# Patient Record
Sex: MALE | Race: WHITE | NOT HISPANIC OR LATINO
[De-identification: names, ages, dates, MRNs, and addresses within clinical notes are randomized per-mention and may not be internally consistent; named-entity substitution may affect disease eponyms.]

---

## 2017-04-19 ENCOUNTER — APPOINTMENT (OUTPATIENT)
Dept: PULMONOLOGY | Facility: CLINIC | Age: 77
End: 2017-04-19

## 2017-04-19 VITALS
HEART RATE: 72 BPM | RESPIRATION RATE: 16 BRPM | BODY MASS INDEX: 22.49 KG/M2 | SYSTOLIC BLOOD PRESSURE: 130 MMHG | WEIGHT: 135 LBS | OXYGEN SATURATION: 99 % | HEIGHT: 65 IN | DIASTOLIC BLOOD PRESSURE: 60 MMHG

## 2017-05-11 ENCOUNTER — APPOINTMENT (OUTPATIENT)
Dept: CARDIOLOGY | Facility: CLINIC | Age: 77
End: 2017-05-11

## 2017-05-11 ENCOUNTER — NON-APPOINTMENT (OUTPATIENT)
Age: 77
End: 2017-05-11

## 2017-05-11 VITALS
DIASTOLIC BLOOD PRESSURE: 76 MMHG | BODY MASS INDEX: 23.32 KG/M2 | OXYGEN SATURATION: 97 % | SYSTOLIC BLOOD PRESSURE: 165 MMHG | WEIGHT: 140 LBS | HEART RATE: 72 BPM | HEIGHT: 65 IN

## 2017-05-23 ENCOUNTER — EMERGENCY (EMERGENCY)
Facility: HOSPITAL | Age: 77
LOS: 1 days | Discharge: HOME | End: 2017-05-23
Attending: EMERGENCY MEDICINE | Admitting: EMERGENCY MEDICINE
Payer: MEDICARE

## 2017-05-23 VITALS
TEMPERATURE: 98 F | SYSTOLIC BLOOD PRESSURE: 164 MMHG | HEART RATE: 80 BPM | OXYGEN SATURATION: 99 % | DIASTOLIC BLOOD PRESSURE: 78 MMHG | RESPIRATION RATE: 16 BRPM

## 2017-05-23 VITALS
DIASTOLIC BLOOD PRESSURE: 82 MMHG | HEART RATE: 81 BPM | OXYGEN SATURATION: 98 % | WEIGHT: 134.92 LBS | RESPIRATION RATE: 16 BRPM | SYSTOLIC BLOOD PRESSURE: 172 MMHG | TEMPERATURE: 98 F

## 2017-05-23 DIAGNOSIS — I25.10 ATHEROSCLEROTIC HEART DISEASE OF NATIVE CORONARY ARTERY WITHOUT ANGINA PECTORIS: ICD-10-CM

## 2017-05-23 DIAGNOSIS — S73.101A UNSPECIFIED SPRAIN OF RIGHT HIP, INITIAL ENCOUNTER: ICD-10-CM

## 2017-05-23 DIAGNOSIS — Z88.0 ALLERGY STATUS TO PENICILLIN: ICD-10-CM

## 2017-05-23 DIAGNOSIS — I10 ESSENTIAL (PRIMARY) HYPERTENSION: ICD-10-CM
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
## 2017-05-23 DIAGNOSIS — S51.811A LACERATION WITHOUT FOREIGN BODY OF RIGHT FOREARM, INITIAL ENCOUNTER: ICD-10-CM
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
## 2017-05-23 DIAGNOSIS — I25.2 OLD MYOCARDIAL INFARCTION: ICD-10-CM

## 2017-05-23 DIAGNOSIS — Z88.1 ALLERGY STATUS TO OTHER ANTIBIOTIC AGENTS STATUS: ICD-10-CM

## 2017-05-23 PROCEDURE — 99283 EMERGENCY DEPT VISIT LOW MDM: CPT

## 2017-05-23 PROCEDURE — 99283 EMERGENCY DEPT VISIT LOW MDM: CPT | Mod: 25

## 2017-05-23 PROCEDURE — 73502 X-RAY EXAM HIP UNI 2-3 VIEWS: CPT

## 2017-05-23 PROCEDURE — 73502 X-RAY EXAM HIP UNI 2-3 VIEWS: CPT | Mod: 26,RT

## 2017-05-23 NOTE — ED PROVIDER NOTE - CARE PLAN
Principal Discharge DX:	Skin tear of forearm without complication, right, initial encounter Principal Discharge DX:	Skin tear of forearm without complication, right, initial encounter  Secondary Diagnosis:	Hip strain, right, initial encounter

## 2017-05-23 NOTE — ED ADULT NURSE NOTE - OBJECTIVE STATEMENT
Pt presents to  subacute area, s/p fall , denies dizziness , weakness, c/o right forearm skin tear and right pelvic discomfort. Pt ambulated without difficulty.

## 2017-05-23 NOTE — ED PROVIDER NOTE - OBJECTIVE STATEMENT
trip and fall today onto deck at home, denies head injury, no loc, no anticoagulants, right forearm skin tear, refusing tetanus, states thinks he had a reaction many years ago.  PMd Dr Hernadez

## 2017-05-23 NOTE — ED PROVIDER NOTE - ATTENDING CONTRIBUTION TO CARE
I have personally performed a face to face diagnostic evaluation on this patient.  I have reviewed the PA note and agree with the history, exam, and plan of care.  76 y/o M presents s/p trip and fall today.  Sustained R forearm skin tear.  Also c/o mild R hip pain.  Did not hit his head.  Denies LOC.  Able to bear weight and ambulate.  No other complaints.

## 2017-05-23 NOTE — ED PROVIDER NOTE - MEDICAL DECISION MAKING DETAILS
right forearm skin tear, wound care, follow up with wound care center at [facility], xray pelvis, right hip strain

## 2017-05-23 NOTE — ED PROVIDER NOTE - PROGRESS NOTE DETAILS
patient refused rx antibiotics , advised follow up with wound care center at [facility]. patient refused rx antibiotics , refused pain medication, advised follow up with wound care center at [facility].

## 2017-05-26 ENCOUNTER — OUTPATIENT (OUTPATIENT)
Dept: OUTPATIENT SERVICES | Facility: HOSPITAL | Age: 77
LOS: 1 days | End: 2017-05-26
Payer: MEDICARE

## 2017-05-26 DIAGNOSIS — L97.801 NON-PRESSURE CHRONIC ULCER OF OTHER PART OF UNSPECIFIED LOWER LEG LIMITED TO BREAKDOWN OF SKIN: ICD-10-CM

## 2017-05-26 PROCEDURE — G0463: CPT
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
## 2017-05-27 DIAGNOSIS — S51.811D LACERATION WITHOUT FOREIGN BODY OF RIGHT FOREARM, SUBSEQUENT ENCOUNTER: ICD-10-CM

## 2017-05-27 DIAGNOSIS — Z85.46 PERSONAL HISTORY OF MALIGNANT NEOPLASM OF PROSTATE: ICD-10-CM

## 2017-05-27 DIAGNOSIS — E78.5 HYPERLIPIDEMIA, UNSPECIFIED: ICD-10-CM

## 2017-05-27 DIAGNOSIS — M06.9 RHEUMATOID ARTHRITIS, UNSPECIFIED: ICD-10-CM
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
## 2017-05-27 DIAGNOSIS — I25.10 ATHEROSCLEROTIC HEART DISEASE OF NATIVE CORONARY ARTERY WITHOUT ANGINA PECTORIS: ICD-10-CM
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
## 2017-05-27 DIAGNOSIS — Z98.890 OTHER SPECIFIED POSTPROCEDURAL STATES: ICD-10-CM

## 2017-05-27 DIAGNOSIS — Z79.899 OTHER LONG TERM (CURRENT) DRUG THERAPY: ICD-10-CM

## 2017-05-27 DIAGNOSIS — Z88.1 ALLERGY STATUS TO OTHER ANTIBIOTIC AGENTS STATUS: ICD-10-CM

## 2017-05-27 DIAGNOSIS — L40.59 OTHER PSORIATIC ARTHROPATHY: ICD-10-CM

## 2017-05-27 DIAGNOSIS — Z88.0 ALLERGY STATUS TO PENICILLIN: ICD-10-CM

## 2017-05-27 DIAGNOSIS — R33.9 RETENTION OF URINE, UNSPECIFIED: ICD-10-CM

## 2017-05-27 DIAGNOSIS — Z96.643 PRESENCE OF ARTIFICIAL HIP JOINT, BILATERAL: ICD-10-CM

## 2017-05-27 DIAGNOSIS — I10 ESSENTIAL (PRIMARY) HYPERTENSION: ICD-10-CM

## 2017-05-27 DIAGNOSIS — Z88.8 ALLERGY STATUS TO OTHER DRUGS, MEDICAMENTS AND BIOLOGICAL SUBSTANCES STATUS: ICD-10-CM
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
## 2017-05-27 DIAGNOSIS — Z82.0 FAMILY HISTORY OF EPILEPSY AND OTHER DISEASES OF THE NERVOUS SYSTEM: ICD-10-CM

## 2017-05-27 DIAGNOSIS — H91.90 UNSPECIFIED HEARING LOSS, UNSPECIFIED EAR: ICD-10-CM

## 2017-05-27 DIAGNOSIS — Z80.1 FAMILY HISTORY OF MALIGNANT NEOPLASM OF TRACHEA, BRONCHUS AND LUNG: ICD-10-CM

## 2017-05-27 DIAGNOSIS — I25.2 OLD MYOCARDIAL INFARCTION: ICD-10-CM

## 2017-06-05 ENCOUNTER — OUTPATIENT (OUTPATIENT)
Dept: OUTPATIENT SERVICES | Facility: HOSPITAL | Age: 77
LOS: 1 days | End: 2017-06-05
Payer: MEDICARE

## 2017-06-05 DIAGNOSIS — L97.801 NON-PRESSURE CHRONIC ULCER OF OTHER PART OF UNSPECIFIED LOWER LEG LIMITED TO BREAKDOWN OF SKIN: ICD-10-CM

## 2017-06-05 PROCEDURE — G0463: CPT

## 2017-06-08 DIAGNOSIS — L40.59 OTHER PSORIATIC ARTHROPATHY: ICD-10-CM

## 2017-06-08 DIAGNOSIS — Z82.0 FAMILY HISTORY OF EPILEPSY AND OTHER DISEASES OF THE NERVOUS SYSTEM: ICD-10-CM
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
## 2017-06-08 DIAGNOSIS — Z88.0 ALLERGY STATUS TO PENICILLIN: ICD-10-CM

## 2017-06-08 DIAGNOSIS — Z88.1 ALLERGY STATUS TO OTHER ANTIBIOTIC AGENTS STATUS: ICD-10-CM

## 2017-06-08 DIAGNOSIS — Z98.890 OTHER SPECIFIED POSTPROCEDURAL STATES: ICD-10-CM

## 2017-06-08 DIAGNOSIS — I25.10 ATHEROSCLEROTIC HEART DISEASE OF NATIVE CORONARY ARTERY WITHOUT ANGINA PECTORIS: ICD-10-CM
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
## 2017-06-08 DIAGNOSIS — E78.5 HYPERLIPIDEMIA, UNSPECIFIED: ICD-10-CM
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
## 2017-06-08 DIAGNOSIS — S51.811D LACERATION WITHOUT FOREIGN BODY OF RIGHT FOREARM, SUBSEQUENT ENCOUNTER: ICD-10-CM

## 2017-06-08 DIAGNOSIS — R33.9 RETENTION OF URINE, UNSPECIFIED: ICD-10-CM

## 2017-06-08 DIAGNOSIS — Z79.899 OTHER LONG TERM (CURRENT) DRUG THERAPY: ICD-10-CM

## 2017-06-08 DIAGNOSIS — M06.9 RHEUMATOID ARTHRITIS, UNSPECIFIED: ICD-10-CM

## 2017-06-08 DIAGNOSIS — Z80.1 FAMILY HISTORY OF MALIGNANT NEOPLASM OF TRACHEA, BRONCHUS AND LUNG: ICD-10-CM

## 2017-06-08 DIAGNOSIS — Z96.643 PRESENCE OF ARTIFICIAL HIP JOINT, BILATERAL: ICD-10-CM

## 2017-06-08 DIAGNOSIS — Z85.46 PERSONAL HISTORY OF MALIGNANT NEOPLASM OF PROSTATE: ICD-10-CM

## 2017-06-08 DIAGNOSIS — I10 ESSENTIAL (PRIMARY) HYPERTENSION: ICD-10-CM
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
## 2017-06-08 DIAGNOSIS — I25.2 OLD MYOCARDIAL INFARCTION: ICD-10-CM

## 2017-06-08 DIAGNOSIS — Z88.8 ALLERGY STATUS TO OTHER DRUGS, MEDICAMENTS AND BIOLOGICAL SUBSTANCES STATUS: ICD-10-CM

## 2017-06-08 DIAGNOSIS — H91.90 UNSPECIFIED HEARING LOSS, UNSPECIFIED EAR: ICD-10-CM

## 2017-06-12 ENCOUNTER — OUTPATIENT (OUTPATIENT)
Dept: OUTPATIENT SERVICES | Facility: HOSPITAL | Age: 77
LOS: 1 days | End: 2017-06-12
Payer: MEDICARE

## 2017-06-12 DIAGNOSIS — L97.801 NON-PRESSURE CHRONIC ULCER OF OTHER PART OF UNSPECIFIED LOWER LEG LIMITED TO BREAKDOWN OF SKIN: ICD-10-CM

## 2017-06-12 PROCEDURE — G0463: CPT

## 2017-06-12 PROCEDURE — 99213 OFFICE O/P EST LOW 20 MIN: CPT

## 2017-06-14 DIAGNOSIS — I10 ESSENTIAL (PRIMARY) HYPERTENSION: ICD-10-CM

## 2017-06-14 DIAGNOSIS — S51.811D LACERATION WITHOUT FOREIGN BODY OF RIGHT FOREARM, SUBSEQUENT ENCOUNTER: ICD-10-CM

## 2017-06-14 DIAGNOSIS — Z82.0 FAMILY HISTORY OF EPILEPSY AND OTHER DISEASES OF THE NERVOUS SYSTEM: ICD-10-CM

## 2017-06-14 DIAGNOSIS — Z88.0 ALLERGY STATUS TO PENICILLIN: ICD-10-CM

## 2017-06-14 DIAGNOSIS — I25.10 ATHEROSCLEROTIC HEART DISEASE OF NATIVE CORONARY ARTERY WITHOUT ANGINA PECTORIS: ICD-10-CM
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
## 2017-06-14 DIAGNOSIS — Z88.8 ALLERGY STATUS TO OTHER DRUGS, MEDICAMENTS AND BIOLOGICAL SUBSTANCES STATUS: ICD-10-CM

## 2017-06-14 DIAGNOSIS — Z80.1 FAMILY HISTORY OF MALIGNANT NEOPLASM OF TRACHEA, BRONCHUS AND LUNG: ICD-10-CM

## 2017-06-14 DIAGNOSIS — I25.2 OLD MYOCARDIAL INFARCTION: ICD-10-CM

## 2017-06-14 DIAGNOSIS — E78.5 HYPERLIPIDEMIA, UNSPECIFIED: ICD-10-CM

## 2017-06-14 DIAGNOSIS — Z88.1 ALLERGY STATUS TO OTHER ANTIBIOTIC AGENTS STATUS: ICD-10-CM

## 2017-06-14 DIAGNOSIS — M06.9 RHEUMATOID ARTHRITIS, UNSPECIFIED: ICD-10-CM
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
## 2017-06-14 DIAGNOSIS — Z96.643 PRESENCE OF ARTIFICIAL HIP JOINT, BILATERAL: ICD-10-CM

## 2017-06-14 DIAGNOSIS — Z85.46 PERSONAL HISTORY OF MALIGNANT NEOPLASM OF PROSTATE: ICD-10-CM
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
## 2017-06-14 DIAGNOSIS — Z79.899 OTHER LONG TERM (CURRENT) DRUG THERAPY: ICD-10-CM

## 2017-06-14 DIAGNOSIS — H91.90 UNSPECIFIED HEARING LOSS, UNSPECIFIED EAR: ICD-10-CM

## 2017-06-14 DIAGNOSIS — R33.9 RETENTION OF URINE, UNSPECIFIED: ICD-10-CM

## 2017-06-14 DIAGNOSIS — Z98.890 OTHER SPECIFIED POSTPROCEDURAL STATES: ICD-10-CM

## 2017-06-14 DIAGNOSIS — L40.59 OTHER PSORIATIC ARTHROPATHY: ICD-10-CM

## 2017-06-19 ENCOUNTER — OUTPATIENT (OUTPATIENT)
Dept: OUTPATIENT SERVICES | Facility: HOSPITAL | Age: 77
LOS: 1 days | End: 2017-06-19
Payer: MEDICARE

## 2017-06-19 DIAGNOSIS — S51.811D LACERATION WITHOUT FOREIGN BODY OF RIGHT FOREARM, SUBSEQUENT ENCOUNTER: ICD-10-CM

## 2017-06-19 PROCEDURE — G0463: CPT

## 2017-06-21 DIAGNOSIS — Z98.890 OTHER SPECIFIED POSTPROCEDURAL STATES: ICD-10-CM
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
## 2017-06-21 DIAGNOSIS — R33.9 RETENTION OF URINE, UNSPECIFIED: ICD-10-CM

## 2017-06-21 DIAGNOSIS — Z88.1 ALLERGY STATUS TO OTHER ANTIBIOTIC AGENTS STATUS: ICD-10-CM
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
## 2017-06-21 DIAGNOSIS — S50.811D ABRASION OF RIGHT FOREARM, SUBSEQUENT ENCOUNTER: ICD-10-CM

## 2017-06-21 DIAGNOSIS — Z79.899 OTHER LONG TERM (CURRENT) DRUG THERAPY: ICD-10-CM

## 2017-06-21 DIAGNOSIS — Z85.46 PERSONAL HISTORY OF MALIGNANT NEOPLASM OF PROSTATE: ICD-10-CM

## 2017-06-21 DIAGNOSIS — H91.90 UNSPECIFIED HEARING LOSS, UNSPECIFIED EAR: ICD-10-CM

## 2017-06-21 DIAGNOSIS — I25.2 OLD MYOCARDIAL INFARCTION: ICD-10-CM

## 2017-06-21 DIAGNOSIS — Z80.1 FAMILY HISTORY OF MALIGNANT NEOPLASM OF TRACHEA, BRONCHUS AND LUNG: ICD-10-CM

## 2017-06-21 DIAGNOSIS — Z96.643 PRESENCE OF ARTIFICIAL HIP JOINT, BILATERAL: ICD-10-CM

## 2017-06-21 DIAGNOSIS — I10 ESSENTIAL (PRIMARY) HYPERTENSION: ICD-10-CM

## 2017-06-21 DIAGNOSIS — Z82.0 FAMILY HISTORY OF EPILEPSY AND OTHER DISEASES OF THE NERVOUS SYSTEM: ICD-10-CM
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
## 2017-06-21 DIAGNOSIS — M06.9 RHEUMATOID ARTHRITIS, UNSPECIFIED: ICD-10-CM

## 2017-06-21 DIAGNOSIS — E78.5 HYPERLIPIDEMIA, UNSPECIFIED: ICD-10-CM

## 2017-06-21 DIAGNOSIS — I25.10 ATHEROSCLEROTIC HEART DISEASE OF NATIVE CORONARY ARTERY WITHOUT ANGINA PECTORIS: ICD-10-CM
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
## 2017-06-21 DIAGNOSIS — Z88.8 ALLERGY STATUS TO OTHER DRUGS, MEDICAMENTS AND BIOLOGICAL SUBSTANCES STATUS: ICD-10-CM

## 2017-06-21 DIAGNOSIS — Z88.0 ALLERGY STATUS TO PENICILLIN: ICD-10-CM

## 2017-06-21 DIAGNOSIS — L40.59 OTHER PSORIATIC ARTHROPATHY: ICD-10-CM

## 2017-06-26 ENCOUNTER — OUTPATIENT (OUTPATIENT)
Dept: OUTPATIENT SERVICES | Facility: HOSPITAL | Age: 77
LOS: 1 days | End: 2017-06-26
Payer: MEDICARE

## 2017-06-26 DIAGNOSIS — L97.801 NON-PRESSURE CHRONIC ULCER OF OTHER PART OF UNSPECIFIED LOWER LEG LIMITED TO BREAKDOWN OF SKIN: ICD-10-CM

## 2017-06-26 PROCEDURE — 99213 OFFICE O/P EST LOW 20 MIN: CPT

## 2017-06-26 PROCEDURE — G0463: CPT

## 2017-06-29 DIAGNOSIS — R33.9 RETENTION OF URINE, UNSPECIFIED: ICD-10-CM

## 2017-06-29 DIAGNOSIS — I10 ESSENTIAL (PRIMARY) HYPERTENSION: ICD-10-CM

## 2017-06-29 DIAGNOSIS — M06.9 RHEUMATOID ARTHRITIS, UNSPECIFIED: ICD-10-CM
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
## 2017-06-29 DIAGNOSIS — S50.811D ABRASION OF RIGHT FOREARM, SUBSEQUENT ENCOUNTER: ICD-10-CM

## 2017-06-29 DIAGNOSIS — Z79.899 OTHER LONG TERM (CURRENT) DRUG THERAPY: ICD-10-CM

## 2017-06-29 DIAGNOSIS — Z88.1 ALLERGY STATUS TO OTHER ANTIBIOTIC AGENTS STATUS: ICD-10-CM

## 2017-06-29 DIAGNOSIS — Z82.0 FAMILY HISTORY OF EPILEPSY AND OTHER DISEASES OF THE NERVOUS SYSTEM: ICD-10-CM
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
## 2017-06-29 DIAGNOSIS — Z96.643 PRESENCE OF ARTIFICIAL HIP JOINT, BILATERAL: ICD-10-CM

## 2017-06-29 DIAGNOSIS — I25.2 OLD MYOCARDIAL INFARCTION: ICD-10-CM
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
## 2017-06-29 DIAGNOSIS — H91.90 UNSPECIFIED HEARING LOSS, UNSPECIFIED EAR: ICD-10-CM

## 2017-06-29 DIAGNOSIS — Z85.46 PERSONAL HISTORY OF MALIGNANT NEOPLASM OF PROSTATE: ICD-10-CM

## 2017-06-29 DIAGNOSIS — I25.10 ATHEROSCLEROTIC HEART DISEASE OF NATIVE CORONARY ARTERY WITHOUT ANGINA PECTORIS: ICD-10-CM

## 2017-06-29 DIAGNOSIS — E78.5 HYPERLIPIDEMIA, UNSPECIFIED: ICD-10-CM

## 2017-06-29 DIAGNOSIS — Z80.1 FAMILY HISTORY OF MALIGNANT NEOPLASM OF TRACHEA, BRONCHUS AND LUNG: ICD-10-CM

## 2017-06-29 DIAGNOSIS — Z98.890 OTHER SPECIFIED POSTPROCEDURAL STATES: ICD-10-CM

## 2017-06-29 DIAGNOSIS — L40.59 OTHER PSORIATIC ARTHROPATHY: ICD-10-CM

## 2017-06-29 DIAGNOSIS — Z88.0 ALLERGY STATUS TO PENICILLIN: ICD-10-CM
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
## 2017-06-29 DIAGNOSIS — Z88.8 ALLERGY STATUS TO OTHER DRUGS, MEDICAMENTS AND BIOLOGICAL SUBSTANCES STATUS: ICD-10-CM

## 2017-07-03 ENCOUNTER — OUTPATIENT (OUTPATIENT)
Dept: OUTPATIENT SERVICES | Facility: HOSPITAL | Age: 77
LOS: 1 days | End: 2017-07-03
Payer: MEDICARE

## 2017-07-03 DIAGNOSIS — L97.801 NON-PRESSURE CHRONIC ULCER OF OTHER PART OF UNSPECIFIED LOWER LEG LIMITED TO BREAKDOWN OF SKIN: ICD-10-CM

## 2017-07-03 PROCEDURE — G0463: CPT

## 2017-07-06 DIAGNOSIS — E78.5 HYPERLIPIDEMIA, UNSPECIFIED: ICD-10-CM

## 2017-07-06 DIAGNOSIS — Z98.890 OTHER SPECIFIED POSTPROCEDURAL STATES: ICD-10-CM

## 2017-07-06 DIAGNOSIS — M06.9 RHEUMATOID ARTHRITIS, UNSPECIFIED: ICD-10-CM

## 2017-07-06 DIAGNOSIS — Z88.1 ALLERGY STATUS TO OTHER ANTIBIOTIC AGENTS STATUS: ICD-10-CM

## 2017-07-06 DIAGNOSIS — Z82.0 FAMILY HISTORY OF EPILEPSY AND OTHER DISEASES OF THE NERVOUS SYSTEM: ICD-10-CM

## 2017-07-06 DIAGNOSIS — S50.811D ABRASION OF RIGHT FOREARM, SUBSEQUENT ENCOUNTER: ICD-10-CM

## 2017-07-06 DIAGNOSIS — I25.10 ATHEROSCLEROTIC HEART DISEASE OF NATIVE CORONARY ARTERY WITHOUT ANGINA PECTORIS: ICD-10-CM
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
## 2017-07-06 DIAGNOSIS — Z96.643 PRESENCE OF ARTIFICIAL HIP JOINT, BILATERAL: ICD-10-CM

## 2017-07-06 DIAGNOSIS — I10 ESSENTIAL (PRIMARY) HYPERTENSION: ICD-10-CM
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
## 2017-07-06 DIAGNOSIS — I25.2 OLD MYOCARDIAL INFARCTION: ICD-10-CM

## 2017-07-06 DIAGNOSIS — Z79.899 OTHER LONG TERM (CURRENT) DRUG THERAPY: ICD-10-CM

## 2017-07-06 DIAGNOSIS — Z80.1 FAMILY HISTORY OF MALIGNANT NEOPLASM OF TRACHEA, BRONCHUS AND LUNG: ICD-10-CM

## 2017-07-06 DIAGNOSIS — Z88.0 ALLERGY STATUS TO PENICILLIN: ICD-10-CM

## 2017-07-06 DIAGNOSIS — Z88.8 ALLERGY STATUS TO OTHER DRUGS, MEDICAMENTS AND BIOLOGICAL SUBSTANCES STATUS: ICD-10-CM

## 2017-07-06 DIAGNOSIS — R33.9 RETENTION OF URINE, UNSPECIFIED: ICD-10-CM
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
## 2017-07-06 DIAGNOSIS — H91.90 UNSPECIFIED HEARING LOSS, UNSPECIFIED EAR: ICD-10-CM
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
## 2017-07-06 DIAGNOSIS — Z85.46 PERSONAL HISTORY OF MALIGNANT NEOPLASM OF PROSTATE: ICD-10-CM

## 2017-07-06 DIAGNOSIS — L40.59 OTHER PSORIATIC ARTHROPATHY: ICD-10-CM

## 2017-07-10 ENCOUNTER — OUTPATIENT (OUTPATIENT)
Dept: OUTPATIENT SERVICES | Facility: HOSPITAL | Age: 77
LOS: 1 days | End: 2017-07-10
Payer: MEDICARE

## 2017-07-10 DIAGNOSIS — S51.811D LACERATION WITHOUT FOREIGN BODY OF RIGHT FOREARM, SUBSEQUENT ENCOUNTER: ICD-10-CM

## 2017-07-10 PROCEDURE — G0463: CPT

## 2017-07-12 DIAGNOSIS — E78.5 HYPERLIPIDEMIA, UNSPECIFIED: ICD-10-CM

## 2017-07-12 DIAGNOSIS — H91.90 UNSPECIFIED HEARING LOSS, UNSPECIFIED EAR: ICD-10-CM

## 2017-07-12 DIAGNOSIS — S50.811D ABRASION OF RIGHT FOREARM, SUBSEQUENT ENCOUNTER: ICD-10-CM

## 2017-07-12 DIAGNOSIS — Z88.1 ALLERGY STATUS TO OTHER ANTIBIOTIC AGENTS STATUS: ICD-10-CM

## 2017-07-12 DIAGNOSIS — Z85.49 PERSONAL HISTORY OF MALIGNANT NEOPLASM OF OTHER MALE GENITAL ORGANS: ICD-10-CM

## 2017-07-12 DIAGNOSIS — Z88.0 ALLERGY STATUS TO PENICILLIN: ICD-10-CM

## 2017-07-12 DIAGNOSIS — L40.59 OTHER PSORIATIC ARTHROPATHY: ICD-10-CM

## 2017-07-12 DIAGNOSIS — Z88.8 ALLERGY STATUS TO OTHER DRUGS, MEDICAMENTS AND BIOLOGICAL SUBSTANCES STATUS: ICD-10-CM

## 2017-07-12 DIAGNOSIS — Z96.643 PRESENCE OF ARTIFICIAL HIP JOINT, BILATERAL: ICD-10-CM

## 2017-07-12 DIAGNOSIS — I25.10 ATHEROSCLEROTIC HEART DISEASE OF NATIVE CORONARY ARTERY WITHOUT ANGINA PECTORIS: ICD-10-CM

## 2017-07-12 DIAGNOSIS — I25.2 OLD MYOCARDIAL INFARCTION: ICD-10-CM
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
## 2017-07-12 DIAGNOSIS — Z79.899 OTHER LONG TERM (CURRENT) DRUG THERAPY: ICD-10-CM

## 2017-07-12 DIAGNOSIS — Z80.1 FAMILY HISTORY OF MALIGNANT NEOPLASM OF TRACHEA, BRONCHUS AND LUNG: ICD-10-CM

## 2017-07-12 DIAGNOSIS — Z98.890 OTHER SPECIFIED POSTPROCEDURAL STATES: ICD-10-CM
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
## 2017-07-12 DIAGNOSIS — R33.9 RETENTION OF URINE, UNSPECIFIED: ICD-10-CM

## 2017-07-12 DIAGNOSIS — I10 ESSENTIAL (PRIMARY) HYPERTENSION: ICD-10-CM

## 2017-07-12 DIAGNOSIS — Z82.0 FAMILY HISTORY OF EPILEPSY AND OTHER DISEASES OF THE NERVOUS SYSTEM: ICD-10-CM
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
## 2017-07-12 DIAGNOSIS — M06.9 RHEUMATOID ARTHRITIS, UNSPECIFIED: ICD-10-CM

## 2017-07-18 ENCOUNTER — RX RENEWAL (OUTPATIENT)
Age: 77
End: 2017-07-18

## 2017-10-05 ENCOUNTER — TRANSCRIPTION ENCOUNTER (OUTPATIENT)
Age: 77
End: 2017-10-05

## 2017-10-25 ENCOUNTER — RX RENEWAL (OUTPATIENT)
Age: 77
End: 2017-10-25

## 2017-11-15 ENCOUNTER — APPOINTMENT (OUTPATIENT)
Dept: PULMONOLOGY | Facility: CLINIC | Age: 77
End: 2017-11-15
Payer: MEDICARE

## 2017-11-15 VITALS
HEART RATE: 72 BPM | OXYGEN SATURATION: 98 % | DIASTOLIC BLOOD PRESSURE: 80 MMHG | BODY MASS INDEX: 21.66 KG/M2 | HEIGHT: 65 IN | SYSTOLIC BLOOD PRESSURE: 126 MMHG | WEIGHT: 130 LBS

## 2017-11-15 PROCEDURE — 99214 OFFICE O/P EST MOD 30 MIN: CPT | Mod: 25

## 2017-11-15 PROCEDURE — 94010 BREATHING CAPACITY TEST: CPT

## 2017-11-17 ENCOUNTER — APPOINTMENT (OUTPATIENT)
Dept: CARDIOLOGY | Facility: CLINIC | Age: 77
End: 2017-11-17
Payer: MEDICARE

## 2017-11-17 ENCOUNTER — NON-APPOINTMENT (OUTPATIENT)
Age: 77
End: 2017-11-17

## 2017-11-17 VITALS
DIASTOLIC BLOOD PRESSURE: 77 MMHG | BODY MASS INDEX: 21.66 KG/M2 | SYSTOLIC BLOOD PRESSURE: 159 MMHG | HEART RATE: 70 BPM | WEIGHT: 130 LBS | OXYGEN SATURATION: 97 % | HEIGHT: 65 IN

## 2017-11-17 PROCEDURE — 93000 ELECTROCARDIOGRAM COMPLETE: CPT

## 2017-11-17 PROCEDURE — 99214 OFFICE O/P EST MOD 30 MIN: CPT

## 2018-02-14 ENCOUNTER — RX RENEWAL (OUTPATIENT)
Age: 78
End: 2018-02-14

## 2018-04-03 ENCOUNTER — MEDICATION RENEWAL (OUTPATIENT)
Age: 78
End: 2018-04-03

## 2018-05-15 ENCOUNTER — NON-APPOINTMENT (OUTPATIENT)
Age: 78
End: 2018-05-15

## 2018-05-15 ENCOUNTER — APPOINTMENT (OUTPATIENT)
Dept: PULMONOLOGY | Facility: CLINIC | Age: 78
End: 2018-05-15
Payer: MEDICARE

## 2018-05-15 VITALS
HEART RATE: 76 BPM | OXYGEN SATURATION: 98 % | SYSTOLIC BLOOD PRESSURE: 132 MMHG | WEIGHT: 133 LBS | DIASTOLIC BLOOD PRESSURE: 70 MMHG | HEIGHT: 64 IN | BODY MASS INDEX: 22.71 KG/M2 | RESPIRATION RATE: 17 BRPM

## 2018-05-15 PROCEDURE — 94010 BREATHING CAPACITY TEST: CPT

## 2018-05-15 PROCEDURE — 99214 OFFICE O/P EST MOD 30 MIN: CPT | Mod: 25

## 2018-05-30 ENCOUNTER — APPOINTMENT (OUTPATIENT)
Dept: CARDIOLOGY | Facility: CLINIC | Age: 78
End: 2018-05-30
Payer: MEDICARE

## 2018-05-30 ENCOUNTER — NON-APPOINTMENT (OUTPATIENT)
Age: 78
End: 2018-05-30

## 2018-05-30 VITALS
BODY MASS INDEX: 22.53 KG/M2 | SYSTOLIC BLOOD PRESSURE: 159 MMHG | DIASTOLIC BLOOD PRESSURE: 73 MMHG | HEART RATE: 69 BPM | WEIGHT: 132 LBS | HEIGHT: 64 IN | OXYGEN SATURATION: 97 %

## 2018-05-30 PROCEDURE — 99214 OFFICE O/P EST MOD 30 MIN: CPT

## 2018-05-30 PROCEDURE — 93000 ELECTROCARDIOGRAM COMPLETE: CPT

## 2018-05-30 RX ORDER — CLOTRIMAZOLE AND BETAMETHASONE DIPROPIONATE 10; .5 MG/ML; MG/ML
1-0.05 LOTION TOPICAL
Qty: 60 | Refills: 0 | Status: DISCONTINUED | COMMUNITY
Start: 2017-10-31 | End: 2018-05-30

## 2018-08-07 ENCOUNTER — RX RENEWAL (OUTPATIENT)
Age: 78
End: 2018-08-07

## 2018-10-16 ENCOUNTER — MEDICATION RENEWAL (OUTPATIENT)
Age: 78
End: 2018-10-16

## 2018-10-17 ENCOUNTER — TRANSCRIPTION ENCOUNTER (OUTPATIENT)
Age: 78
End: 2018-10-17

## 2018-11-09 PROBLEM — I21.3 ST ELEVATION (STEMI) MYOCARDIAL INFARCTION OF UNSPECIFIED SITE: Chronic | Status: ACTIVE | Noted: 2017-05-23

## 2018-11-09 PROBLEM — E78.5 HYPERLIPIDEMIA, UNSPECIFIED: Chronic | Status: ACTIVE | Noted: 2017-05-23

## 2018-11-09 PROBLEM — I25.10 ATHEROSCLEROTIC HEART DISEASE OF NATIVE CORONARY ARTERY WITHOUT ANGINA PECTORIS: Chronic | Status: ACTIVE | Noted: 2017-05-23

## 2018-11-09 PROBLEM — I10 ESSENTIAL (PRIMARY) HYPERTENSION: Chronic | Status: ACTIVE | Noted: 2017-05-23

## 2018-11-12 ENCOUNTER — OTHER (OUTPATIENT)
Age: 78
End: 2018-11-12

## 2018-11-15 ENCOUNTER — NON-APPOINTMENT (OUTPATIENT)
Age: 78
End: 2018-11-15

## 2018-11-15 ENCOUNTER — APPOINTMENT (OUTPATIENT)
Dept: PULMONOLOGY | Facility: CLINIC | Age: 78
End: 2018-11-15
Payer: MEDICARE

## 2018-11-15 VITALS
WEIGHT: 124 LBS | RESPIRATION RATE: 17 BRPM | HEIGHT: 64 IN | OXYGEN SATURATION: 98 % | BODY MASS INDEX: 21.17 KG/M2 | SYSTOLIC BLOOD PRESSURE: 110 MMHG | DIASTOLIC BLOOD PRESSURE: 70 MMHG | HEART RATE: 76 BPM

## 2018-11-15 DIAGNOSIS — R26.89 OTHER ABNORMALITIES OF GAIT AND MOBILITY: ICD-10-CM

## 2018-11-15 PROCEDURE — 99214 OFFICE O/P EST MOD 30 MIN: CPT | Mod: 25

## 2018-11-15 PROCEDURE — 94010 BREATHING CAPACITY TEST: CPT

## 2018-11-15 RX ORDER — MONTELUKAST 10 MG/1
10 TABLET, FILM COATED ORAL
Qty: 90 | Refills: 0 | Status: DISCONTINUED | COMMUNITY
Start: 2017-11-15 | End: 2018-11-15

## 2018-11-15 RX ORDER — MONTELUKAST SODIUM 10 MG/1
10 TABLET, FILM COATED ORAL
Qty: 1 | Refills: 1 | Status: DISCONTINUED | COMMUNITY
Start: 2017-07-18 | End: 2018-11-15

## 2018-11-15 RX ORDER — LEVOCETIRIZINE DIHYDROCHLORIDE 5 MG/1
5 TABLET ORAL
Qty: 1 | Refills: 1 | Status: DISCONTINUED | COMMUNITY
Start: 2017-11-15 | End: 2018-11-15

## 2018-11-15 RX ORDER — MONTELUKAST 10 MG/1
10 TABLET, FILM COATED ORAL
Qty: 90 | Refills: 0 | Status: DISCONTINUED | COMMUNITY
Start: 2017-04-19 | End: 2018-11-15

## 2018-11-15 RX ORDER — LEVOCETIRIZINE DIHYDROCHLORIDE 5 MG/1
5 TABLET ORAL
Qty: 90 | Refills: 0 | Status: DISCONTINUED | COMMUNITY
Start: 2017-04-19 | End: 2018-11-15

## 2018-11-15 NOTE — REASON FOR VISIT
[Follow-Up] : a follow-up visit [FreeTextEntry1] : heart disease, asthma, GERD, hyperlipidemia, RAD, SOB,

## 2018-11-15 NOTE — PHYSICAL EXAM
[General Appearance - Well Developed] : well developed [Normal Appearance] : normal appearance [Well Groomed] : well groomed [General Appearance - Well Nourished] : well nourished [No Deformities] : no deformities [General Appearance - In No Acute Distress] : no acute distress [Normal Conjunctiva] : the conjunctiva exhibited no abnormalities [Eyelids - No Xanthelasma] : the eyelids demonstrated no xanthelasmas [Normal Oropharynx] : normal oropharynx [II] : II [Neck Appearance] : the appearance of the neck was normal [Neck Cervical Mass (___cm)] : no neck mass was observed [Jugular Venous Distention Increased] : there was no jugular-venous distention [Thyroid Diffuse Enlargement] : the thyroid was not enlarged [Thyroid Nodule] : there were no palpable thyroid nodules [Heart Rate And Rhythm] : heart rate and rhythm were normal [Heart Sounds] : normal S1 and S2 [Murmurs] : no murmurs present [Respiration, Rhythm And Depth] : normal respiratory rhythm and effort [Exaggerated Use Of Accessory Muscles For Inspiration] : no accessory muscle use [Auscultation Breath Sounds / Voice Sounds] : lungs were clear to auscultation bilaterally [Abdomen Soft] : soft [Abdomen Tenderness] : non-tender [Abdomen Mass (___ Cm)] : no abdominal mass palpated [Abnormal Walk] : normal gait [Gait - Sufficient For Exercise Testing] : the gait was sufficient for exercise testing [Nail Clubbing] : no clubbing of the fingernails [Cyanosis, Localized] : no localized cyanosis [Petechial Hemorrhages (___cm)] : no petechial hemorrhages [Skin Color & Pigmentation] : normal skin color and pigmentation [] : no rash [No Venous Stasis] : no venous stasis [Skin Lesions] : no skin lesions [No Skin Ulcers] : no skin ulcer [No Xanthoma] : no  xanthoma was observed [Deep Tendon Reflexes (DTR)] : deep tendon reflexes were 2+ and symmetric [Sensation] : the sensory exam was normal to light touch and pinprick [No Focal Deficits] : no focal deficits [Oriented To Time, Place, And Person] : oriented to person, place, and time [Impaired Insight] : insight and judgment were intact [Affect] : the affect was normal

## 2018-11-15 NOTE — PROCEDURE
[FreeTextEntry1] : PFT - spi reveals normal flows; FEV1 is 1.90 which is 84% of predicted, normal flow volume loop

## 2018-11-15 NOTE — ASSESSMENT
[FreeTextEntry1] : Mr. Wagner is doing well from a pulmonary perspective and is stable. He has a h/o of RADS, allergy, GERD, RA, CAD, BPH.\par \par His shortness of breath is multifactorial due to:\par -asthma\par -cardiac disease\par -poor breathing mechanics \par \par problem 1: RAD/ mild intermittent asthma\par -continue to use Singulair 10 mg before bed\par \par -Asthma is  believed to be caused by inherited (genetic) and environmental factor, but its exact cause is unknown. Asthma may be triggered by allergens, lung infections, or irritants in the air. Asthma triggers are different for each person\par -Inhaler technique reviewed as well as oral hygiene techniques reviewed with patient. Avoidance of cold air, extremes of temperature, rescue inhaler should be used before exercise. Order of medication reviewed with patient. Recommended use of a cool mist humidifier in the bedroom.\par \par problem 2: allergic rhinitis\par -continue to use Xyzal 5 mg before bed\par -continue to use Flonase 1 sniff/nostril BID\par \par -Environmental measures for allergies were encouraged including mattress and pillow cover, air purifier, and environmental controls.\par \par problem 3: GERD\par -continue to use Omeprazole 40 mg before breakfast\par \par -Rule of 2s: avoid eating too much, eating too late, eating too spicy, eating two hours before bed\par -Things to avoid including overeating, spicy foods, tight clothing, eating within three hours of bed, this list is not all inclusive. \par -For treatment of reflux, possible options discussed including diet control, H2 blockers, PPIs, as well as coating motility agents discussed as treatment options. Timing of meals and proximity of last meal to sleep were discussed. If symptoms persist, a formal gastrointestinal evaluation is needed.\par \par problem 4: overweight- improved. \par -Weight loss, exercise, and diet control were discussed and are highly encouraged. Treatment options were given such as, aqua therapy, and contacting a nutritionist. Recommended to use the elliptical, stationary bike, less use of treadmill.  Obesity is associated with worsening asthma, shortness of breath, and potential for cardiac disease, diabetes, and other underlying medical conditions.\par \par problem 5: health maintenance \par -s/p influenza vaccine -2018\par -recommended strep pneumonia vaccines: Prevnar-13 vaccine(January 2017), followed by Pneumo vaccine 23 one year following (repeat in January 2018)\par -recommended early intervention for URIs\par -recommended regular osteoporosis evaluations\par -recommended early dermatological evaluations\par -recommended after the age of 50 to the age of 70, colonoscopy every 5 years \par \par problem 6; nocturnal muscle cramps \par -recommended MyoCalm \par -recommended to use [place] cream\par \par F/U in 6 months\par He is encouraged to call with any changes, concerns, or questions.

## 2018-11-15 NOTE — HISTORY OF PRESENT ILLNESS
[FreeTextEntry1] : Mr. Wagner is a 78 year old male with a history of heart disease, asthma, GERD, hyperlipidemia, RAD, SOB, who comes in today for a follow up  visit. His chief complaint is his leg cramps.\par -he states that he has been feeling well with average energy levels\par -he notes that his sleep is good but he wakes up once a night to urinate. He states he gets about 6 hours of sleep before needing to urinate.\par -he reports he has leg cramps\par -he states that he has infrequent diarrhea\par -he denies any palpitations\par -he reports that his bowels are usually regular\par -he states that he has an appetite but he is unsure if he is eating enough\par -he notes he gets itchy ears which he attributes to his hearing aids\par -he states he uses eye drops for his dry eyes\par -he denies any recent significant sinus issues and is compliant with his Flonase\par -he denies any headaches, nausea, vomiting, fever, chills, sweats, chest pain, chest pressure, constipation, dysphagia, dizziness, leg swelling, leg pain, itchy eyes, heartburn, reflux, or sour taste in the mouth.

## 2018-11-15 NOTE — ADDENDUM
[FreeTextEntry1] : All medical record entries made by robert Kirkpatrick were at Dr. Asim Narayan's, direction and personally dictated by me on (11/15/2018). I have reviewed the chart and agree that the record accurately reflects my personal performance of the history, physical exam, assessment and plan. I have also personally directed, reviewed, and agree with the discharge instructions.

## 2018-11-30 ENCOUNTER — RX RENEWAL (OUTPATIENT)
Age: 78
End: 2018-11-30

## 2018-12-04 ENCOUNTER — APPOINTMENT (OUTPATIENT)
Dept: CARDIOLOGY | Facility: CLINIC | Age: 78
End: 2018-12-04
Payer: MEDICARE

## 2018-12-04 PROCEDURE — 93306 TTE W/DOPPLER COMPLETE: CPT

## 2018-12-21 ENCOUNTER — APPOINTMENT (OUTPATIENT)
Dept: CARDIOLOGY | Facility: CLINIC | Age: 78
End: 2018-12-21
Payer: MEDICARE

## 2018-12-21 ENCOUNTER — NON-APPOINTMENT (OUTPATIENT)
Age: 78
End: 2018-12-21

## 2018-12-21 VITALS
HEART RATE: 65 BPM | BODY MASS INDEX: 21 KG/M2 | HEIGHT: 64 IN | WEIGHT: 123 LBS | OXYGEN SATURATION: 99 % | SYSTOLIC BLOOD PRESSURE: 150 MMHG | DIASTOLIC BLOOD PRESSURE: 79 MMHG

## 2018-12-21 PROCEDURE — 99214 OFFICE O/P EST MOD 30 MIN: CPT

## 2018-12-21 PROCEDURE — 93000 ELECTROCARDIOGRAM COMPLETE: CPT

## 2018-12-21 RX ORDER — LEVOCETIRIZINE DIHYDROCHLORIDE 5 MG/1
5 TABLET ORAL
Qty: 1 | Refills: 3 | Status: DISCONTINUED | COMMUNITY
Start: 2018-11-15 | End: 2018-12-21

## 2018-12-21 RX ORDER — LEVOCETIRIZINE DIHYDROCHLORIDE 5 MG/1
5 TABLET ORAL
Qty: 1 | Refills: 1 | Status: DISCONTINUED | COMMUNITY
Start: 2018-05-15 | End: 2018-12-21

## 2018-12-21 RX ORDER — ZOSTER VACCINE RECOMBINANT, ADJUVANTED 50 MCG/0.5
50 KIT INTRAMUSCULAR
Qty: 2 | Refills: 0 | Status: DISCONTINUED | COMMUNITY
Start: 2018-11-30 | End: 2018-12-21

## 2018-12-21 RX ORDER — MONTELUKAST 10 MG/1
10 TABLET, FILM COATED ORAL
Qty: 90 | Refills: 1 | Status: DISCONTINUED | COMMUNITY
Start: 2018-02-14 | End: 2018-12-21

## 2019-01-01 ENCOUNTER — OTHER (OUTPATIENT)
Age: 79
End: 2019-01-01

## 2019-01-01 ENCOUNTER — MEDICATION RENEWAL (OUTPATIENT)
Age: 79
End: 2019-01-01

## 2019-01-01 ENCOUNTER — APPOINTMENT (OUTPATIENT)
Dept: CARDIOLOGY | Facility: CLINIC | Age: 79
End: 2019-01-01
Payer: MEDICARE

## 2019-01-01 ENCOUNTER — NON-APPOINTMENT (OUTPATIENT)
Age: 79
End: 2019-01-01

## 2019-01-01 ENCOUNTER — APPOINTMENT (OUTPATIENT)
Dept: PULMONOLOGY | Facility: CLINIC | Age: 79
End: 2019-01-01
Payer: MEDICARE

## 2019-01-01 VITALS
RESPIRATION RATE: 16 BRPM | BODY MASS INDEX: 21.53 KG/M2 | OXYGEN SATURATION: 96 % | WEIGHT: 117 LBS | HEIGHT: 62 IN | SYSTOLIC BLOOD PRESSURE: 124 MMHG | DIASTOLIC BLOOD PRESSURE: 65 MMHG | HEART RATE: 78 BPM

## 2019-01-01 VITALS
BODY MASS INDEX: 21.34 KG/M2 | OXYGEN SATURATION: 100 % | HEIGHT: 64 IN | WEIGHT: 125 LBS | SYSTOLIC BLOOD PRESSURE: 158 MMHG | HEART RATE: 74 BPM | DIASTOLIC BLOOD PRESSURE: 83 MMHG

## 2019-01-01 DIAGNOSIS — I25.10 ATHEROSCLEROTIC HEART DISEASE OF NATIVE CORONARY ARTERY W/OUT ANGINA PECTORIS: ICD-10-CM

## 2019-01-01 LAB
ALBUMIN SERPL ELPH-MCNC: 4.4 G/DL
ALP BLD-CCNC: 49 U/L
ALT SERPL-CCNC: 23 U/L
ANION GAP SERPL CALC-SCNC: 14 MMOL/L
AST SERPL-CCNC: 24 U/L
BILIRUB SERPL-MCNC: 0.2 MG/DL
BUN SERPL-MCNC: 34 MG/DL
CALCIUM SERPL-MCNC: 9.9 MG/DL
CHLORIDE SERPL-SCNC: 108 MMOL/L
CHOLEST SERPL-MCNC: 151 MG/DL
CHOLEST/HDLC SERPL: 2.4 RATIO
CO2 SERPL-SCNC: 26 MMOL/L
CREAT SERPL-MCNC: 0.97 MG/DL
GLUCOSE SERPL-MCNC: 83 MG/DL
HDLC SERPL-MCNC: 62 MG/DL
LDLC SERPL CALC-MCNC: 72 MG/DL
POTASSIUM SERPL-SCNC: 4 MMOL/L
PROT SERPL-MCNC: 6.2 G/DL
SODIUM SERPL-SCNC: 148 MMOL/L
TRIGL SERPL-MCNC: 83 MG/DL

## 2019-01-01 PROCEDURE — 78452 HT MUSCLE IMAGE SPECT MULT: CPT

## 2019-01-01 PROCEDURE — 93000 ELECTROCARDIOGRAM COMPLETE: CPT

## 2019-01-01 PROCEDURE — A9500: CPT

## 2019-01-01 PROCEDURE — 99214 OFFICE O/P EST MOD 30 MIN: CPT

## 2019-01-01 PROCEDURE — 94010 BREATHING CAPACITY TEST: CPT

## 2019-01-01 PROCEDURE — 93015 CV STRESS TEST SUPVJ I&R: CPT

## 2019-01-01 PROCEDURE — 95012 NITRIC OXIDE EXP GAS DETER: CPT

## 2019-01-01 PROCEDURE — 99214 OFFICE O/P EST MOD 30 MIN: CPT | Mod: 25

## 2019-01-01 RX ORDER — LEVOCETIRIZINE DIHYDROCHLORIDE 5 MG/1
5 TABLET ORAL
Qty: 1 | Refills: 3 | Status: ACTIVE | COMMUNITY
Start: 2019-01-01 | End: 1900-01-01

## 2019-01-01 RX ORDER — FLUTICASONE PROPIONATE 50 UG/1
50 SPRAY, METERED NASAL TWICE DAILY
Qty: 3 | Refills: 3 | Status: ACTIVE | COMMUNITY
Start: 2019-01-01 | End: 1900-01-01

## 2019-01-01 RX ORDER — LEVOTHYROXINE SODIUM 0.03 MG/1
25 TABLET ORAL DAILY
Qty: 90 | Refills: 0 | Status: ACTIVE | COMMUNITY

## 2019-01-01 RX ORDER — MONTELUKAST 10 MG/1
10 TABLET, FILM COATED ORAL
Qty: 1 | Refills: 3 | Status: ACTIVE | COMMUNITY
Start: 2019-01-01 | End: 1900-01-01

## 2019-04-19 ENCOUNTER — TRANSCRIPTION ENCOUNTER (OUTPATIENT)
Age: 79
End: 2019-04-19

## 2019-05-16 ENCOUNTER — NON-APPOINTMENT (OUTPATIENT)
Age: 79
End: 2019-05-16

## 2019-05-16 ENCOUNTER — APPOINTMENT (OUTPATIENT)
Dept: PULMONOLOGY | Facility: CLINIC | Age: 79
End: 2019-05-16
Payer: MEDICARE

## 2019-05-16 VITALS
HEIGHT: 64 IN | BODY MASS INDEX: 21.17 KG/M2 | WEIGHT: 124 LBS | SYSTOLIC BLOOD PRESSURE: 150 MMHG | OXYGEN SATURATION: 100 % | HEART RATE: 78 BPM | RESPIRATION RATE: 17 BRPM | DIASTOLIC BLOOD PRESSURE: 70 MMHG

## 2019-05-16 DIAGNOSIS — Z86.39 PERSONAL HISTORY OF OTHER ENDOCRINE, NUTRITIONAL AND METABOLIC DISEASE: ICD-10-CM

## 2019-05-16 PROCEDURE — 99214 OFFICE O/P EST MOD 30 MIN: CPT | Mod: 25

## 2019-05-16 PROCEDURE — 94010 BREATHING CAPACITY TEST: CPT

## 2019-05-16 RX ORDER — LEVOCETIRIZINE DIHYDROCHLORIDE 5 MG/1
5 TABLET ORAL
Qty: 1 | Refills: 1 | Status: ACTIVE | COMMUNITY
Start: 2019-05-16 | End: 1900-01-01

## 2019-05-16 RX ORDER — FLUOCINONIDE 0.5 MG/G
0.05 CREAM TOPICAL
Qty: 1 | Refills: 5 | Status: ACTIVE | COMMUNITY
Start: 2019-05-16 | End: 1900-01-01

## 2019-05-16 NOTE — ADDENDUM
[FreeTextEntry1] : Documented by Kwasi Ambriz acting as a scribe for Dr. Asim Narayan on 05/16/2019 \par \par All medical record entries made by the Scribe were at my, Dr. Asim Narayan's, direction and personally dictated by me on 05/16/2019  . I have reviewed the chart and agree that the record accurately reflects my personal performance of the history, physical exam, procedure, assessment and plan. I have also personally directed, reviewed, and agree with the discharge instructions. \par \par

## 2019-05-16 NOTE — HISTORY OF PRESENT ILLNESS
[FreeTextEntry1] : Mr. Wagner is a 78 year old male with a history of heart disease, asthma, allergies, GERD, hyperlipidemia, RAD, SOB, who comes in today for a follow up  visit. His chief complaint is exertional angina \par -he reports he is currently feeling well\par -he states his breathing has been aggravated by the change in weather\par -he notes recently saw an endocrinologist and was prescribed Synthroid for abnormal TSH levels \par -he reports his weight is stable \par -he states he suffers from nocturia one to two times nightly \par -he notes he still has exertional angina if he heavily exerts himself \par -he reports he will occasionally have heartburn at night \par -he states he is breathing well with no SOB, even on his back or at night\par -he denies any diarrhea, constipation, dysphagia, dizziness, sour taste in the mouth, heartburn, reflux, itchy eyes, itchy ears, leg swelling, leg pain

## 2019-05-16 NOTE — REASON FOR VISIT
[Follow-Up] : a follow-up visit [FreeTextEntry1] : allergies, CAD, asthma, GERD, hyperlipidemia, RAD, SOB,

## 2019-05-16 NOTE — PHYSICAL EXAM
[General Appearance - Well Developed] : well developed [Normal Appearance] : normal appearance [No Deformities] : no deformities [General Appearance - Well Nourished] : well nourished [Well Groomed] : well groomed [General Appearance - In No Acute Distress] : no acute distress [Normal Conjunctiva] : the conjunctiva exhibited no abnormalities [Eyelids - No Xanthelasma] : the eyelids demonstrated no xanthelasmas [Normal Oropharynx] : normal oropharynx [II] : II [Neck Appearance] : the appearance of the neck was normal [Neck Cervical Mass (___cm)] : no neck mass was observed [Jugular Venous Distention Increased] : there was no jugular-venous distention [Thyroid Diffuse Enlargement] : the thyroid was not enlarged [Thyroid Nodule] : there were no palpable thyroid nodules [Heart Rate And Rhythm] : heart rate and rhythm were normal [Heart Sounds] : normal S1 and S2 [Murmurs] : no murmurs present [Respiration, Rhythm And Depth] : normal respiratory rhythm and effort [Exaggerated Use Of Accessory Muscles For Inspiration] : no accessory muscle use [Auscultation Breath Sounds / Voice Sounds] : lungs were clear to auscultation bilaterally [Abdomen Soft] : soft [Abdomen Tenderness] : non-tender [Abdomen Mass (___ Cm)] : no abdominal mass palpated [Abnormal Walk] : normal gait [Gait - Sufficient For Exercise Testing] : the gait was sufficient for exercise testing [Nail Clubbing] : no clubbing of the fingernails [Cyanosis, Localized] : no localized cyanosis [Petechial Hemorrhages (___cm)] : no petechial hemorrhages [Skin Color & Pigmentation] : normal skin color and pigmentation [] : no rash [No Venous Stasis] : no venous stasis [Skin Lesions] : no skin lesions [No Skin Ulcers] : no skin ulcer [No Xanthoma] : no  xanthoma was observed [Deep Tendon Reflexes (DTR)] : deep tendon reflexes were 2+ and symmetric [Sensation] : the sensory exam was normal to light touch and pinprick [No Focal Deficits] : no focal deficits [Oriented To Time, Place, And Person] : oriented to person, place, and time [Impaired Insight] : insight and judgment were intact [Affect] : the affect was normal [FreeTextEntry1] : I:E ratio 1:3; clear

## 2019-05-16 NOTE — ASSESSMENT
[FreeTextEntry1] : Mr. Wagner is doing well from a pulmonary perspective and is stable. He has a h/o of RADS, allergy, GERD, RA, CAD, BPH.\par \par His shortness of breath is multifactorial due to:\par -asthma\par -cardiac disease\par -poor breathing mechanics \par \par problem 1: RAD/ mild intermittent asthma\par -continue to use Singulair 10 mg before bed\par \par -Asthma is  believed to be caused by inherited (genetic) and environmental factor, but its exact cause is unknown. Asthma may be triggered by allergens, lung infections, or irritants in the air. Asthma triggers are different for each person\par -Inhaler technique reviewed as well as oral hygiene techniques reviewed with patient. Avoidance of cold air, extremes of temperature, rescue inhaler should be used before exercise. Order of medication reviewed with patient. Recommended use of a cool mist humidifier in the bedroom.\par \par problem 2: allergic rhinitis\par -continue to use Xyzal 5 mg before bed\par -continue to use Flonase 1 sniff/nostril BID\par \par -Environmental measures for allergies were encouraged including mattress and pillow cover, air purifier, and environmental controls.\par \par problem 3: GERD\par -continue to use Omeprazole 40 mg before breakfast\par \par -Rule of 2s: avoid eating too much, eating too late, eating too spicy, eating two hours before bed\par -Things to avoid including overeating, spicy foods, tight clothing, eating within three hours of bed, this list is not all inclusive. \par -For treatment of reflux, possible options discussed including diet control, H2 blockers, PPIs, as well as coating motility agents discussed as treatment options. Timing of meals and proximity of last meal to sleep were discussed. If symptoms persist, a formal gastrointestinal evaluation is needed.\par \par problem 4: overweight- improved. \par -Weight loss, exercise, and diet control were discussed and are highly encouraged. Treatment options were given such as, aqua therapy, and contacting a nutritionist. Recommended to use the elliptical, stationary bike, less use of treadmill.  Obesity is associated with worsening asthma, shortness of breath, and potential for cardiac disease, diabetes, and other underlying medical conditions.\par \par problem 5: cardiac\par -recommended to follow up with his cardiologist, Rubin Odell\par \par problem 6: muscle cramps\par -recommended Theraworx for muscle ramps\par \par problem 7: health maintenance \par -s/p influenza vaccine -2018\par -recommended strep pneumonia vaccines: Prevnar-13 vaccine(January 2017), followed by Pneumo vaccine 23 one year following (repeat in January 2018)\par -recommended early intervention for URIs\par -recommended regular osteoporosis evaluations\par -recommended early dermatological evaluations\par -recommended after the age of 50 to the age of 70, colonoscopy every 5 years \par \par problem 6; nocturnal muscle cramps \par -recommended MyoCalm \par -recommended to use [place] cream\par \par F/U in 6 months\par He is encouraged to call with any changes, concerns, or questions. 
Emergent/ED

## 2019-05-16 NOTE — PROCEDURE
[FreeTextEntry1] : PFT revealed normal flows, with a FEV1 of  1.91 L, which is 85% of predicted, with a normal flow volume loop\par  \par CBC (January 2019) reveals WBC 7, HGB 11.2, . Eosinophil 230, 3.4%

## 2019-11-20 NOTE — ASSESSMENT
[FreeTextEntry1] : Mr. Wagner is a 79 year old male with a history of RADS, allergy, GERD, RA, CAD, and BPH who comes to the office today for a follow up pulmonary evaluation. He is currently stable from a pulmonary perspective - recent melanoma. \par \par His shortness of breath is multifactorial due to:\par -asthma\par -cardiac disease\par -poor breathing mechanics \par \par problem 1: RADs / mild intermittent asthma\par -continue Singulair 10 mg before bed\par -Asthma is  believed to be caused by inherited (genetic) and environmental factor, but its exact cause is unknown. Asthma may be triggered by allergens, lung infections, or irritants in the air. Asthma triggers are different for each person\par -Inhaler technique reviewed as well as oral hygiene techniques reviewed with patient. Avoidance of cold air, extremes of temperature, rescue inhaler should be used before exercise. Order of medication reviewed with patient. Recommended use of a cool mist humidifier in the bedroom.\par \par problem 2: allergic rhinitis\par -continue Xyzal 5 mg QHS\par -continue Flonase 1 sniff/nostril BID\par -Environmental measures for allergies were encouraged including mattress and pillow cover, air purifier, and environmental controls.\par \par problem 3: GERD\par -add Protonix 40 mg QAM, pre-breakfast \par -recommended to use Probiotic Supreme\par -Rule of 2s: avoid eating too much, eating too late, eating too spicy, eating two hours before bed\par -Things to avoid including overeating, spicy foods, tight clothing, eating within three hours of bed, this list is not all inclusive. \par -For treatment of reflux, possible options discussed including diet control, H2 blockers, PPIs, as well as coating motility agents discussed as treatment options. Timing of meals and proximity of last meal to sleep were discussed. If symptoms persist, a formal gastrointestinal evaluation is needed.\par \par problem 4: overweight -(improved) \par -Weight loss, exercise, and diet control were discussed and are highly encouraged. Treatment options were given such as, aqua therapy, and contacting a nutritionist. Recommended to use the elliptical, stationary bike, less use of treadmill.  Obesity is associated with worsening asthma, shortness of breath, and potential for cardiac disease, diabetes, and other underlying medical conditions.\par \par problem 5: cardiac\par -recommended to follow up with his cardiologist, Rubin Odell\par \par problem 6: muscle cramps\par -recommended Theraworx for muscle ramps\par \par problem 7: nocturnal muscle cramps \par -recommended MyoCalm \par -recommended to use [place] cream\par \par problem 8: health maintenance \par -s/p influenza vaccine -2019\par -recommended strep pneumonia vaccines: Prevnar-13 vaccine(January 2017), followed by Pneumo vaccine 23 one year following (repeat in January 2018)\par -recommended early intervention for URIs\par -recommended regular osteoporosis evaluations\par -recommended early dermatological evaluations\par -recommended after the age of 50 to the age of 70, colonoscopy every 5 years \par \par \par F/U in 6 months - SPI / NIOX\par He is encouraged to call with any changes, concerns, or questions.

## 2019-11-20 NOTE — PROCEDURE
[FreeTextEntry1] : PFT - spi reveals normal flows; FEV1 is 1.96 which is 99% of predicted, normal flow volume loop \par \par FENO was 33; a normal value being less than 25\par Fractional exhaled nitric oxide (FENO) is regarded as a simple, noninvasive method for assessing eosinophilic airway inflammation. Produced by a variety of cells within the lung, nitric oxide (NO) concentrations are generally low in healthy individuals. However, high concentrations of NO appear to be involved in nonspecific host defense mechanisms and chronic inflammatory diseases such as asthma. The American Thoracic Society (ATS) therefore has recommended using FENO to aid in the diagnosis and monitoring of eosinophilic airway inflammation and asthma, and for identifying steroid responsive individuals whose chronic respiratory symptoms may be caused by airway inflammation.

## 2019-11-20 NOTE — ADDENDUM
[FreeTextEntry1] : All medical record entries made by robert Valencia were at Dr. Asim Narayan's direction and personally dictated by me on 11/20/2019. I have reviewed the chart and agree that the record accurately reflects my personal performance of the history, physical exam, assessment and plan. I have also personally directed, reviewed, and agree with the discharge instructions. \par

## 2019-11-20 NOTE — HISTORY OF PRESENT ILLNESS
[FreeTextEntry1] : Mr. Wagner is a 79 year old male with a history of heart disease, asthma, allergies, GERD, RADs, SOB, who comes in today for a follow up  visit. 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 he feels that his sensation has been present more frequently since being on Imiquimod \par -he has recently lost a significant amount of weight unintentionally\par -he reports that his bowel movements have been very loose and foul smelling\par -his energy level has been fair, rates it 5-6 /10. he has been sleeping well, as he has no difficulty falling asleep, however he typically wake up at 5-6 am to urinate\par -he reports that his sinuses are persistently mildly active with a PND. \par -he reports feeling persistently fatigued\par -he has been trying to eat as much as he can, however his appetite has been poor\par -he denies any headaches, nausea, vomiting, fever, chills, sweats, chest pain, chest pressure, constipation, dysphagia, dizziness, leg swelling, leg pain, itchy eyes, itchy ears, heartburn, reflux, or sour taste in the mouth, SOB.

## 2020-01-01 ENCOUNTER — APPOINTMENT (OUTPATIENT)
Dept: GASTROENTEROLOGY | Facility: CLINIC | Age: 80
End: 2020-01-01
Payer: MEDICARE

## 2020-01-01 ENCOUNTER — RESULT REVIEW (OUTPATIENT)
Age: 80
End: 2020-01-01

## 2020-01-01 ENCOUNTER — TRANSCRIPTION ENCOUNTER (OUTPATIENT)
Age: 80
End: 2020-01-01

## 2020-01-01 ENCOUNTER — APPOINTMENT (OUTPATIENT)
Dept: GASTROENTEROLOGY | Facility: AMBULATORY MEDICAL SERVICES | Age: 80
End: 2020-01-01

## 2020-01-01 ENCOUNTER — APPOINTMENT (OUTPATIENT)
Dept: INTERNAL MEDICINE | Facility: CLINIC | Age: 80
End: 2020-01-01
Payer: MEDICARE

## 2020-01-01 ENCOUNTER — APPOINTMENT (OUTPATIENT)
Dept: GASTROENTEROLOGY | Facility: HOSPITAL | Age: 80
End: 2020-01-01

## 2020-01-01 ENCOUNTER — INPATIENT (INPATIENT)
Facility: HOSPITAL | Age: 80
LOS: 11 days | Discharge: HOME | DRG: 330 | End: 2020-07-07
Attending: HOSPITALIST | Admitting: INTERNAL MEDICINE
Payer: MEDICARE

## 2020-01-01 ENCOUNTER — NON-APPOINTMENT (OUTPATIENT)
Age: 80
End: 2020-01-01

## 2020-01-01 ENCOUNTER — APPOINTMENT (OUTPATIENT)
Dept: PULMONOLOGY | Facility: CLINIC | Age: 80
End: 2020-01-01
Payer: MEDICARE

## 2020-01-01 ENCOUNTER — APPOINTMENT (OUTPATIENT)
Dept: CARDIOLOGY | Facility: CLINIC | Age: 80
End: 2020-01-01
Payer: MEDICARE

## 2020-01-01 VITALS
SYSTOLIC BLOOD PRESSURE: 120 MMHG | OXYGEN SATURATION: 96 % | TEMPERATURE: 98.4 F | HEIGHT: 64.5 IN | DIASTOLIC BLOOD PRESSURE: 60 MMHG | BODY MASS INDEX: 19.73 KG/M2 | HEART RATE: 74 BPM | RESPIRATION RATE: 14 BRPM | WEIGHT: 117 LBS

## 2020-01-01 VITALS
BODY MASS INDEX: 18.55 KG/M2 | SYSTOLIC BLOOD PRESSURE: 163 MMHG | DIASTOLIC BLOOD PRESSURE: 80 MMHG | HEIGHT: 64.5 IN | WEIGHT: 110 LBS | OXYGEN SATURATION: 99 % | HEART RATE: 75 BPM

## 2020-01-01 VITALS
OXYGEN SATURATION: 93 % | DIASTOLIC BLOOD PRESSURE: 55 MMHG | WEIGHT: 104.06 LBS | RESPIRATION RATE: 16 BRPM | HEART RATE: 79 BPM | SYSTOLIC BLOOD PRESSURE: 112 MMHG | TEMPERATURE: 98 F | HEIGHT: 63 IN

## 2020-01-01 VITALS
HEART RATE: 87 BPM | DIASTOLIC BLOOD PRESSURE: 65 MMHG | TEMPERATURE: 98 F | RESPIRATION RATE: 18 BRPM | SYSTOLIC BLOOD PRESSURE: 118 MMHG | OXYGEN SATURATION: 94 %

## 2020-01-01 VITALS
BODY MASS INDEX: 18.21 KG/M2 | OXYGEN SATURATION: 100 % | HEART RATE: 80 BPM | DIASTOLIC BLOOD PRESSURE: 66 MMHG | WEIGHT: 108 LBS | SYSTOLIC BLOOD PRESSURE: 133 MMHG | HEIGHT: 64.5 IN

## 2020-01-01 DIAGNOSIS — K92.2 GASTROINTESTINAL HEMORRHAGE, UNSPECIFIED: ICD-10-CM

## 2020-01-01 DIAGNOSIS — R25.2 CRAMP AND SPASM: ICD-10-CM

## 2020-01-01 DIAGNOSIS — E78.5 HYPERLIPIDEMIA, UNSPECIFIED: ICD-10-CM

## 2020-01-01 DIAGNOSIS — I25.10 ATHEROSCLEROTIC HEART DISEASE OF NATIVE CORONARY ARTERY W/OUT ANGINA PECTORIS: ICD-10-CM

## 2020-01-01 DIAGNOSIS — R33.9 RETENTION OF URINE, UNSPECIFIED: ICD-10-CM

## 2020-01-01 DIAGNOSIS — Z98.890 OTHER SPECIFIED POSTPROCEDURAL STATES: Chronic | ICD-10-CM

## 2020-01-01 DIAGNOSIS — D63.8 ANEMIA IN OTHER CHRONIC DISEASES CLASSIFIED ELSEWHERE: ICD-10-CM

## 2020-01-01 DIAGNOSIS — D50.0 IRON DEFICIENCY ANEMIA SECONDARY TO BLOOD LOSS (CHRONIC): ICD-10-CM

## 2020-01-01 DIAGNOSIS — M19.90 UNSPECIFIED OSTEOARTHRITIS, UNSPECIFIED SITE: ICD-10-CM

## 2020-01-01 DIAGNOSIS — E78.2 MIXED HYPERLIPIDEMIA: ICD-10-CM

## 2020-01-01 DIAGNOSIS — R06.02 SHORTNESS OF BREATH: ICD-10-CM

## 2020-01-01 DIAGNOSIS — M06.9 RHEUMATOID ARTHRITIS, UNSPECIFIED: ICD-10-CM

## 2020-01-01 DIAGNOSIS — R19.7 DIARRHEA, UNSPECIFIED: ICD-10-CM

## 2020-01-01 DIAGNOSIS — D64.9 ANEMIA, UNSPECIFIED: ICD-10-CM

## 2020-01-01 DIAGNOSIS — C61 MALIGNANT NEOPLASM OF PROSTATE: ICD-10-CM

## 2020-01-01 DIAGNOSIS — J30.9 ALLERGIC RHINITIS, UNSPECIFIED: ICD-10-CM

## 2020-01-01 DIAGNOSIS — J45.30 MILD PERSISTENT ASTHMA, UNCOMPLICATED: ICD-10-CM

## 2020-01-01 DIAGNOSIS — I25.10 ATHEROSCLEROTIC HEART DISEASE OF NATIVE CORONARY ARTERY WITHOUT ANGINA PECTORIS: ICD-10-CM

## 2020-01-01 DIAGNOSIS — Z85.820 PERSONAL HISTORY OF MALIGNANT MELANOMA OF SKIN: ICD-10-CM

## 2020-01-01 DIAGNOSIS — R63.4 ABNORMAL WEIGHT LOSS: ICD-10-CM

## 2020-01-01 DIAGNOSIS — N17.9 ACUTE KIDNEY FAILURE, UNSPECIFIED: ICD-10-CM

## 2020-01-01 DIAGNOSIS — Z01.818 ENCOUNTER FOR OTHER PREPROCEDURAL EXAMINATION: ICD-10-CM

## 2020-01-01 DIAGNOSIS — Z87.898 PERSONAL HISTORY OF OTHER SPECIFIED CONDITIONS: ICD-10-CM

## 2020-01-01 DIAGNOSIS — Z90.89 ACQUIRED ABSENCE OF OTHER ORGANS: Chronic | ICD-10-CM

## 2020-01-01 DIAGNOSIS — E03.9 HYPOTHYROIDISM, UNSPECIFIED: ICD-10-CM

## 2020-01-01 DIAGNOSIS — Z29.9 ENCOUNTER FOR PROPHYLACTIC MEASURES, UNSPECIFIED: ICD-10-CM

## 2020-01-01 DIAGNOSIS — A04.72 ENTEROCOLITIS DUE TO CLOSTRIDIUM DIFFICILE, NOT SPECIFIED AS RECURRENT: ICD-10-CM

## 2020-01-01 DIAGNOSIS — R60.9 EDEMA, UNSPECIFIED: ICD-10-CM

## 2020-01-01 DIAGNOSIS — K21.9 GASTRO-ESOPHAGEAL REFLUX DISEASE W/OUT ESOPHAGITIS: ICD-10-CM

## 2020-01-01 DIAGNOSIS — I10 ESSENTIAL (PRIMARY) HYPERTENSION: ICD-10-CM

## 2020-01-01 DIAGNOSIS — J45.909 UNSPECIFIED ASTHMA, UNCOMPLICATED: ICD-10-CM

## 2020-01-01 DIAGNOSIS — K63.89 OTHER SPECIFIED DISEASES OF INTESTINE: ICD-10-CM

## 2020-01-01 LAB
ALBUMIN SERPL ELPH-MCNC: 1.9 G/DL — LOW (ref 3.3–5)
ALBUMIN SERPL ELPH-MCNC: 2.3 G/DL — LOW (ref 3.3–5)
ALBUMIN SERPL ELPH-MCNC: 2.3 G/DL — LOW (ref 3.3–5)
ALBUMIN SERPL ELPH-MCNC: 2.4 G/DL — LOW (ref 3.3–5)
ALBUMIN SERPL ELPH-MCNC: 3.1 G/DL — LOW (ref 3.3–5)
ALP SERPL-CCNC: 107 U/L — SIGNIFICANT CHANGE UP (ref 40–120)
ALP SERPL-CCNC: 107 U/L — SIGNIFICANT CHANGE UP (ref 40–120)
ALP SERPL-CCNC: 147 U/L — HIGH (ref 40–120)
ALP SERPL-CCNC: 161 U/L — HIGH (ref 40–120)
ALP SERPL-CCNC: 96 U/L — SIGNIFICANT CHANGE UP (ref 40–120)
ALT FLD-CCNC: 41 U/L — SIGNIFICANT CHANGE UP (ref 12–78)
ALT FLD-CCNC: 46 U/L — SIGNIFICANT CHANGE UP (ref 12–78)
ALT FLD-CCNC: 51 U/L — SIGNIFICANT CHANGE UP (ref 12–78)
ALT FLD-CCNC: 53 U/L — SIGNIFICANT CHANGE UP (ref 12–78)
ALT FLD-CCNC: 54 U/L — SIGNIFICANT CHANGE UP (ref 12–78)
ANION GAP SERPL CALC-SCNC: 10 MMOL/L — SIGNIFICANT CHANGE UP (ref 5–17)
ANION GAP SERPL CALC-SCNC: 4 MMOL/L — LOW (ref 5–17)
ANION GAP SERPL CALC-SCNC: 5 MMOL/L — SIGNIFICANT CHANGE UP (ref 5–17)
ANION GAP SERPL CALC-SCNC: 6 MMOL/L — SIGNIFICANT CHANGE UP (ref 5–17)
ANION GAP SERPL CALC-SCNC: 8 MMOL/L — SIGNIFICANT CHANGE UP (ref 5–17)
ANION GAP SERPL CALC-SCNC: 8 MMOL/L — SIGNIFICANT CHANGE UP (ref 5–17)
APPEARANCE UR: CLEAR — SIGNIFICANT CHANGE UP
APPEARANCE UR: CLEAR — SIGNIFICANT CHANGE UP
APTT BLD: 31.7 SEC — SIGNIFICANT CHANGE UP (ref 28.5–37)
AST SERPL-CCNC: 50 U/L — HIGH (ref 15–37)
AST SERPL-CCNC: 53 U/L — HIGH (ref 15–37)
AST SERPL-CCNC: 58 U/L — HIGH (ref 15–37)
AST SERPL-CCNC: 64 U/L — HIGH (ref 15–37)
AST SERPL-CCNC: 95 U/L — HIGH (ref 15–37)
BACTERIA # UR AUTO: ABNORMAL
BASOPHILS # BLD AUTO: 0 K/UL — SIGNIFICANT CHANGE UP (ref 0–0.2)
BASOPHILS # BLD AUTO: 0.01 K/UL — SIGNIFICANT CHANGE UP (ref 0–0.2)
BASOPHILS # BLD AUTO: 0.02 K/UL — SIGNIFICANT CHANGE UP (ref 0–0.2)
BASOPHILS # BLD AUTO: 0.02 K/UL — SIGNIFICANT CHANGE UP (ref 0–0.2)
BASOPHILS # BLD AUTO: 0.03 K/UL — SIGNIFICANT CHANGE UP (ref 0–0.2)
BASOPHILS # BLD AUTO: 0.03 K/UL — SIGNIFICANT CHANGE UP (ref 0–0.2)
BASOPHILS NFR BLD AUTO: 0 % — SIGNIFICANT CHANGE UP (ref 0–2)
BASOPHILS NFR BLD AUTO: 0.1 % — SIGNIFICANT CHANGE UP (ref 0–2)
BASOPHILS NFR BLD AUTO: 0.1 % — SIGNIFICANT CHANGE UP (ref 0–2)
BASOPHILS NFR BLD AUTO: 0.2 % — SIGNIFICANT CHANGE UP (ref 0–2)
BASOPHILS NFR BLD AUTO: 0.3 % — SIGNIFICANT CHANGE UP (ref 0–2)
BASOPHILS NFR BLD AUTO: 0.3 % — SIGNIFICANT CHANGE UP (ref 0–2)
BILIRUB SERPL-MCNC: 0.3 MG/DL — SIGNIFICANT CHANGE UP (ref 0.2–1.2)
BILIRUB SERPL-MCNC: 0.3 MG/DL — SIGNIFICANT CHANGE UP (ref 0.2–1.2)
BILIRUB SERPL-MCNC: 0.5 MG/DL — SIGNIFICANT CHANGE UP (ref 0.2–1.2)
BILIRUB SERPL-MCNC: 0.5 MG/DL — SIGNIFICANT CHANGE UP (ref 0.2–1.2)
BILIRUB SERPL-MCNC: 0.7 MG/DL — SIGNIFICANT CHANGE UP (ref 0.2–1.2)
BILIRUB UR-MCNC: NEGATIVE — SIGNIFICANT CHANGE UP
BILIRUB UR-MCNC: NEGATIVE — SIGNIFICANT CHANGE UP
BUN SERPL-MCNC: 10 MG/DL — SIGNIFICANT CHANGE UP (ref 7–23)
BUN SERPL-MCNC: 13 MG/DL — SIGNIFICANT CHANGE UP (ref 7–23)
BUN SERPL-MCNC: 18 MG/DL — SIGNIFICANT CHANGE UP (ref 7–23)
BUN SERPL-MCNC: 20 MG/DL — SIGNIFICANT CHANGE UP (ref 7–23)
BUN SERPL-MCNC: 30 MG/DL — HIGH (ref 7–23)
BUN SERPL-MCNC: 6 MG/DL — LOW (ref 7–23)
BUN SERPL-MCNC: 8 MG/DL — SIGNIFICANT CHANGE UP (ref 7–23)
BUN SERPL-MCNC: 9 MG/DL — SIGNIFICANT CHANGE UP (ref 7–23)
BUN SERPL-MCNC: 9 MG/DL — SIGNIFICANT CHANGE UP (ref 7–23)
CALCIUM SERPL-MCNC: 8.1 MG/DL — LOW (ref 8.5–10.1)
CALCIUM SERPL-MCNC: 8.2 MG/DL — LOW (ref 8.5–10.1)
CALCIUM SERPL-MCNC: 8.2 MG/DL — LOW (ref 8.5–10.1)
CALCIUM SERPL-MCNC: 8.3 MG/DL — LOW (ref 8.5–10.1)
CALCIUM SERPL-MCNC: 8.3 MG/DL — LOW (ref 8.5–10.1)
CALCIUM SERPL-MCNC: 8.4 MG/DL — LOW (ref 8.5–10.1)
CALCIUM SERPL-MCNC: 8.5 MG/DL — SIGNIFICANT CHANGE UP (ref 8.5–10.1)
CALCIUM SERPL-MCNC: 8.6 MG/DL — SIGNIFICANT CHANGE UP (ref 8.5–10.1)
CALCIUM SERPL-MCNC: 9.2 MG/DL — SIGNIFICANT CHANGE UP (ref 8.5–10.1)
CHLORIDE SERPL-SCNC: 105 MMOL/L — SIGNIFICANT CHANGE UP (ref 96–108)
CHLORIDE SERPL-SCNC: 106 MMOL/L — SIGNIFICANT CHANGE UP (ref 96–108)
CHLORIDE SERPL-SCNC: 107 MMOL/L — SIGNIFICANT CHANGE UP (ref 96–108)
CHLORIDE SERPL-SCNC: 108 MMOL/L — SIGNIFICANT CHANGE UP (ref 96–108)
CHLORIDE SERPL-SCNC: 109 MMOL/L — HIGH (ref 96–108)
CHLORIDE SERPL-SCNC: 110 MMOL/L — HIGH (ref 96–108)
CHLORIDE SERPL-SCNC: 110 MMOL/L — HIGH (ref 96–108)
CHLORIDE SERPL-SCNC: 112 MMOL/L — HIGH (ref 96–108)
CO2 SERPL-SCNC: 24 MMOL/L — SIGNIFICANT CHANGE UP (ref 22–31)
CO2 SERPL-SCNC: 25 MMOL/L — SIGNIFICANT CHANGE UP (ref 22–31)
CO2 SERPL-SCNC: 25 MMOL/L — SIGNIFICANT CHANGE UP (ref 22–31)
CO2 SERPL-SCNC: 26 MMOL/L — SIGNIFICANT CHANGE UP (ref 22–31)
CO2 SERPL-SCNC: 26 MMOL/L — SIGNIFICANT CHANGE UP (ref 22–31)
CO2 SERPL-SCNC: 27 MMOL/L — SIGNIFICANT CHANGE UP (ref 22–31)
CO2 SERPL-SCNC: 28 MMOL/L — SIGNIFICANT CHANGE UP (ref 22–31)
CO2 SERPL-SCNC: 29 MMOL/L — SIGNIFICANT CHANGE UP (ref 22–31)
COLOR SPEC: YELLOW — SIGNIFICANT CHANGE UP
COLOR SPEC: YELLOW — SIGNIFICANT CHANGE UP
CREAT ?TM UR-MCNC: 36 MG/DL — SIGNIFICANT CHANGE UP
CREAT SERPL-MCNC: 0.57 MG/DL — SIGNIFICANT CHANGE UP (ref 0.5–1.3)
CREAT SERPL-MCNC: 0.6 MG/DL — SIGNIFICANT CHANGE UP (ref 0.5–1.3)
CREAT SERPL-MCNC: 0.62 MG/DL — SIGNIFICANT CHANGE UP (ref 0.5–1.3)
CREAT SERPL-MCNC: 0.71 MG/DL — SIGNIFICANT CHANGE UP (ref 0.5–1.3)
CREAT SERPL-MCNC: 0.71 MG/DL — SIGNIFICANT CHANGE UP (ref 0.5–1.3)
CREAT SERPL-MCNC: 0.73 MG/DL — SIGNIFICANT CHANGE UP (ref 0.5–1.3)
CREAT SERPL-MCNC: 0.74 MG/DL — SIGNIFICANT CHANGE UP (ref 0.5–1.3)
CREAT SERPL-MCNC: 0.78 MG/DL — SIGNIFICANT CHANGE UP (ref 0.5–1.3)
CREAT SERPL-MCNC: 1 MG/DL — SIGNIFICANT CHANGE UP (ref 0.5–1.3)
CREAT SERPL-MCNC: 1.5 MG/DL — HIGH (ref 0.5–1.3)
CULTURE RESULTS: NO GROWTH — SIGNIFICANT CHANGE UP
CULTURE RESULTS: NO GROWTH — SIGNIFICANT CHANGE UP
DIFF PNL FLD: ABNORMAL
DIFF PNL FLD: NEGATIVE — SIGNIFICANT CHANGE UP
EOSINOPHIL # BLD AUTO: 0 K/UL — SIGNIFICANT CHANGE UP (ref 0–0.5)
EOSINOPHIL # BLD AUTO: 0 K/UL — SIGNIFICANT CHANGE UP (ref 0–0.5)
EOSINOPHIL # BLD AUTO: 0.06 K/UL — SIGNIFICANT CHANGE UP (ref 0–0.5)
EOSINOPHIL # BLD AUTO: 0.06 K/UL — SIGNIFICANT CHANGE UP (ref 0–0.5)
EOSINOPHIL # BLD AUTO: 0.09 K/UL — SIGNIFICANT CHANGE UP (ref 0–0.5)
EOSINOPHIL # BLD AUTO: 0.09 K/UL — SIGNIFICANT CHANGE UP (ref 0–0.5)
EOSINOPHIL NFR BLD AUTO: 0 % — SIGNIFICANT CHANGE UP (ref 0–6)
EOSINOPHIL NFR BLD AUTO: 0 % — SIGNIFICANT CHANGE UP (ref 0–6)
EOSINOPHIL NFR BLD AUTO: 0.5 % — SIGNIFICANT CHANGE UP (ref 0–6)
EOSINOPHIL NFR BLD AUTO: 0.5 % — SIGNIFICANT CHANGE UP (ref 0–6)
EOSINOPHIL NFR BLD AUTO: 0.7 % — SIGNIFICANT CHANGE UP (ref 0–6)
EOSINOPHIL NFR BLD AUTO: 0.9 % — SIGNIFICANT CHANGE UP (ref 0–6)
EPI CELLS # UR: SIGNIFICANT CHANGE UP
FERRITIN SERPL-MCNC: 645 NG/ML — HIGH (ref 30–400)
GLUCOSE SERPL-MCNC: 118 MG/DL — HIGH (ref 70–99)
GLUCOSE SERPL-MCNC: 76 MG/DL — SIGNIFICANT CHANGE UP (ref 70–99)
GLUCOSE SERPL-MCNC: 79 MG/DL — SIGNIFICANT CHANGE UP (ref 70–99)
GLUCOSE SERPL-MCNC: 80 MG/DL — SIGNIFICANT CHANGE UP (ref 70–99)
GLUCOSE SERPL-MCNC: 81 MG/DL — SIGNIFICANT CHANGE UP (ref 70–99)
GLUCOSE SERPL-MCNC: 81 MG/DL — SIGNIFICANT CHANGE UP (ref 70–99)
GLUCOSE SERPL-MCNC: 82 MG/DL — SIGNIFICANT CHANGE UP (ref 70–99)
GLUCOSE SERPL-MCNC: 83 MG/DL — SIGNIFICANT CHANGE UP (ref 70–99)
GLUCOSE SERPL-MCNC: 88 MG/DL — SIGNIFICANT CHANGE UP (ref 70–99)
GLUCOSE SERPL-MCNC: 88 MG/DL — SIGNIFICANT CHANGE UP (ref 70–99)
GLUCOSE SERPL-MCNC: 97 MG/DL — SIGNIFICANT CHANGE UP (ref 70–99)
GLUCOSE SERPL-MCNC: 99 MG/DL — SIGNIFICANT CHANGE UP (ref 70–99)
GLUCOSE UR QL: NEGATIVE — SIGNIFICANT CHANGE UP
GLUCOSE UR QL: NEGATIVE — SIGNIFICANT CHANGE UP
HCT VFR BLD CALC: 23.3 % — LOW (ref 39–50)
HCT VFR BLD CALC: 24.6 % — LOW (ref 39–50)
HCT VFR BLD CALC: 25.2 % — LOW (ref 39–50)
HCT VFR BLD CALC: 27.9 % — LOW (ref 39–50)
HCT VFR BLD CALC: 28.5 % — LOW (ref 39–50)
HCT VFR BLD CALC: 29.5 % — LOW (ref 39–50)
HCT VFR BLD CALC: 30.2 % — LOW (ref 39–50)
HCT VFR BLD CALC: 30.4 % — LOW (ref 39–50)
HCT VFR BLD CALC: 30.5 % — LOW (ref 39–50)
HCT VFR BLD CALC: 30.8 % — LOW (ref 39–50)
HCT VFR BLD CALC: 30.8 % — LOW (ref 39–50)
HCT VFR BLD CALC: 30.9 % — LOW (ref 39–50)
HCT VFR BLD CALC: 31.6 % — LOW (ref 39–50)
HCT VFR BLD CALC: 32.1 % — LOW (ref 39–50)
HCT VFR BLD CALC: 32.6 % — LOW (ref 39–50)
HGB BLD-MCNC: 10.2 G/DL — LOW (ref 13–17)
HGB BLD-MCNC: 10.2 G/DL — LOW (ref 13–17)
HGB BLD-MCNC: 7.4 G/DL — LOW (ref 13–17)
HGB BLD-MCNC: 7.5 G/DL — LOW (ref 13–17)
HGB BLD-MCNC: 7.7 G/DL — LOW (ref 13–17)
HGB BLD-MCNC: 9 G/DL — LOW (ref 13–17)
HGB BLD-MCNC: 9.1 G/DL — LOW (ref 13–17)
HGB BLD-MCNC: 9.4 G/DL — LOW (ref 13–17)
HGB BLD-MCNC: 9.5 G/DL — LOW (ref 13–17)
HGB BLD-MCNC: 9.6 G/DL — LOW (ref 13–17)
HGB BLD-MCNC: 9.7 G/DL — LOW (ref 13–17)
IMM GRANULOCYTES NFR BLD AUTO: 0.7 % — SIGNIFICANT CHANGE UP (ref 0–1.5)
IMM GRANULOCYTES NFR BLD AUTO: 0.8 % — SIGNIFICANT CHANGE UP (ref 0–1.5)
INR BLD: 1.13 RATIO — SIGNIFICANT CHANGE UP (ref 0.88–1.16)
INR BLD: 1.23 RATIO — HIGH (ref 0.88–1.16)
IRON SATN MFR SERPL: 13 UG/DL — LOW (ref 45–165)
IRON SATN MFR SERPL: 6 % — LOW (ref 16–55)
KETONES UR-MCNC: NEGATIVE — SIGNIFICANT CHANGE UP
KETONES UR-MCNC: NEGATIVE — SIGNIFICANT CHANGE UP
LEUKOCYTE ESTERASE UR-ACNC: ABNORMAL
LEUKOCYTE ESTERASE UR-ACNC: NEGATIVE — SIGNIFICANT CHANGE UP
LYMPHOCYTES # BLD AUTO: 0.4 K/UL — LOW (ref 1–3.3)
LYMPHOCYTES # BLD AUTO: 0.52 K/UL — LOW (ref 1–3.3)
LYMPHOCYTES # BLD AUTO: 0.56 K/UL — LOW (ref 1–3.3)
LYMPHOCYTES # BLD AUTO: 0.62 K/UL — LOW (ref 1–3.3)
LYMPHOCYTES # BLD AUTO: 0.63 K/UL — LOW (ref 1–3.3)
LYMPHOCYTES # BLD AUTO: 0.64 K/UL — LOW (ref 1–3.3)
LYMPHOCYTES # BLD AUTO: 3 % — LOW (ref 13–44)
LYMPHOCYTES # BLD AUTO: 3.6 % — LOW (ref 13–44)
LYMPHOCYTES # BLD AUTO: 4.3 % — LOW (ref 13–44)
LYMPHOCYTES # BLD AUTO: 5.4 % — LOW (ref 13–44)
LYMPHOCYTES # BLD AUTO: 5.6 % — LOW (ref 13–44)
LYMPHOCYTES # BLD AUTO: 6.3 % — LOW (ref 13–44)
MAGNESIUM SERPL-MCNC: 1.5 MG/DL — LOW (ref 1.6–2.6)
MAGNESIUM SERPL-MCNC: 1.6 MG/DL — SIGNIFICANT CHANGE UP (ref 1.6–2.6)
MAGNESIUM SERPL-MCNC: 1.7 MG/DL — SIGNIFICANT CHANGE UP (ref 1.6–2.6)
MAGNESIUM SERPL-MCNC: 2.1 MG/DL — SIGNIFICANT CHANGE UP (ref 1.6–2.6)
MCHC RBC-ENTMCNC: 26.8 PG — LOW (ref 27–34)
MCHC RBC-ENTMCNC: 26.9 PG — LOW (ref 27–34)
MCHC RBC-ENTMCNC: 27.2 PG — SIGNIFICANT CHANGE UP (ref 27–34)
MCHC RBC-ENTMCNC: 27.3 PG — SIGNIFICANT CHANGE UP (ref 27–34)
MCHC RBC-ENTMCNC: 27.3 PG — SIGNIFICANT CHANGE UP (ref 27–34)
MCHC RBC-ENTMCNC: 27.4 PG — SIGNIFICANT CHANGE UP (ref 27–34)
MCHC RBC-ENTMCNC: 27.5 PG — SIGNIFICANT CHANGE UP (ref 27–34)
MCHC RBC-ENTMCNC: 27.5 PG — SIGNIFICANT CHANGE UP (ref 27–34)
MCHC RBC-ENTMCNC: 27.6 PG — SIGNIFICANT CHANGE UP (ref 27–34)
MCHC RBC-ENTMCNC: 27.8 PG — SIGNIFICANT CHANGE UP (ref 27–34)
MCHC RBC-ENTMCNC: 27.9 PG — SIGNIFICANT CHANGE UP (ref 27–34)
MCHC RBC-ENTMCNC: 28 PG — SIGNIFICANT CHANGE UP (ref 27–34)
MCHC RBC-ENTMCNC: 29.8 GM/DL — LOW (ref 32–36)
MCHC RBC-ENTMCNC: 30.7 GM/DL — LOW (ref 32–36)
MCHC RBC-ENTMCNC: 31.2 GM/DL — LOW (ref 32–36)
MCHC RBC-ENTMCNC: 31.3 GM/DL — LOW (ref 32–36)
MCHC RBC-ENTMCNC: 31.4 GM/DL — LOW (ref 32–36)
MCHC RBC-ENTMCNC: 31.5 GM/DL — LOW (ref 32–36)
MCHC RBC-ENTMCNC: 31.6 GM/DL — LOW (ref 32–36)
MCHC RBC-ENTMCNC: 31.8 GM/DL — LOW (ref 32–36)
MCHC RBC-ENTMCNC: 31.9 GM/DL — LOW (ref 32–36)
MCHC RBC-ENTMCNC: 31.9 GM/DL — LOW (ref 32–36)
MCV RBC AUTO: 85.3 FL — SIGNIFICANT CHANGE UP (ref 80–100)
MCV RBC AUTO: 85.8 FL — SIGNIFICANT CHANGE UP (ref 80–100)
MCV RBC AUTO: 87 FL — SIGNIFICANT CHANGE UP (ref 80–100)
MCV RBC AUTO: 87.1 FL — SIGNIFICANT CHANGE UP (ref 80–100)
MCV RBC AUTO: 87.2 FL — SIGNIFICANT CHANGE UP (ref 80–100)
MCV RBC AUTO: 87.3 FL — SIGNIFICANT CHANGE UP (ref 80–100)
MCV RBC AUTO: 87.5 FL — SIGNIFICANT CHANGE UP (ref 80–100)
MCV RBC AUTO: 87.5 FL — SIGNIFICANT CHANGE UP (ref 80–100)
MCV RBC AUTO: 87.9 FL — SIGNIFICANT CHANGE UP (ref 80–100)
MCV RBC AUTO: 88.1 FL — SIGNIFICANT CHANGE UP (ref 80–100)
MCV RBC AUTO: 88.2 FL — SIGNIFICANT CHANGE UP (ref 80–100)
MCV RBC AUTO: 90.3 FL — SIGNIFICANT CHANGE UP (ref 80–100)
MONOCYTES # BLD AUTO: 0.27 K/UL — SIGNIFICANT CHANGE UP (ref 0–0.9)
MONOCYTES # BLD AUTO: 1.03 K/UL — HIGH (ref 0–0.9)
MONOCYTES # BLD AUTO: 1.2 K/UL — HIGH (ref 0–0.9)
MONOCYTES # BLD AUTO: 1.23 K/UL — HIGH (ref 0–0.9)
MONOCYTES # BLD AUTO: 1.25 K/UL — HIGH (ref 0–0.9)
MONOCYTES # BLD AUTO: 1.28 K/UL — HIGH (ref 0–0.9)
MONOCYTES NFR BLD AUTO: 10.5 % — SIGNIFICANT CHANGE UP (ref 2–14)
MONOCYTES NFR BLD AUTO: 10.6 % — SIGNIFICANT CHANGE UP (ref 2–14)
MONOCYTES NFR BLD AUTO: 10.7 % — SIGNIFICANT CHANGE UP (ref 2–14)
MONOCYTES NFR BLD AUTO: 2 % — SIGNIFICANT CHANGE UP (ref 2–14)
MONOCYTES NFR BLD AUTO: 8.6 % — SIGNIFICANT CHANGE UP (ref 2–14)
MONOCYTES NFR BLD AUTO: 9.9 % — SIGNIFICANT CHANGE UP (ref 2–14)
NEUTROPHILS # BLD AUTO: 10.9 K/UL — HIGH (ref 1.8–7.4)
NEUTROPHILS # BLD AUTO: 12.45 K/UL — HIGH (ref 1.8–7.4)
NEUTROPHILS # BLD AUTO: 12.63 K/UL — HIGH (ref 1.8–7.4)
NEUTROPHILS # BLD AUTO: 8 K/UL — HIGH (ref 1.8–7.4)
NEUTROPHILS # BLD AUTO: 9.25 K/UL — HIGH (ref 1.8–7.4)
NEUTROPHILS # BLD AUTO: 9.74 K/UL — HIGH (ref 1.8–7.4)
NEUTROPHILS NFR BLD AUTO: 81.3 % — HIGH (ref 43–77)
NEUTROPHILS NFR BLD AUTO: 82.1 % — HIGH (ref 43–77)
NEUTROPHILS NFR BLD AUTO: 82.6 % — HIGH (ref 43–77)
NEUTROPHILS NFR BLD AUTO: 84.1 % — HIGH (ref 43–77)
NEUTROPHILS NFR BLD AUTO: 86.9 % — HIGH (ref 43–77)
NEUTROPHILS NFR BLD AUTO: 94 % — HIGH (ref 43–77)
NITRITE UR-MCNC: NEGATIVE — SIGNIFICANT CHANGE UP
NITRITE UR-MCNC: POSITIVE
NRBC # BLD: 0 /100 WBCS — SIGNIFICANT CHANGE UP (ref 0–0)
NRBC # BLD: SIGNIFICANT CHANGE UP /100 WBCS (ref 0–0)
OB PNL STL: POSITIVE
PH UR: 6 — SIGNIFICANT CHANGE UP (ref 5–8)
PH UR: 7 — SIGNIFICANT CHANGE UP (ref 5–8)
PHOSPHATE SERPL-MCNC: 2 MG/DL — LOW (ref 2.5–4.5)
PHOSPHATE SERPL-MCNC: 2.5 MG/DL — SIGNIFICANT CHANGE UP (ref 2.5–4.5)
PLATELET # BLD AUTO: 254 K/UL — SIGNIFICANT CHANGE UP (ref 150–400)
PLATELET # BLD AUTO: 263 K/UL — SIGNIFICANT CHANGE UP (ref 150–400)
PLATELET # BLD AUTO: 269 K/UL — SIGNIFICANT CHANGE UP (ref 150–400)
PLATELET # BLD AUTO: 271 K/UL — SIGNIFICANT CHANGE UP (ref 150–400)
PLATELET # BLD AUTO: 276 K/UL — SIGNIFICANT CHANGE UP (ref 150–400)
PLATELET # BLD AUTO: 280 K/UL — SIGNIFICANT CHANGE UP (ref 150–400)
PLATELET # BLD AUTO: 285 K/UL — SIGNIFICANT CHANGE UP (ref 150–400)
PLATELET # BLD AUTO: 289 K/UL — SIGNIFICANT CHANGE UP (ref 150–400)
PLATELET # BLD AUTO: 295 K/UL — SIGNIFICANT CHANGE UP (ref 150–400)
PLATELET # BLD AUTO: 299 K/UL — SIGNIFICANT CHANGE UP (ref 150–400)
PLATELET # BLD AUTO: 319 K/UL — SIGNIFICANT CHANGE UP (ref 150–400)
PLATELET # BLD AUTO: 334 K/UL — SIGNIFICANT CHANGE UP (ref 150–400)
POTASSIUM SERPL-MCNC: 2.7 MMOL/L — CRITICAL LOW (ref 3.5–5.3)
POTASSIUM SERPL-MCNC: 2.8 MMOL/L — CRITICAL LOW (ref 3.5–5.3)
POTASSIUM SERPL-MCNC: 3.1 MMOL/L — LOW (ref 3.5–5.3)
POTASSIUM SERPL-MCNC: 3.2 MMOL/L — LOW (ref 3.5–5.3)
POTASSIUM SERPL-MCNC: 3.3 MMOL/L — LOW (ref 3.5–5.3)
POTASSIUM SERPL-MCNC: 3.5 MMOL/L — SIGNIFICANT CHANGE UP (ref 3.5–5.3)
POTASSIUM SERPL-MCNC: 3.5 MMOL/L — SIGNIFICANT CHANGE UP (ref 3.5–5.3)
POTASSIUM SERPL-MCNC: 3.6 MMOL/L — SIGNIFICANT CHANGE UP (ref 3.5–5.3)
POTASSIUM SERPL-MCNC: 3.7 MMOL/L — SIGNIFICANT CHANGE UP (ref 3.5–5.3)
POTASSIUM SERPL-MCNC: 3.7 MMOL/L — SIGNIFICANT CHANGE UP (ref 3.5–5.3)
POTASSIUM SERPL-MCNC: 3.9 MMOL/L — SIGNIFICANT CHANGE UP (ref 3.5–5.3)
POTASSIUM SERPL-MCNC: 4.1 MMOL/L — SIGNIFICANT CHANGE UP (ref 3.5–5.3)
POTASSIUM SERPL-MCNC: 4.1 MMOL/L — SIGNIFICANT CHANGE UP (ref 3.5–5.3)
POTASSIUM SERPL-MCNC: 4.2 MMOL/L — SIGNIFICANT CHANGE UP (ref 3.5–5.3)
POTASSIUM SERPL-SCNC: 2.7 MMOL/L — CRITICAL LOW (ref 3.5–5.3)
POTASSIUM SERPL-SCNC: 2.8 MMOL/L — CRITICAL LOW (ref 3.5–5.3)
POTASSIUM SERPL-SCNC: 3.1 MMOL/L — LOW (ref 3.5–5.3)
POTASSIUM SERPL-SCNC: 3.2 MMOL/L — LOW (ref 3.5–5.3)
POTASSIUM SERPL-SCNC: 3.3 MMOL/L — LOW (ref 3.5–5.3)
POTASSIUM SERPL-SCNC: 3.5 MMOL/L — SIGNIFICANT CHANGE UP (ref 3.5–5.3)
POTASSIUM SERPL-SCNC: 3.5 MMOL/L — SIGNIFICANT CHANGE UP (ref 3.5–5.3)
POTASSIUM SERPL-SCNC: 3.6 MMOL/L — SIGNIFICANT CHANGE UP (ref 3.5–5.3)
POTASSIUM SERPL-SCNC: 3.7 MMOL/L — SIGNIFICANT CHANGE UP (ref 3.5–5.3)
POTASSIUM SERPL-SCNC: 3.7 MMOL/L — SIGNIFICANT CHANGE UP (ref 3.5–5.3)
POTASSIUM SERPL-SCNC: 3.9 MMOL/L — SIGNIFICANT CHANGE UP (ref 3.5–5.3)
POTASSIUM SERPL-SCNC: 4.1 MMOL/L — SIGNIFICANT CHANGE UP (ref 3.5–5.3)
POTASSIUM SERPL-SCNC: 4.1 MMOL/L — SIGNIFICANT CHANGE UP (ref 3.5–5.3)
POTASSIUM SERPL-SCNC: 4.2 MMOL/L — SIGNIFICANT CHANGE UP (ref 3.5–5.3)
PROCALCITONIN SERPL-MCNC: 0.55 NG/ML — HIGH (ref 0–0.04)
PROT ?TM UR-MCNC: 16 MG/DL — HIGH (ref 0–12)
PROT SERPL-MCNC: 4.7 G/DL — LOW (ref 6–8.3)
PROT SERPL-MCNC: 5 G/DL — LOW (ref 6–8.3)
PROT SERPL-MCNC: 5.3 G/DL — LOW (ref 6–8.3)
PROT SERPL-MCNC: 5.3 G/DL — LOW (ref 6–8.3)
PROT SERPL-MCNC: 6.5 G/DL — SIGNIFICANT CHANGE UP (ref 6–8.3)
PROT UR-MCNC: 15
PROT UR-MCNC: 30 MG/DL
PROT/CREAT UR-RTO: 0.4 RATIO — HIGH (ref 0–0.2)
PROTHROM AB SERPL-ACNC: 12.7 SEC — SIGNIFICANT CHANGE UP (ref 10–12.9)
PROTHROM AB SERPL-ACNC: 14.3 SEC — HIGH (ref 10.6–13.6)
PSA FLD-MCNC: <0.01 NG/ML — SIGNIFICANT CHANGE UP (ref 0–4)
RBC # BLD: 2.65 M/UL — LOW (ref 4.2–5.8)
RBC # BLD: 2.79 M/UL — LOW (ref 4.2–5.8)
RBC # BLD: 3.27 M/UL — LOW (ref 4.2–5.8)
RBC # BLD: 3.46 M/UL — LOW (ref 4.2–5.8)
RBC # BLD: 3.46 M/UL — LOW (ref 4.2–5.8)
RBC # BLD: 3.47 M/UL — LOW (ref 4.2–5.8)
RBC # BLD: 3.49 M/UL — LOW (ref 4.2–5.8)
RBC # BLD: 3.52 M/UL — LOW (ref 4.2–5.8)
RBC # BLD: 3.53 M/UL — LOW (ref 4.2–5.8)
RBC # BLD: 3.62 M/UL — LOW (ref 4.2–5.8)
RBC # BLD: 3.7 M/UL — LOW (ref 4.2–5.8)
RBC # BLD: 3.74 M/UL — LOW (ref 4.2–5.8)
RBC # FLD: 16.6 % — HIGH (ref 10.3–14.5)
RBC # FLD: 16.8 % — HIGH (ref 10.3–14.5)
RBC # FLD: 16.9 % — HIGH (ref 10.3–14.5)
RBC # FLD: 17 % — HIGH (ref 10.3–14.5)
RBC # FLD: 17.2 % — HIGH (ref 10.3–14.5)
RBC # FLD: 17.6 % — HIGH (ref 10.3–14.5)
RBC # FLD: 18 % — HIGH (ref 10.3–14.5)
RBC # FLD: 18.1 % — HIGH (ref 10.3–14.5)
RBC # FLD: 18.2 % — HIGH (ref 10.3–14.5)
RBC # FLD: 19.7 % — HIGH (ref 10.3–14.5)
RBC CASTS # UR COMP ASSIST: ABNORMAL /HPF (ref 0–4)
SARS-COV-2 RNA SPEC QL NAA+PROBE: SIGNIFICANT CHANGE UP
SODIUM SERPL-SCNC: 139 MMOL/L — SIGNIFICANT CHANGE UP (ref 135–145)
SODIUM SERPL-SCNC: 140 MMOL/L — SIGNIFICANT CHANGE UP (ref 135–145)
SODIUM SERPL-SCNC: 141 MMOL/L — SIGNIFICANT CHANGE UP (ref 135–145)
SODIUM SERPL-SCNC: 142 MMOL/L — SIGNIFICANT CHANGE UP (ref 135–145)
SODIUM SERPL-SCNC: 143 MMOL/L — SIGNIFICANT CHANGE UP (ref 135–145)
SODIUM SERPL-SCNC: 143 MMOL/L — SIGNIFICANT CHANGE UP (ref 135–145)
SODIUM SERPL-SCNC: 145 MMOL/L — SIGNIFICANT CHANGE UP (ref 135–145)
SODIUM UR-SCNC: 121 MMOL/L — SIGNIFICANT CHANGE UP
SP GR SPEC: 1 — LOW (ref 1.01–1.02)
SP GR SPEC: 1 — LOW (ref 1.01–1.02)
SPECIMEN SOURCE: SIGNIFICANT CHANGE UP
SPECIMEN SOURCE: SIGNIFICANT CHANGE UP
SURGICAL PATHOLOGY STUDY: SIGNIFICANT CHANGE UP
TIBC SERPL-MCNC: 209 UG/DL — LOW (ref 220–430)
TRANSFERRIN SERPL-MCNC: 144 MG/DL — LOW (ref 200–360)
TSH SERPL-MCNC: 5.27 UIU/ML — HIGH (ref 0.36–3.74)
UIBC SERPL-MCNC: 196 UG/DL — SIGNIFICANT CHANGE UP (ref 110–370)
UROBILINOGEN FLD QL: NEGATIVE — SIGNIFICANT CHANGE UP
UROBILINOGEN FLD QL: NEGATIVE — SIGNIFICANT CHANGE UP
UUN UR-MCNC: 444 MG/DL — SIGNIFICANT CHANGE UP
WBC # BLD: 11.26 K/UL — HIGH (ref 3.8–10.5)
WBC # BLD: 11.8 K/UL — HIGH (ref 3.8–10.5)
WBC # BLD: 11.96 K/UL — HIGH (ref 3.8–10.5)
WBC # BLD: 12.39 K/UL — HIGH (ref 3.8–10.5)
WBC # BLD: 12.91 K/UL — HIGH (ref 3.8–10.5)
WBC # BLD: 12.96 K/UL — HIGH (ref 3.8–10.5)
WBC # BLD: 13.44 K/UL — HIGH (ref 3.8–10.5)
WBC # BLD: 13.65 K/UL — HIGH (ref 3.8–10.5)
WBC # BLD: 14.28 K/UL — HIGH (ref 3.8–10.5)
WBC # BLD: 14.29 K/UL — HIGH (ref 3.8–10.5)
WBC # BLD: 14.34 K/UL — HIGH (ref 3.8–10.5)
WBC # BLD: 9.84 K/UL — SIGNIFICANT CHANGE UP (ref 3.8–10.5)
WBC # FLD AUTO: 11.26 K/UL — HIGH (ref 3.8–10.5)
WBC # FLD AUTO: 11.8 K/UL — HIGH (ref 3.8–10.5)
WBC # FLD AUTO: 11.96 K/UL — HIGH (ref 3.8–10.5)
WBC # FLD AUTO: 12.39 K/UL — HIGH (ref 3.8–10.5)
WBC # FLD AUTO: 12.91 K/UL — HIGH (ref 3.8–10.5)
WBC # FLD AUTO: 12.96 K/UL — HIGH (ref 3.8–10.5)
WBC # FLD AUTO: 13.44 K/UL — HIGH (ref 3.8–10.5)
WBC # FLD AUTO: 13.65 K/UL — HIGH (ref 3.8–10.5)
WBC # FLD AUTO: 14.28 K/UL — HIGH (ref 3.8–10.5)
WBC # FLD AUTO: 14.29 K/UL — HIGH (ref 3.8–10.5)
WBC # FLD AUTO: 14.34 K/UL — HIGH (ref 3.8–10.5)
WBC # FLD AUTO: 9.84 K/UL — SIGNIFICANT CHANGE UP (ref 3.8–10.5)
WBC UR QL: SIGNIFICANT CHANGE UP

## 2020-01-01 PROCEDURE — 99024 POSTOP FOLLOW-UP VISIT: CPT

## 2020-01-01 PROCEDURE — 99232 SBSQ HOSP IP/OBS MODERATE 35: CPT

## 2020-01-01 PROCEDURE — 43239 EGD BIOPSY SINGLE/MULTIPLE: CPT

## 2020-01-01 PROCEDURE — 99285 EMERGENCY DEPT VISIT HI MDM: CPT | Mod: 25

## 2020-01-01 PROCEDURE — 99285 EMERGENCY DEPT VISIT HI MDM: CPT

## 2020-01-01 PROCEDURE — 93005 ELECTROCARDIOGRAM TRACING: CPT

## 2020-01-01 PROCEDURE — 99214 OFFICE O/P EST MOD 30 MIN: CPT

## 2020-01-01 PROCEDURE — 88313 SPECIAL STAINS GROUP 2: CPT | Mod: 26

## 2020-01-01 PROCEDURE — 87086 URINE CULTURE/COLONY COUNT: CPT

## 2020-01-01 PROCEDURE — 99233 SBSQ HOSP IP/OBS HIGH 50: CPT | Mod: GC

## 2020-01-01 PROCEDURE — 83550 IRON BINDING TEST: CPT

## 2020-01-01 PROCEDURE — 44206 LAP PART COLECTOMY W/STOMA: CPT

## 2020-01-01 PROCEDURE — 99233 SBSQ HOSP IP/OBS HIGH 50: CPT

## 2020-01-01 PROCEDURE — 93010 ELECTROCARDIOGRAM REPORT: CPT

## 2020-01-01 PROCEDURE — 44213 LAP MOBIL SPLENIC FL ADD-ON: CPT

## 2020-01-01 PROCEDURE — 88312 SPECIAL STAINS GROUP 1: CPT

## 2020-01-01 PROCEDURE — 83540 ASSAY OF IRON: CPT

## 2020-01-01 PROCEDURE — 86850 RBC ANTIBODY SCREEN: CPT

## 2020-01-01 PROCEDURE — 99223 1ST HOSP IP/OBS HIGH 75: CPT

## 2020-01-01 PROCEDURE — 74177 CT ABD & PELVIS W/CONTRAST: CPT | Mod: 26

## 2020-01-01 PROCEDURE — 97116 GAIT TRAINING THERAPY: CPT

## 2020-01-01 PROCEDURE — 99222 1ST HOSP IP/OBS MODERATE 55: CPT

## 2020-01-01 PROCEDURE — 99231 SBSQ HOSP IP/OBS SF/LOW 25: CPT

## 2020-01-01 PROCEDURE — 84300 ASSAY OF URINE SODIUM: CPT

## 2020-01-01 PROCEDURE — P9016: CPT

## 2020-01-01 PROCEDURE — 82570 ASSAY OF URINE CREATININE: CPT

## 2020-01-01 PROCEDURE — 36415 COLL VENOUS BLD VENIPUNCTURE: CPT

## 2020-01-01 PROCEDURE — 86923 COMPATIBILITY TEST ELECTRIC: CPT

## 2020-01-01 PROCEDURE — 83735 ASSAY OF MAGNESIUM: CPT

## 2020-01-01 PROCEDURE — 88305 TISSUE EXAM BY PATHOLOGIST: CPT

## 2020-01-01 PROCEDURE — 84100 ASSAY OF PHOSPHORUS: CPT

## 2020-01-01 PROCEDURE — 86901 BLOOD TYPING SEROLOGIC RH(D): CPT

## 2020-01-01 PROCEDURE — 99213 OFFICE O/P EST LOW 20 MIN: CPT | Mod: 95

## 2020-01-01 PROCEDURE — 84443 ASSAY THYROID STIM HORMONE: CPT

## 2020-01-01 PROCEDURE — 84132 ASSAY OF SERUM POTASSIUM: CPT

## 2020-01-01 PROCEDURE — 84466 ASSAY OF TRANSFERRIN: CPT

## 2020-01-01 PROCEDURE — 82962 GLUCOSE BLOOD TEST: CPT

## 2020-01-01 PROCEDURE — 93000 ELECTROCARDIOGRAM COMPLETE: CPT

## 2020-01-01 PROCEDURE — G0103: CPT

## 2020-01-01 PROCEDURE — 99214 OFFICE O/P EST MOD 30 MIN: CPT | Mod: 95

## 2020-01-01 PROCEDURE — U0003: CPT

## 2020-01-01 PROCEDURE — 71260 CT THORAX DX C+: CPT | Mod: 26

## 2020-01-01 PROCEDURE — 87635 SARS-COV-2 COVID-19 AMP PRB: CPT

## 2020-01-01 PROCEDURE — 71045 X-RAY EXAM CHEST 1 VIEW: CPT | Mod: 26

## 2020-01-01 PROCEDURE — 97530 THERAPEUTIC ACTIVITIES: CPT

## 2020-01-01 PROCEDURE — 82378 CARCINOEMBRYONIC ANTIGEN: CPT

## 2020-01-01 PROCEDURE — 71260 CT THORAX DX C+: CPT

## 2020-01-01 PROCEDURE — 99204 OFFICE O/P NEW MOD 45 MIN: CPT

## 2020-01-01 PROCEDURE — 99233 SBSQ HOSP IP/OBS HIGH 50: CPT | Mod: 57

## 2020-01-01 PROCEDURE — 85610 PROTHROMBIN TIME: CPT

## 2020-01-01 PROCEDURE — 88313 SPECIAL STAINS GROUP 2: CPT

## 2020-01-01 PROCEDURE — 94640 AIRWAY INHALATION TREATMENT: CPT

## 2020-01-01 PROCEDURE — 82272 OCCULT BLD FECES 1-3 TESTS: CPT

## 2020-01-01 PROCEDURE — 88309 TISSUE EXAM BY PATHOLOGIST: CPT

## 2020-01-01 PROCEDURE — 84156 ASSAY OF PROTEIN URINE: CPT

## 2020-01-01 PROCEDURE — 84145 PROCALCITONIN (PCT): CPT

## 2020-01-01 PROCEDURE — 74177 CT ABD & PELVIS W/CONTRAST: CPT

## 2020-01-01 PROCEDURE — 85018 HEMOGLOBIN: CPT

## 2020-01-01 PROCEDURE — 82728 ASSAY OF FERRITIN: CPT

## 2020-01-01 PROCEDURE — C1889: CPT

## 2020-01-01 PROCEDURE — 44206 LAP PART COLECTOMY W/STOMA: CPT | Mod: 80

## 2020-01-01 PROCEDURE — 84540 ASSAY OF URINE/UREA-N: CPT

## 2020-01-01 PROCEDURE — 86900 BLOOD TYPING SEROLOGIC ABO: CPT

## 2020-01-01 PROCEDURE — 85730 THROMBOPLASTIN TIME PARTIAL: CPT

## 2020-01-01 PROCEDURE — 88305 TISSUE EXAM BY PATHOLOGIST: CPT | Mod: 26

## 2020-01-01 PROCEDURE — 81001 URINALYSIS AUTO W/SCOPE: CPT

## 2020-01-01 PROCEDURE — 45380 COLONOSCOPY AND BIOPSY: CPT

## 2020-01-01 PROCEDURE — 88312 SPECIAL STAINS GROUP 1: CPT | Mod: 26

## 2020-01-01 PROCEDURE — 85027 COMPLETE CBC AUTOMATED: CPT

## 2020-01-01 PROCEDURE — 99223 1ST HOSP IP/OBS HIGH 75: CPT | Mod: GC

## 2020-01-01 PROCEDURE — 80053 COMPREHEN METABOLIC PANEL: CPT

## 2020-01-01 PROCEDURE — 88309 TISSUE EXAM BY PATHOLOGIST: CPT | Mod: 26

## 2020-01-01 PROCEDURE — 36430 TRANSFUSION BLD/BLD COMPNT: CPT

## 2020-01-01 PROCEDURE — 99203 OFFICE O/P NEW LOW 30 MIN: CPT

## 2020-01-01 PROCEDURE — 80048 BASIC METABOLIC PNL TOTAL CA: CPT

## 2020-01-01 PROCEDURE — 85014 HEMATOCRIT: CPT

## 2020-01-01 PROCEDURE — 71045 X-RAY EXAM CHEST 1 VIEW: CPT

## 2020-01-01 RX ORDER — IBUPROFEN 200 MG
400 TABLET ORAL EVERY 6 HOURS
Refills: 0 | Status: DISCONTINUED | OUTPATIENT
Start: 2020-01-01 | End: 2020-01-01

## 2020-01-01 RX ORDER — PANTOPRAZOLE SODIUM 40 MG/1
40 TABLET, DELAYED RELEASE ORAL
Refills: 0 | Status: ACTIVE | COMMUNITY

## 2020-01-01 RX ORDER — POTASSIUM PHOSPHATE, MONOBASIC POTASSIUM PHOSPHATE, DIBASIC 236; 224 MG/ML; MG/ML
30 INJECTION, SOLUTION INTRAVENOUS ONCE
Refills: 0 | Status: COMPLETED | OUTPATIENT
Start: 2020-01-01 | End: 2020-01-01

## 2020-01-01 RX ORDER — PANTOPRAZOLE 40 MG/1
40 TABLET, DELAYED RELEASE ORAL
Qty: 180 | Refills: 1 | Status: DISCONTINUED | COMMUNITY
Start: 2019-01-01 | End: 2020-01-01

## 2020-01-01 RX ORDER — MULTIVIT-MIN/FOLIC/VIT K/LYCOP 400-300MCG
1000 TABLET ORAL
Refills: 0 | Status: ACTIVE | COMMUNITY

## 2020-01-01 RX ORDER — FINASTERIDE 5 MG/1
1 TABLET, FILM COATED ORAL
Qty: 0 | Refills: 0 | DISCHARGE

## 2020-01-01 RX ORDER — NITROFURANTOIN MACROCRYSTAL 50 MG
50 CAPSULE ORAL
Refills: 0 | Status: DISCONTINUED | OUTPATIENT
Start: 2020-01-01 | End: 2020-01-01

## 2020-01-01 RX ORDER — IBUPROFEN 200 MG
1 TABLET ORAL
Qty: 0 | Refills: 0 | DISCHARGE
Start: 2020-01-01

## 2020-01-01 RX ORDER — HYDROCHLOROTHIAZIDE 25 MG
12.5 TABLET ORAL DAILY
Refills: 0 | Status: DISCONTINUED | OUTPATIENT
Start: 2020-01-01 | End: 2020-01-01

## 2020-01-01 RX ORDER — MAGNESIUM SULFATE 500 MG/ML
2 VIAL (ML) INJECTION ONCE
Refills: 0 | Status: COMPLETED | OUTPATIENT
Start: 2020-01-01 | End: 2020-01-01

## 2020-01-01 RX ORDER — LEVOTHYROXINE SODIUM 125 MCG
1 TABLET ORAL
Qty: 0 | Refills: 0 | DISCHARGE

## 2020-01-01 RX ORDER — POTASSIUM CHLORIDE 20 MEQ
10 PACKET (EA) ORAL
Refills: 0 | Status: COMPLETED | OUTPATIENT
Start: 2020-01-01 | End: 2020-01-01

## 2020-01-01 RX ORDER — HYDROCHLOROTHIAZIDE 12.5 MG/1
12.5 TABLET ORAL DAILY
Qty: 90 | Refills: 3 | Status: DISCONTINUED | COMMUNITY
Start: 2017-05-11 | End: 2020-01-01

## 2020-01-01 RX ORDER — B-COMPLEX WITH VITAMIN C
1 CAPSULE ORAL
Refills: 0 | Status: DISCONTINUED | OUTPATIENT
Start: 2020-01-01 | End: 2020-01-01

## 2020-01-01 RX ORDER — CALCIUM CITRATE/VITAMIN D3 200MG-6.25
TABLET ORAL
Refills: 0 | Status: ACTIVE | COMMUNITY

## 2020-01-01 RX ORDER — FAMOTIDINE 10 MG/ML
20 INJECTION INTRAVENOUS ONCE
Refills: 0 | Status: COMPLETED | OUTPATIENT
Start: 2020-01-01 | End: 2020-01-01

## 2020-01-01 RX ORDER — POTASSIUM CHLORIDE 20 MEQ
40 PACKET (EA) ORAL
Refills: 0 | Status: DISCONTINUED | OUTPATIENT
Start: 2020-01-01 | End: 2020-01-01

## 2020-01-01 RX ORDER — LORATADINE 10 MG/1
10 TABLET ORAL DAILY
Refills: 0 | Status: DISCONTINUED | OUTPATIENT
Start: 2020-01-01 | End: 2020-01-01

## 2020-01-01 RX ORDER — TORSEMIDE 20 MG/1
20 TABLET ORAL
Qty: 90 | Refills: 3 | Status: ACTIVE | COMMUNITY
Start: 2020-01-01 | End: 1900-01-01

## 2020-01-01 RX ORDER — FLUTICASONE PROPIONATE 50 MCG
2 SPRAY, SUSPENSION NASAL
Refills: 0 | Status: DISCONTINUED | OUTPATIENT
Start: 2020-01-01 | End: 2020-01-01

## 2020-01-01 RX ORDER — PANTOPRAZOLE SODIUM 20 MG/1
40 TABLET, DELAYED RELEASE ORAL
Refills: 0 | Status: DISCONTINUED | OUTPATIENT
Start: 2020-01-01 | End: 2020-01-01

## 2020-01-01 RX ORDER — HYDROMORPHONE HYDROCHLORIDE 2 MG/ML
0.5 INJECTION INTRAMUSCULAR; INTRAVENOUS; SUBCUTANEOUS
Refills: 0 | Status: DISCONTINUED | OUTPATIENT
Start: 2020-01-01 | End: 2020-01-01

## 2020-01-01 RX ORDER — SOD SULF/SODIUM/NAHCO3/KCL/PEG
4000 SOLUTION, RECONSTITUTED, ORAL ORAL ONCE
Refills: 0 | Status: DISCONTINUED | OUTPATIENT
Start: 2020-01-01 | End: 2020-01-01

## 2020-01-01 RX ORDER — FENOFIBRATE,MICRONIZED 130 MG
1 CAPSULE ORAL
Qty: 0 | Refills: 0 | DISCHARGE

## 2020-01-01 RX ORDER — SODIUM SULFATE, POTASSIUM SULFATE, MAGNESIUM SULFATE 17.5; 3.13; 1.6 G/ML; G/ML; G/ML
17.5-3.13-1.6 SOLUTION, CONCENTRATE ORAL
Qty: 1 | Refills: 0 | Status: DISCONTINUED | COMMUNITY
Start: 2020-01-01 | End: 2020-01-01

## 2020-01-01 RX ORDER — SODIUM CHLORIDE 9 MG/ML
1000 INJECTION, SOLUTION INTRAVENOUS
Refills: 0 | Status: DISCONTINUED | OUTPATIENT
Start: 2020-01-01 | End: 2020-01-01

## 2020-01-01 RX ORDER — MONTELUKAST 4 MG/1
0 TABLET, CHEWABLE ORAL
Qty: 0 | Refills: 0 | DISCHARGE

## 2020-01-01 RX ORDER — METRONIDAZOLE 500 MG
500 TABLET ORAL ONCE
Refills: 0 | Status: COMPLETED | OUTPATIENT
Start: 2020-01-01 | End: 2020-01-01

## 2020-01-01 RX ORDER — FERROUS SULFATE 325(65) MG
325 TABLET ORAL DAILY
Refills: 0 | Status: DISCONTINUED | OUTPATIENT
Start: 2020-01-01 | End: 2020-01-01

## 2020-01-01 RX ORDER — PANTOPRAZOLE SODIUM 20 MG/1
1 TABLET, DELAYED RELEASE ORAL
Qty: 0 | Refills: 0 | DISCHARGE

## 2020-01-01 RX ORDER — PHENAZOPYRIDINE HCL 100 MG
100 TABLET ORAL
Refills: 0 | Status: DISCONTINUED | OUTPATIENT
Start: 2020-01-01 | End: 2020-01-01

## 2020-01-01 RX ORDER — SODIUM,POTASSIUM PHOSPHATES 278-250MG
1 POWDER IN PACKET (EA) ORAL
Refills: 0 | Status: COMPLETED | OUTPATIENT
Start: 2020-01-01 | End: 2020-01-01

## 2020-01-01 RX ORDER — PANTOPRAZOLE SODIUM 20 MG/1
20 TABLET, DELAYED RELEASE ORAL ONCE
Refills: 0 | Status: DISCONTINUED | OUTPATIENT
Start: 2020-01-01 | End: 2020-01-01

## 2020-01-01 RX ORDER — FENOFIBRATE,MICRONIZED 130 MG
145 CAPSULE ORAL DAILY
Refills: 0 | Status: DISCONTINUED | OUTPATIENT
Start: 2020-01-01 | End: 2020-01-01

## 2020-01-01 RX ORDER — FINASTERIDE 5 MG/1
5 TABLET, FILM COATED ORAL DAILY
Refills: 0 | Status: DISCONTINUED | OUTPATIENT
Start: 2020-01-01 | End: 2020-01-01

## 2020-01-01 RX ORDER — METOPROLOL TARTRATE 50 MG
1 TABLET ORAL
Qty: 0 | Refills: 0 | DISCHARGE

## 2020-01-01 RX ORDER — METOPROLOL TARTRATE 50 MG
100 TABLET ORAL DAILY
Refills: 0 | Status: DISCONTINUED | OUTPATIENT
Start: 2020-01-01 | End: 2020-01-01

## 2020-01-01 RX ORDER — CIPROFLOXACIN LACTATE 400MG/40ML
400 VIAL (ML) INTRAVENOUS EVERY 12 HOURS
Refills: 0 | Status: COMPLETED | OUTPATIENT
Start: 2020-01-01 | End: 2020-01-01

## 2020-01-01 RX ORDER — FLUTICASONE PROPIONATE 50 UG/1
50 SPRAY, METERED NASAL TWICE DAILY
Qty: 3 | Refills: 1 | Status: DISCONTINUED | COMMUNITY
Start: 2019-05-16 | End: 2020-01-01

## 2020-01-01 RX ORDER — ACETAMINOPHEN 500 MG
2 TABLET ORAL
Qty: 0 | Refills: 0 | DISCHARGE
Start: 2020-01-01

## 2020-01-01 RX ORDER — CIPROFLOXACIN LACTATE 400MG/40ML
400 VIAL (ML) INTRAVENOUS ONCE
Refills: 0 | Status: COMPLETED | OUTPATIENT
Start: 2020-01-01 | End: 2020-01-01

## 2020-01-01 RX ORDER — TAMSULOSIN HYDROCHLORIDE 0.4 MG/1
0.4 CAPSULE ORAL AT BEDTIME
Refills: 0 | Status: DISCONTINUED | OUTPATIENT
Start: 2020-01-01 | End: 2020-01-01

## 2020-01-01 RX ORDER — PANTOPRAZOLE SODIUM 20 MG/1
40 TABLET, DELAYED RELEASE ORAL ONCE
Refills: 0 | Status: COMPLETED | OUTPATIENT
Start: 2020-01-01 | End: 2020-01-01

## 2020-01-01 RX ORDER — MONTELUKAST 4 MG/1
10 TABLET, CHEWABLE ORAL AT BEDTIME
Refills: 0 | Status: DISCONTINUED | OUTPATIENT
Start: 2020-01-01 | End: 2020-01-01

## 2020-01-01 RX ORDER — SIMVASTATIN 20 MG/1
1 TABLET, FILM COATED ORAL
Qty: 0 | Refills: 0 | DISCHARGE

## 2020-01-01 RX ORDER — MUPIROCIN 20 MG/G
2 OINTMENT TOPICAL
Qty: 22 | Refills: 0 | Status: DISCONTINUED | COMMUNITY
Start: 2017-05-26 | End: 2020-01-01

## 2020-01-01 RX ORDER — MONTELUKAST 10 MG/1
10 TABLET, FILM COATED ORAL
Qty: 1 | Refills: 1 | Status: DISCONTINUED | COMMUNITY
Start: 2019-05-16 | End: 2020-01-01

## 2020-01-01 RX ORDER — GOLIMUMAB 50 MG/4ML
0 SOLUTION INTRAVENOUS
Qty: 0 | Refills: 0 | DISCHARGE

## 2020-01-01 RX ORDER — PHENAZOPYRIDINE HCL 100 MG
100 TABLET ORAL ONCE
Refills: 0 | Status: COMPLETED | OUTPATIENT
Start: 2020-01-01 | End: 2020-01-01

## 2020-01-01 RX ORDER — POTASSIUM CHLORIDE 20 MEQ
40 PACKET (EA) ORAL
Refills: 0 | Status: COMPLETED | OUTPATIENT
Start: 2020-01-01 | End: 2020-01-01

## 2020-01-01 RX ORDER — LEVOCETIRIZINE DIHYDROCHLORIDE 0.5 MG/ML
0 SOLUTION ORAL
Qty: 0 | Refills: 0 | DISCHARGE

## 2020-01-01 RX ORDER — LEVOTHYROXINE SODIUM 125 MCG
25 TABLET ORAL DAILY
Refills: 0 | Status: DISCONTINUED | OUTPATIENT
Start: 2020-01-01 | End: 2020-01-01

## 2020-01-01 RX ORDER — SIMVASTATIN 20 MG/1
40 TABLET, FILM COATED ORAL AT BEDTIME
Refills: 0 | Status: DISCONTINUED | OUTPATIENT
Start: 2020-01-01 | End: 2020-01-01

## 2020-01-01 RX ORDER — B-COMPLEX WITH VITAMIN C
1 CAPSULE ORAL
Qty: 0 | Refills: 0 | DISCHARGE
Start: 2020-01-01

## 2020-01-01 RX ORDER — MONTELUKAST SODIUM 10 MG/1
10 TABLET, FILM COATED ORAL
Qty: 1 | Refills: 3 | Status: DISCONTINUED | COMMUNITY
Start: 2018-05-15 | End: 2020-01-01

## 2020-01-01 RX ORDER — SODIUM CHLORIDE 9 MG/ML
1000 INJECTION INTRAMUSCULAR; INTRAVENOUS; SUBCUTANEOUS
Refills: 0 | Status: DISCONTINUED | OUTPATIENT
Start: 2020-01-01 | End: 2020-01-01

## 2020-01-01 RX ORDER — ONDANSETRON 8 MG/1
4 TABLET, FILM COATED ORAL ONCE
Refills: 0 | Status: DISCONTINUED | OUTPATIENT
Start: 2020-01-01 | End: 2020-01-01

## 2020-01-01 RX ORDER — SOD SULF/SODIUM/NAHCO3/KCL/PEG
1000 SOLUTION, RECONSTITUTED, ORAL ORAL ONCE
Refills: 0 | Status: COMPLETED | OUTPATIENT
Start: 2020-01-01 | End: 2020-01-01

## 2020-01-01 RX ORDER — FOLIC ACID 0.8 MG
1 TABLET ORAL
Qty: 0 | Refills: 0 | DISCHARGE

## 2020-01-01 RX ORDER — NITROFURANTOIN MACROCRYSTAL 50 MG
0 CAPSULE ORAL
Qty: 0 | Refills: 0 | DISCHARGE

## 2020-01-01 RX ORDER — ASCORBIC ACID 60 MG
1 TABLET,CHEWABLE ORAL
Qty: 0 | Refills: 0 | DISCHARGE

## 2020-01-01 RX ORDER — MONTELUKAST 10 MG/1
10 TABLET, FILM COATED ORAL
Qty: 1 | Refills: 3 | Status: DISCONTINUED | COMMUNITY
Start: 2018-11-15 | End: 2020-01-01

## 2020-01-01 RX ORDER — METOPROLOL TARTRATE 50 MG
1 TABLET ORAL
Qty: 0 | Refills: 0 | DISCHARGE
Start: 2020-01-01

## 2020-01-01 RX ORDER — FLUOCINONIDE/EMOLLIENT BASE 0.05 %
0 CREAM (GRAM) TOPICAL
Qty: 0 | Refills: 0 | DISCHARGE

## 2020-01-01 RX ORDER — MAGNESIUM SULFATE 500 MG/ML
2 VIAL (ML) INJECTION ONCE
Refills: 0 | Status: DISCONTINUED | OUTPATIENT
Start: 2020-01-01 | End: 2020-01-01

## 2020-01-01 RX ORDER — IOHEXOL 300 MG/ML
500 INJECTION, SOLUTION INTRAVENOUS
Refills: 0 | Status: COMPLETED | OUTPATIENT
Start: 2020-01-01 | End: 2020-01-01

## 2020-01-01 RX ORDER — FLUTICASONE PROPIONATE 50 MCG
2 SPRAY, SUSPENSION NASAL DAILY
Refills: 0 | Status: DISCONTINUED | OUTPATIENT
Start: 2020-01-01 | End: 2020-01-01

## 2020-01-01 RX ORDER — SOD SULF/SODIUM/NAHCO3/KCL/PEG
1000 SOLUTION, RECONSTITUTED, ORAL ORAL
Refills: 0 | Status: COMPLETED | OUTPATIENT
Start: 2020-01-01 | End: 2020-01-01

## 2020-01-01 RX ORDER — FLUOCINONIDE 0.5 MG/G
0.05 CREAM TOPICAL
Qty: 120 | Refills: 0 | Status: DISCONTINUED | COMMUNITY
Start: 2017-01-24 | End: 2020-01-01

## 2020-01-01 RX ORDER — ACETAMINOPHEN 500 MG
650 TABLET ORAL EVERY 6 HOURS
Refills: 0 | Status: DISCONTINUED | OUTPATIENT
Start: 2020-01-01 | End: 2020-01-01

## 2020-01-01 RX ORDER — PHENAZOPYRIDINE HCL 100 MG
100 TABLET ORAL EVERY 8 HOURS
Refills: 0 | Status: DISCONTINUED | OUTPATIENT
Start: 2020-01-01 | End: 2020-01-01

## 2020-01-01 RX ORDER — SENNA PLUS 8.6 MG/1
2 TABLET ORAL AT BEDTIME
Refills: 0 | Status: DISCONTINUED | OUTPATIENT
Start: 2020-01-01 | End: 2020-01-01

## 2020-01-01 RX ORDER — FENOFIBRATE,MICRONIZED 130 MG
160 CAPSULE ORAL DAILY
Refills: 0 | Status: DISCONTINUED | OUTPATIENT
Start: 2020-01-01 | End: 2020-01-01

## 2020-01-01 RX ORDER — TAMSULOSIN HYDROCHLORIDE 0.4 MG/1
1 CAPSULE ORAL
Qty: 0 | Refills: 0 | DISCHARGE

## 2020-01-01 RX ORDER — METRONIDAZOLE 500 MG
500 TABLET ORAL EVERY 8 HOURS
Refills: 0 | Status: COMPLETED | OUTPATIENT
Start: 2020-01-01 | End: 2020-01-01

## 2020-01-01 RX ORDER — IOHEXOL 300 MG/ML
30 INJECTION, SOLUTION INTRAVENOUS ONCE
Refills: 0 | Status: DISCONTINUED | OUTPATIENT
Start: 2020-01-01 | End: 2020-01-01

## 2020-01-01 RX ORDER — POTASSIUM CHLORIDE 20 MEQ
40 PACKET (EA) ORAL ONCE
Refills: 0 | Status: COMPLETED | OUTPATIENT
Start: 2020-01-01 | End: 2020-01-01

## 2020-01-01 RX ORDER — ENOXAPARIN SODIUM 100 MG/ML
40 INJECTION SUBCUTANEOUS DAILY
Refills: 0 | Status: DISCONTINUED | OUTPATIENT
Start: 2020-01-01 | End: 2020-01-01

## 2020-01-01 RX ORDER — NITROFURANTOIN MACROCRYSTAL 50 MG
1 CAPSULE ORAL
Qty: 0 | Refills: 0 | DISCHARGE

## 2020-01-01 RX ORDER — FLUTICASONE PROPIONATE 50 MCG
2 SPRAY, SUSPENSION NASAL
Qty: 0 | Refills: 0 | DISCHARGE

## 2020-01-01 RX ADMIN — Medication 40 MILLIEQUIVALENT(S): at 10:31

## 2020-01-01 RX ADMIN — PANTOPRAZOLE SODIUM 40 MILLIGRAM(S): 20 TABLET, DELAYED RELEASE ORAL at 06:09

## 2020-01-01 RX ADMIN — LORATADINE 10 MILLIGRAM(S): 10 TABLET ORAL at 12:50

## 2020-01-01 RX ADMIN — SIMVASTATIN 40 MILLIGRAM(S): 20 TABLET, FILM COATED ORAL at 21:03

## 2020-01-01 RX ADMIN — Medication 100 MILLIGRAM(S): at 05:11

## 2020-01-01 RX ADMIN — Medication 1 MILLIGRAM(S): at 05:11

## 2020-01-01 RX ADMIN — Medication 100 MILLIGRAM(S): at 05:03

## 2020-01-01 RX ADMIN — PANTOPRAZOLE SODIUM 40 MILLIGRAM(S): 20 TABLET, DELAYED RELEASE ORAL at 18:37

## 2020-01-01 RX ADMIN — FINASTERIDE 5 MILLIGRAM(S): 5 TABLET, FILM COATED ORAL at 11:42

## 2020-01-01 RX ADMIN — Medication 100 MILLIGRAM(S): at 21:40

## 2020-01-01 RX ADMIN — IOHEXOL 500 MILLILITER(S): 300 INJECTION, SOLUTION INTRAVENOUS at 14:40

## 2020-01-01 RX ADMIN — Medication 50 GRAM(S): at 08:49

## 2020-01-01 RX ADMIN — Medication 1 MILLIGRAM(S): at 18:36

## 2020-01-01 RX ADMIN — Medication 25 MICROGRAM(S): at 05:16

## 2020-01-01 RX ADMIN — Medication 100 MILLIEQUIVALENT(S): at 09:56

## 2020-01-01 RX ADMIN — Medication 2 SPRAY(S): at 08:18

## 2020-01-01 RX ADMIN — Medication 1 MILLIGRAM(S): at 05:47

## 2020-01-01 RX ADMIN — LORATADINE 10 MILLIGRAM(S): 10 TABLET ORAL at 11:23

## 2020-01-01 RX ADMIN — SIMVASTATIN 40 MILLIGRAM(S): 20 TABLET, FILM COATED ORAL at 21:40

## 2020-01-01 RX ADMIN — Medication 100 MILLIGRAM(S): at 05:17

## 2020-01-01 RX ADMIN — Medication 12.5 MILLIGRAM(S): at 05:42

## 2020-01-01 RX ADMIN — SIMVASTATIN 40 MILLIGRAM(S): 20 TABLET, FILM COATED ORAL at 20:38

## 2020-01-01 RX ADMIN — Medication 1 MILLIGRAM(S): at 12:13

## 2020-01-01 RX ADMIN — Medication 325 MILLIGRAM(S): at 12:50

## 2020-01-01 RX ADMIN — MONTELUKAST 10 MILLIGRAM(S): 4 TABLET, CHEWABLE ORAL at 22:10

## 2020-01-01 RX ADMIN — Medication 2 SPRAY(S): at 08:28

## 2020-01-01 RX ADMIN — LORATADINE 10 MILLIGRAM(S): 10 TABLET ORAL at 11:53

## 2020-01-01 RX ADMIN — MONTELUKAST 10 MILLIGRAM(S): 4 TABLET, CHEWABLE ORAL at 21:29

## 2020-01-01 RX ADMIN — TAMSULOSIN HYDROCHLORIDE 0.4 MILLIGRAM(S): 0.4 CAPSULE ORAL at 21:24

## 2020-01-01 RX ADMIN — FINASTERIDE 5 MILLIGRAM(S): 5 TABLET, FILM COATED ORAL at 12:29

## 2020-01-01 RX ADMIN — Medication 1000 MILLILITER(S): at 20:21

## 2020-01-01 RX ADMIN — PANTOPRAZOLE SODIUM 40 MILLIGRAM(S): 20 TABLET, DELAYED RELEASE ORAL at 05:30

## 2020-01-01 RX ADMIN — Medication 1 MILLIGRAM(S): at 12:29

## 2020-01-01 RX ADMIN — MONTELUKAST 10 MILLIGRAM(S): 4 TABLET, CHEWABLE ORAL at 20:38

## 2020-01-01 RX ADMIN — Medication 325 MILLIGRAM(S): at 13:23

## 2020-01-01 RX ADMIN — Medication 1 TABLET(S): at 14:01

## 2020-01-01 RX ADMIN — Medication 1000 MILLILITER(S): at 21:28

## 2020-01-01 RX ADMIN — Medication 1 MILLIGRAM(S): at 05:30

## 2020-01-01 RX ADMIN — Medication 25 MICROGRAM(S): at 05:11

## 2020-01-01 RX ADMIN — Medication 2 SPRAY(S): at 08:20

## 2020-01-01 RX ADMIN — TAMSULOSIN HYDROCHLORIDE 0.4 MILLIGRAM(S): 0.4 CAPSULE ORAL at 21:29

## 2020-01-01 RX ADMIN — Medication 1 MILLIGRAM(S): at 23:33

## 2020-01-01 RX ADMIN — Medication 1 MILLIGRAM(S): at 00:13

## 2020-01-01 RX ADMIN — Medication 1 MILLIGRAM(S): at 23:07

## 2020-01-01 RX ADMIN — PANTOPRAZOLE SODIUM 40 MILLIGRAM(S): 20 TABLET, DELAYED RELEASE ORAL at 05:40

## 2020-01-01 RX ADMIN — Medication 1 MILLIGRAM(S): at 05:21

## 2020-01-01 RX ADMIN — Medication 25 MICROGRAM(S): at 05:42

## 2020-01-01 RX ADMIN — PANTOPRAZOLE SODIUM 40 MILLIGRAM(S): 20 TABLET, DELAYED RELEASE ORAL at 05:26

## 2020-01-01 RX ADMIN — Medication 1 MILLIGRAM(S): at 05:25

## 2020-01-01 RX ADMIN — Medication 1 MILLIGRAM(S): at 05:40

## 2020-01-01 RX ADMIN — Medication 325 MILLIGRAM(S): at 18:40

## 2020-01-01 RX ADMIN — Medication 1 MILLIGRAM(S): at 11:23

## 2020-01-01 RX ADMIN — Medication 2 SPRAY(S): at 10:31

## 2020-01-01 RX ADMIN — Medication 100 MILLIGRAM(S): at 21:02

## 2020-01-01 RX ADMIN — Medication 1 MILLIGRAM(S): at 14:19

## 2020-01-01 RX ADMIN — Medication 325 MILLIGRAM(S): at 14:01

## 2020-01-01 RX ADMIN — Medication 325 MILLIGRAM(S): at 11:53

## 2020-01-01 RX ADMIN — Medication 1 MILLIGRAM(S): at 00:27

## 2020-01-01 RX ADMIN — TAMSULOSIN HYDROCHLORIDE 0.4 MILLIGRAM(S): 0.4 CAPSULE ORAL at 01:07

## 2020-01-01 RX ADMIN — Medication 40 MILLIEQUIVALENT(S): at 19:04

## 2020-01-01 RX ADMIN — Medication 325 MILLIGRAM(S): at 11:23

## 2020-01-01 RX ADMIN — Medication 2 SPRAY(S): at 09:44

## 2020-01-01 RX ADMIN — SIMVASTATIN 40 MILLIGRAM(S): 20 TABLET, FILM COATED ORAL at 22:10

## 2020-01-01 RX ADMIN — Medication 1 MILLIGRAM(S): at 17:45

## 2020-01-01 RX ADMIN — PANTOPRAZOLE SODIUM 40 MILLIGRAM(S): 20 TABLET, DELAYED RELEASE ORAL at 05:46

## 2020-01-01 RX ADMIN — Medication 100 MILLIGRAM(S): at 06:07

## 2020-01-01 RX ADMIN — Medication 100 MILLIGRAM(S): at 05:23

## 2020-01-01 RX ADMIN — FINASTERIDE 5 MILLIGRAM(S): 5 TABLET, FILM COATED ORAL at 12:50

## 2020-01-01 RX ADMIN — PANTOPRAZOLE SODIUM 40 MILLIGRAM(S): 20 TABLET, DELAYED RELEASE ORAL at 05:03

## 2020-01-01 RX ADMIN — FINASTERIDE 5 MILLIGRAM(S): 5 TABLET, FILM COATED ORAL at 11:55

## 2020-01-01 RX ADMIN — Medication 1 MILLIGRAM(S): at 17:17

## 2020-01-01 RX ADMIN — Medication 40 MILLIEQUIVALENT(S): at 12:28

## 2020-01-01 RX ADMIN — Medication 2 SPRAY(S): at 09:06

## 2020-01-01 RX ADMIN — Medication 1 MILLIGRAM(S): at 11:11

## 2020-01-01 RX ADMIN — LORATADINE 10 MILLIGRAM(S): 10 TABLET ORAL at 11:54

## 2020-01-01 RX ADMIN — Medication 1 MILLIGRAM(S): at 05:03

## 2020-01-01 RX ADMIN — PANTOPRAZOLE SODIUM 40 MILLIGRAM(S): 20 TABLET, DELAYED RELEASE ORAL at 17:17

## 2020-01-01 RX ADMIN — Medication 25 MICROGRAM(S): at 05:46

## 2020-01-01 RX ADMIN — ENOXAPARIN SODIUM 40 MILLIGRAM(S): 100 INJECTION SUBCUTANEOUS at 13:23

## 2020-01-01 RX ADMIN — MONTELUKAST 10 MILLIGRAM(S): 4 TABLET, CHEWABLE ORAL at 21:40

## 2020-01-01 RX ADMIN — PANTOPRAZOLE SODIUM 40 MILLIGRAM(S): 20 TABLET, DELAYED RELEASE ORAL at 17:45

## 2020-01-01 RX ADMIN — LORATADINE 10 MILLIGRAM(S): 10 TABLET ORAL at 18:40

## 2020-01-01 RX ADMIN — Medication 25 MICROGRAM(S): at 05:03

## 2020-01-01 RX ADMIN — Medication 325 MILLIGRAM(S): at 11:42

## 2020-01-01 RX ADMIN — PANTOPRAZOLE SODIUM 40 MILLIGRAM(S): 20 TABLET, DELAYED RELEASE ORAL at 05:24

## 2020-01-01 RX ADMIN — MONTELUKAST 10 MILLIGRAM(S): 4 TABLET, CHEWABLE ORAL at 20:48

## 2020-01-01 RX ADMIN — Medication 1 TABLET(S): at 18:36

## 2020-01-01 RX ADMIN — LORATADINE 10 MILLIGRAM(S): 10 TABLET ORAL at 11:10

## 2020-01-01 RX ADMIN — Medication 1 MILLIGRAM(S): at 19:04

## 2020-01-01 RX ADMIN — Medication 100 MILLIGRAM(S): at 05:19

## 2020-01-01 RX ADMIN — Medication 100 MILLIGRAM(S): at 05:40

## 2020-01-01 RX ADMIN — TAMSULOSIN HYDROCHLORIDE 0.4 MILLIGRAM(S): 0.4 CAPSULE ORAL at 22:29

## 2020-01-01 RX ADMIN — Medication 25 MICROGRAM(S): at 05:21

## 2020-01-01 RX ADMIN — PANTOPRAZOLE SODIUM 40 MILLIGRAM(S): 20 TABLET, DELAYED RELEASE ORAL at 05:11

## 2020-01-01 RX ADMIN — TAMSULOSIN HYDROCHLORIDE 0.4 MILLIGRAM(S): 0.4 CAPSULE ORAL at 22:10

## 2020-01-01 RX ADMIN — PANTOPRAZOLE SODIUM 40 MILLIGRAM(S): 20 TABLET, DELAYED RELEASE ORAL at 05:21

## 2020-01-01 RX ADMIN — Medication 100 MILLIGRAM(S): at 05:47

## 2020-01-01 RX ADMIN — ENOXAPARIN SODIUM 40 MILLIGRAM(S): 100 INJECTION SUBCUTANEOUS at 12:30

## 2020-01-01 RX ADMIN — Medication 100 MILLIEQUIVALENT(S): at 11:16

## 2020-01-01 RX ADMIN — Medication 325 MILLIGRAM(S): at 11:10

## 2020-01-01 RX ADMIN — Medication 12.5 MILLIGRAM(S): at 05:03

## 2020-01-01 RX ADMIN — Medication 325 MILLIGRAM(S): at 11:55

## 2020-01-01 RX ADMIN — Medication 12.5 MILLIGRAM(S): at 06:07

## 2020-01-01 RX ADMIN — TAMSULOSIN HYDROCHLORIDE 0.4 MILLIGRAM(S): 0.4 CAPSULE ORAL at 21:40

## 2020-01-01 RX ADMIN — SIMVASTATIN 40 MILLIGRAM(S): 20 TABLET, FILM COATED ORAL at 21:28

## 2020-01-01 RX ADMIN — SIMVASTATIN 40 MILLIGRAM(S): 20 TABLET, FILM COATED ORAL at 21:24

## 2020-01-01 RX ADMIN — TAMSULOSIN HYDROCHLORIDE 0.4 MILLIGRAM(S): 0.4 CAPSULE ORAL at 21:45

## 2020-01-01 RX ADMIN — Medication 40 MILLIEQUIVALENT(S): at 13:27

## 2020-01-01 RX ADMIN — Medication 25 MICROGRAM(S): at 05:25

## 2020-01-01 RX ADMIN — Medication 100 MILLIGRAM(S): at 21:29

## 2020-01-01 RX ADMIN — Medication 1 MILLIGRAM(S): at 00:06

## 2020-01-01 RX ADMIN — PANTOPRAZOLE SODIUM 40 MILLIGRAM(S): 20 TABLET, DELAYED RELEASE ORAL at 17:05

## 2020-01-01 RX ADMIN — Medication 1 TABLET(S): at 09:08

## 2020-01-01 RX ADMIN — Medication 1 MILLIGRAM(S): at 05:24

## 2020-01-01 RX ADMIN — MONTELUKAST 10 MILLIGRAM(S): 4 TABLET, CHEWABLE ORAL at 21:28

## 2020-01-01 RX ADMIN — Medication 12.5 MILLIGRAM(S): at 05:29

## 2020-01-01 RX ADMIN — Medication 100 MILLIEQUIVALENT(S): at 10:12

## 2020-01-01 RX ADMIN — TAMSULOSIN HYDROCHLORIDE 0.4 MILLIGRAM(S): 0.4 CAPSULE ORAL at 20:48

## 2020-01-01 RX ADMIN — SIMVASTATIN 40 MILLIGRAM(S): 20 TABLET, FILM COATED ORAL at 22:28

## 2020-01-01 RX ADMIN — Medication 1 MILLIGRAM(S): at 12:07

## 2020-01-01 RX ADMIN — Medication 100 MILLIGRAM(S): at 21:28

## 2020-01-01 RX ADMIN — MONTELUKAST 10 MILLIGRAM(S): 4 TABLET, CHEWABLE ORAL at 21:45

## 2020-01-01 RX ADMIN — Medication 100 MILLIGRAM(S): at 05:30

## 2020-01-01 RX ADMIN — PANTOPRAZOLE SODIUM 40 MILLIGRAM(S): 20 TABLET, DELAYED RELEASE ORAL at 18:39

## 2020-01-01 RX ADMIN — Medication 1 MILLIGRAM(S): at 00:30

## 2020-01-01 RX ADMIN — Medication 1 MILLIGRAM(S): at 23:31

## 2020-01-01 RX ADMIN — Medication 325 MILLIGRAM(S): at 12:08

## 2020-01-01 RX ADMIN — Medication 1 MILLIGRAM(S): at 11:55

## 2020-01-01 RX ADMIN — TAMSULOSIN HYDROCHLORIDE 0.4 MILLIGRAM(S): 0.4 CAPSULE ORAL at 21:28

## 2020-01-01 RX ADMIN — PANTOPRAZOLE SODIUM 40 MILLIGRAM(S): 20 TABLET, DELAYED RELEASE ORAL at 17:37

## 2020-01-01 RX ADMIN — ENOXAPARIN SODIUM 40 MILLIGRAM(S): 100 INJECTION SUBCUTANEOUS at 11:22

## 2020-01-01 RX ADMIN — MONTELUKAST 10 MILLIGRAM(S): 4 TABLET, CHEWABLE ORAL at 21:03

## 2020-01-01 RX ADMIN — Medication 2 SPRAY(S): at 09:01

## 2020-01-01 RX ADMIN — FINASTERIDE 5 MILLIGRAM(S): 5 TABLET, FILM COATED ORAL at 18:40

## 2020-01-01 RX ADMIN — Medication 100 MILLIGRAM(S): at 05:21

## 2020-01-01 RX ADMIN — Medication 12.5 MILLIGRAM(S): at 05:11

## 2020-01-01 RX ADMIN — Medication 40 MILLIEQUIVALENT(S): at 06:07

## 2020-01-01 RX ADMIN — FINASTERIDE 5 MILLIGRAM(S): 5 TABLET, FILM COATED ORAL at 11:10

## 2020-01-01 RX ADMIN — Medication 100 MILLIGRAM(S): at 13:27

## 2020-01-01 RX ADMIN — SIMVASTATIN 40 MILLIGRAM(S): 20 TABLET, FILM COATED ORAL at 20:48

## 2020-01-01 RX ADMIN — Medication 12.5 MILLIGRAM(S): at 05:25

## 2020-01-01 RX ADMIN — SENNA PLUS 2 TABLET(S): 8.6 TABLET ORAL at 21:03

## 2020-01-01 RX ADMIN — Medication 1 MILLIGRAM(S): at 21:28

## 2020-01-01 RX ADMIN — SODIUM CHLORIDE 50 MILLILITER(S): 9 INJECTION, SOLUTION INTRAVENOUS at 17:43

## 2020-01-01 RX ADMIN — SODIUM CHLORIDE 50 MILLILITER(S): 9 INJECTION, SOLUTION INTRAVENOUS at 18:24

## 2020-01-01 RX ADMIN — Medication 1 MILLIGRAM(S): at 17:06

## 2020-01-01 RX ADMIN — Medication 1 MILLIGRAM(S): at 00:59

## 2020-01-01 RX ADMIN — FINASTERIDE 5 MILLIGRAM(S): 5 TABLET, FILM COATED ORAL at 11:23

## 2020-01-01 RX ADMIN — Medication 100 MILLIGRAM(S): at 13:20

## 2020-01-01 RX ADMIN — SENNA PLUS 2 TABLET(S): 8.6 TABLET ORAL at 20:38

## 2020-01-01 RX ADMIN — SODIUM CHLORIDE 100 MILLILITER(S): 9 INJECTION, SOLUTION INTRAVENOUS at 11:05

## 2020-01-01 RX ADMIN — Medication 2 SPRAY(S): at 09:56

## 2020-01-01 RX ADMIN — Medication 12.5 MILLIGRAM(S): at 05:21

## 2020-01-01 RX ADMIN — Medication 1 MILLIGRAM(S): at 21:27

## 2020-01-01 RX ADMIN — Medication 100 MILLIGRAM(S): at 14:19

## 2020-01-01 RX ADMIN — LORATADINE 10 MILLIGRAM(S): 10 TABLET ORAL at 14:01

## 2020-01-01 RX ADMIN — Medication 325 MILLIGRAM(S): at 12:29

## 2020-01-01 RX ADMIN — Medication 100 MILLIGRAM(S): at 20:37

## 2020-01-01 RX ADMIN — TAMSULOSIN HYDROCHLORIDE 0.4 MILLIGRAM(S): 0.4 CAPSULE ORAL at 20:38

## 2020-01-01 RX ADMIN — FINASTERIDE 5 MILLIGRAM(S): 5 TABLET, FILM COATED ORAL at 12:07

## 2020-01-01 RX ADMIN — SODIUM CHLORIDE 85 MILLILITER(S): 9 INJECTION, SOLUTION INTRAVENOUS at 16:20

## 2020-01-01 RX ADMIN — Medication 1 MILLIGRAM(S): at 12:50

## 2020-01-01 RX ADMIN — MONTELUKAST 10 MILLIGRAM(S): 4 TABLET, CHEWABLE ORAL at 21:24

## 2020-01-01 RX ADMIN — Medication 650 MILLIGRAM(S): at 18:29

## 2020-01-01 RX ADMIN — Medication 200 MILLIGRAM(S): at 23:32

## 2020-01-01 RX ADMIN — Medication 12.5 MILLIGRAM(S): at 05:19

## 2020-01-01 RX ADMIN — PANTOPRAZOLE SODIUM 40 MILLIGRAM(S): 20 TABLET, DELAYED RELEASE ORAL at 05:19

## 2020-01-01 RX ADMIN — Medication 1 MILLIGRAM(S): at 05:16

## 2020-01-01 RX ADMIN — Medication 1 MILLIGRAM(S): at 17:41

## 2020-01-01 RX ADMIN — Medication 100 MILLIGRAM(S): at 05:16

## 2020-01-01 RX ADMIN — Medication 100 MILLIEQUIVALENT(S): at 09:10

## 2020-01-01 RX ADMIN — Medication 100 MILLIGRAM(S): at 04:37

## 2020-01-01 RX ADMIN — Medication 25 MICROGRAM(S): at 06:07

## 2020-01-01 RX ADMIN — SIMVASTATIN 40 MILLIGRAM(S): 20 TABLET, FILM COATED ORAL at 21:45

## 2020-01-01 RX ADMIN — Medication 25 MICROGRAM(S): at 05:19

## 2020-01-01 RX ADMIN — Medication 2 SPRAY(S): at 08:24

## 2020-01-01 RX ADMIN — Medication 1 MILLIGRAM(S): at 05:19

## 2020-01-01 RX ADMIN — Medication 1000 MILLILITER(S): at 17:42

## 2020-01-01 RX ADMIN — SIMVASTATIN 40 MILLIGRAM(S): 20 TABLET, FILM COATED ORAL at 21:27

## 2020-01-01 RX ADMIN — Medication 2 SPRAY(S): at 05:21

## 2020-01-01 RX ADMIN — Medication 1 MILLIGRAM(S): at 18:54

## 2020-01-01 RX ADMIN — PANTOPRAZOLE SODIUM 40 MILLIGRAM(S): 20 TABLET, DELAYED RELEASE ORAL at 05:17

## 2020-01-01 RX ADMIN — Medication 2 SPRAY(S): at 08:15

## 2020-01-01 RX ADMIN — FINASTERIDE 5 MILLIGRAM(S): 5 TABLET, FILM COATED ORAL at 13:23

## 2020-01-01 RX ADMIN — SENNA PLUS 2 TABLET(S): 8.6 TABLET ORAL at 21:24

## 2020-01-01 RX ADMIN — FINASTERIDE 5 MILLIGRAM(S): 5 TABLET, FILM COATED ORAL at 14:01

## 2020-01-01 RX ADMIN — Medication 2 SPRAY(S): at 12:50

## 2020-01-01 RX ADMIN — Medication 1 MILLIGRAM(S): at 18:24

## 2020-01-01 RX ADMIN — PANTOPRAZOLE SODIUM 40 MILLIGRAM(S): 20 TABLET, DELAYED RELEASE ORAL at 05:16

## 2020-01-01 RX ADMIN — IOHEXOL 500 MILLILITER(S): 300 INJECTION, SOLUTION INTRAVENOUS at 16:01

## 2020-01-01 RX ADMIN — LORATADINE 10 MILLIGRAM(S): 10 TABLET ORAL at 12:07

## 2020-01-01 RX ADMIN — Medication 1 MILLIGRAM(S): at 06:07

## 2020-01-01 RX ADMIN — SODIUM CHLORIDE 50 MILLILITER(S): 9 INJECTION, SOLUTION INTRAVENOUS at 00:06

## 2020-01-01 RX ADMIN — MONTELUKAST 10 MILLIGRAM(S): 4 TABLET, CHEWABLE ORAL at 22:28

## 2020-01-01 RX ADMIN — SENNA PLUS 2 TABLET(S): 8.6 TABLET ORAL at 22:10

## 2020-01-01 RX ADMIN — Medication 1 MILLIGRAM(S): at 11:42

## 2020-01-01 RX ADMIN — TAMSULOSIN HYDROCHLORIDE 0.4 MILLIGRAM(S): 0.4 CAPSULE ORAL at 21:27

## 2020-01-01 RX ADMIN — Medication 650 MILLIGRAM(S): at 10:06

## 2020-01-01 RX ADMIN — LORATADINE 10 MILLIGRAM(S): 10 TABLET ORAL at 12:14

## 2020-01-01 RX ADMIN — MONTELUKAST 10 MILLIGRAM(S): 4 TABLET, CHEWABLE ORAL at 21:27

## 2020-01-01 RX ADMIN — Medication 100 MILLIGRAM(S): at 05:25

## 2020-01-01 RX ADMIN — Medication 100 MILLIEQUIVALENT(S): at 08:14

## 2020-01-01 RX ADMIN — Medication 1 MILLIGRAM(S): at 01:08

## 2020-01-01 RX ADMIN — LORATADINE 10 MILLIGRAM(S): 10 TABLET ORAL at 11:42

## 2020-01-01 RX ADMIN — Medication 25 MICROGRAM(S): at 05:30

## 2020-01-01 RX ADMIN — Medication 100 MILLIGRAM(S): at 21:27

## 2020-01-01 RX ADMIN — Medication 100 MILLIGRAM(S): at 22:10

## 2020-01-01 RX ADMIN — ENOXAPARIN SODIUM 40 MILLIGRAM(S): 100 INJECTION SUBCUTANEOUS at 12:49

## 2020-01-01 RX ADMIN — SIMVASTATIN 40 MILLIGRAM(S): 20 TABLET, FILM COATED ORAL at 01:07

## 2020-01-01 RX ADMIN — Medication 1 MILLIGRAM(S): at 11:54

## 2020-01-01 RX ADMIN — Medication 40 MILLIEQUIVALENT(S): at 18:23

## 2020-01-01 RX ADMIN — ENOXAPARIN SODIUM 40 MILLIGRAM(S): 100 INJECTION SUBCUTANEOUS at 12:07

## 2020-01-01 RX ADMIN — SIMVASTATIN 40 MILLIGRAM(S): 20 TABLET, FILM COATED ORAL at 21:29

## 2020-01-01 RX ADMIN — Medication 50 GRAM(S): at 18:24

## 2020-01-01 RX ADMIN — Medication 1 MILLIGRAM(S): at 17:10

## 2020-01-01 RX ADMIN — LORATADINE 10 MILLIGRAM(S): 10 TABLET ORAL at 12:29

## 2020-01-01 RX ADMIN — Medication 1 MILLIGRAM(S): at 14:01

## 2020-01-01 RX ADMIN — Medication 40 MILLIEQUIVALENT(S): at 18:36

## 2020-01-01 RX ADMIN — Medication 25 MICROGRAM(S): at 05:17

## 2020-01-01 RX ADMIN — Medication 1 MILLIGRAM(S): at 05:17

## 2020-01-01 RX ADMIN — POTASSIUM PHOSPHATE, MONOBASIC POTASSIUM PHOSPHATE, DIBASIC 83.33 MILLIMOLE(S): 236; 224 INJECTION, SOLUTION INTRAVENOUS at 09:05

## 2020-01-01 RX ADMIN — FINASTERIDE 5 MILLIGRAM(S): 5 TABLET, FILM COATED ORAL at 11:53

## 2020-01-01 RX ADMIN — Medication 100 MILLIEQUIVALENT(S): at 12:29

## 2020-01-01 RX ADMIN — SODIUM CHLORIDE 50 MILLILITER(S): 9 INJECTION, SOLUTION INTRAVENOUS at 23:33

## 2020-01-01 RX ADMIN — Medication 1 TABLET(S): at 20:48

## 2020-01-01 RX ADMIN — SENNA PLUS 2 TABLET(S): 8.6 TABLET ORAL at 22:28

## 2020-01-01 RX ADMIN — Medication 1 MILLIGRAM(S): at 18:39

## 2020-01-01 RX ADMIN — Medication 1 MILLIGRAM(S): at 18:40

## 2020-01-01 RX ADMIN — TAMSULOSIN HYDROCHLORIDE 0.4 MILLIGRAM(S): 0.4 CAPSULE ORAL at 21:02

## 2020-01-01 RX ADMIN — Medication 1 MILLIGRAM(S): at 17:29

## 2020-01-09 PROBLEM — E78.2 HYPERLIPEMIA, MIXED: Status: ACTIVE | Noted: 2020-01-01

## 2020-01-09 PROBLEM — M06.9 RHEUMATOID ARTHRITIS: Status: ACTIVE | Noted: 2020-01-01

## 2020-01-09 NOTE — PHYSICAL EXAM
[No Acute Distress] : no acute distress [Well Nourished] : well nourished [Well Developed] : well developed [Well-Appearing] : well-appearing [Normal Sclera/Conjunctiva] : normal sclera/conjunctiva [PERRL] : pupils equal round and reactive to light [EOMI] : extraocular movements intact [Normal Outer Ear/Nose] : the outer ears and nose were normal in appearance [No JVD] : no jugular venous distention [Normal Oropharynx] : the oropharynx was normal [Thyroid Normal, No Nodules] : the thyroid was normal and there were no nodules present [No Lymphadenopathy] : no lymphadenopathy [Supple] : supple [No Respiratory Distress] : no respiratory distress  [No Accessory Muscle Use] : no accessory muscle use [Clear to Auscultation] : lungs were clear to auscultation bilaterally [Regular Rhythm] : with a regular rhythm [Normal Rate] : normal rate  [No Carotid Bruits] : no carotid bruits [No Murmur] : no murmur heard [Normal S1, S2] : normal S1 and S2 [No Abdominal Bruit] : a ~M bruit was not heard ~T in the abdomen [No Varicosities] : no varicosities [Pedal Pulses Present] : the pedal pulses are present [No Edema] : there was no peripheral edema [No Extremity Clubbing/Cyanosis] : no extremity clubbing/cyanosis [No Palpable Aorta] : no palpable aorta [Soft] : abdomen soft [Non Tender] : non-tender [Non-distended] : non-distended [No Masses] : no abdominal mass palpated [No HSM] : no HSM [Normal Bowel Sounds] : normal bowel sounds [Normal Posterior Cervical Nodes] : no posterior cervical lymphadenopathy [Normal Anterior Cervical Nodes] : no anterior cervical lymphadenopathy [No CVA Tenderness] : no CVA  tenderness [No Spinal Tenderness] : no spinal tenderness [No Joint Swelling] : no joint swelling [Grossly Normal Strength/Tone] : grossly normal strength/tone [No Rash] : no rash [Coordination Grossly Intact] : coordination grossly intact [Deep Tendon Reflexes (DTR)] : deep tendon reflexes were 2+ and symmetric [Normal Gait] : normal gait [No Focal Deficits] : no focal deficits [Normal Affect] : the affect was normal [Normal Insight/Judgement] : insight and judgment were intact

## 2020-01-09 NOTE — PLAN
[FreeTextEntry1] : See GI regarding intermittent diarrhea\par Obtain previous medical records including fasting labwork\par Follow up with Cardiology, Pulmonary, Rheumatology, dermatology

## 2020-01-09 NOTE — HISTORY OF PRESENT ILLNESS
[FreeTextEntry8] : Pt is here to establish himself as a patient. \par Pt is being treated for anemia by Dr. Leonardo, Heme-Onc.\par Pt sees Cardiology, Dr. Sung and Pulmonary, Dr. Narayan.\par Pt sees Rheumatology from RA and Psoriatic Arthritis.\par Pt c/o intermittent diarrhea.

## 2020-02-12 PROBLEM — A04.72 C. DIFFICILE DIARRHEA: Status: ACTIVE | Noted: 2020-01-01

## 2020-02-12 PROBLEM — C61 PROSTATE CANCER: Status: ACTIVE | Noted: 2020-01-01

## 2020-02-12 PROBLEM — Z85.820 HISTORY OF MELANOMA: Status: ACTIVE | Noted: 2020-01-01

## 2020-02-12 NOTE — HISTORY OF PRESENT ILLNESS
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, retired dentist\par \par He has iron deficiency anemia or has required intravenous iron infusions on at least 2 occasions\par \par No red blood cell transfusion\par \par He submitted a stool Cologuard, but results are not yet known\par \par Is not having any heartburn, dysphagia, abdominal pain, change in bowel movements\par \par He does not tolerate oral iron very well\par \par He is coronary disease with stent placement number of years ago, he had a heart attack at that time and he actually had cardiac arrest but ultimately did well\par \par No recent chest pain, shortness of breath or palpitations\par \par He has regular followup with the cardiologist\par \par No family history of cancers of the esophagus, stomach, colon or rectum\par \par

## 2020-02-12 NOTE — PHYSICAL EXAM
[General Appearance - In No Acute Distress] : in no acute distress [General Appearance - Well Nourished] : well nourished [General Appearance - Alert] : alert [Sclera] : the sclera and conjunctiva were normal [General Appearance - Well Developed] : well developed [General Appearance - Well-Appearing] : healthy appearing [Neck Appearance] : the appearance of the neck was normal [Jugular Venous Distention Increased] : there was no jugular-venous distention [Neck Cervical Mass (___cm)] : no neck mass was observed [Heart Sounds] : normal S1 and S2 [Heart Rate And Rhythm] : heart rate was normal and rhythm regular [Auscultation Breath Sounds / Voice Sounds] : lungs were clear to auscultation bilaterally [Heart Sounds Gallop] : no gallops [Heart Sounds Pericardial Friction Rub] : no pericardial rub [Murmurs] : no murmurs [Edema] : there was no peripheral edema [Full Pulse] : the pedal pulses are present [Bowel Sounds] : normal bowel sounds [Abdomen Mass (___ Cm)] : no abdominal mass palpated [Abdomen Soft] : soft [Abdomen Tenderness] : non-tender [Occult Blood Positive] : stool was negative for occult blood [No Rectal Mass] : no rectal mass [Normal Sphincter Tone] : normal sphincter tone [Cervical Lymph Nodes Enlarged Posterior Bilaterally] : posterior cervical [Cervical Lymph Nodes Enlarged Anterior Bilaterally] : anterior cervical [Patient Refused] : rectal exam was refused by the patient [Supraclavicular Lymph Nodes Enlarged Bilaterally] : supraclavicular [Axillary Lymph Nodes Enlarged Bilaterally] : axillary [No CVA Tenderness] : no ~M costovertebral angle tenderness [Femoral Lymph Nodes Enlarged Bilaterally] : femoral [Inguinal Lymph Nodes Enlarged Bilaterally] : inguinal [No Spinal Tenderness] : no spinal tenderness [Abnormal Walk] : normal gait [Nail Clubbing] : no clubbing  or cyanosis of the fingernails [Musculoskeletal - Swelling] : no joint swelling seen [Motor Tone] : muscle strength and tone were normal [Skin Color & Pigmentation] : normal skin color and pigmentation [Skin Turgor] : normal skin turgor [] : no rash [No Focal Deficits] : no focal deficits [Impaired Insight] : insight and judgment were intact [Oriented To Time, Place, And Person] : oriented to person, place, and time [Affect] : the affect was normal

## 2020-02-12 NOTE — ASSESSMENT
[FreeTextEntry1] : Impression\par \par Iron deficiency anemia\par \par 79-year-old gentleman with a history of coronary disease, myocardial infarction, cardiac arrest a number of years ago\par \par Currently he seems cardiac stable, and follows up with cardiologist on a regular basis\par \par He is requiring intravenous iron infusions\par \par Cologuard results pending\par \par Suggest\par \par I spoke with the patient in great detail, unexplained that we can simply wait for the colon blood tests, however if it is negative that doesn't mean that there is not significant pathology and if it is positive obviously colonoscopy with CT virtual colonoscopy should be done\par \par He could simply schedule upper GI series and CT virtual colonoscopy\par \par Or he could schedule upper endoscopy and colonoscopy\par \par I spoke with the patient in great detail, risks benefits and alternatives of all been reviewed\par \par The patient would like to schedule both upper endoscopy and colonoscopy\par \par Cardiology clearance has been requested\par \par Procedure done in the hospital\par \par Suprep\par \par Risks/benefits:\par The procedure, the risks and benefits and alternatives have been reviewed in great detail with the patient.  Risks including, but not limited to sedation such as cardiac and pulmonary compromise, the procedure itself such as bleeding requiring hospitalization, transfusion, surgery, temporary or permanent colostomy.  Perforation or puncture of the requiring hospitalization, surgery, temporary colostomy.\par It has been explained to the patient that though colonoscopy is thought to be the best screening exam for colon cancer and polyps, no screening exam can find all colon polyps or cancers.  \par The patient expresses understanding of the procedure and consents to undergoing the procedure.\par \par The laxative, or its risks benefits and alternatives have been thoroughly reviewed with the patient in great detail. The laxative instructions have been reviewed in great detail with the patient.\par \par Suprep

## 2020-02-27 PROBLEM — I25.10 CAD (CORONARY ARTERY DISEASE): Status: ACTIVE | Noted: 2020-01-01

## 2020-04-02 PROBLEM — R19.7 DIARRHEA, UNSPECIFIED TYPE: Status: ACTIVE | Noted: 2020-01-01

## 2020-04-02 PROBLEM — D63.8 ANEMIA OF CHRONIC DISEASE: Status: ACTIVE | Noted: 2020-01-01

## 2020-04-02 NOTE — PLAN
[FreeTextEntry1] : Offered patient option of doing labwork today. He said that he would rather wait until his Hematology appt next week.\par Pt will reschedule colonoscopy when possible.\par Pt will contact me with any problems

## 2020-04-02 NOTE — HISTORY OF PRESENT ILLNESS
[FreeTextEntry1] : Interacted with patient on the video virtual connect.\par Pt reports that his diarrhea has improved. but is still present at times. He still sees blood on the toilet paper.\par  [de-identified] : Pt was supposed to have a colonoscopy soon with Dr. Carballo. However, the colonoscopy will have to be rescheduled due to the current situation with COVID-19.\par Pt has no cough or fever.\par Pt has an appt with Hematology next week to recheck his labs and to receive an iron infusion if necessary.\par Pt is overall feeling well

## 2020-05-06 PROBLEM — R25.2 MUSCLE CRAMP: Status: ACTIVE | Noted: 2017-11-15

## 2020-05-06 PROBLEM — R25.2 MUSCLE CRAMP, NOCTURNAL: Status: ACTIVE | Noted: 2019-05-16

## 2020-05-06 PROBLEM — R06.02 SOB (SHORTNESS OF BREATH): Status: ACTIVE | Noted: 2017-04-19

## 2020-05-06 NOTE — ASSESSMENT
[FreeTextEntry1] : Mr. Wagner is a 79 year old male with a history of RADS, allergy, GERD, RA, CAD, and BPH who video calls to the office today for a follow up pulmonary evaluation. He is currently stable from a pulmonary perspective - melanoma 2019\par \par His shortness of breath is multifactorial due to:\par -asthma\par -cardiac disease\par -poor breathing mechanics \par \par problem 1: RADs / mild intermittent asthma\par -continue Singulair 10 mg before bed\par -Asthma is  believed to be caused by inherited (genetic) and environmental factor, but its exact cause is unknown. Asthma may be triggered by allergens, lung infections, or irritants in the air. Asthma triggers are different for each person\par -Inhaler technique reviewed as well as oral hygiene techniques reviewed with patient. Avoidance of cold air, extremes of temperature, rescue inhaler should be used before exercise. Order of medication reviewed with patient. Recommended use of a cool mist humidifier in the bedroom.\par \par problem 2: allergic rhinitis\par -continue Xyzal 5 mg QHS\par -continue Flonase 1 sniff/nostril BID\par -Environmental measures for allergies were encouraged including mattress and pillow cover, air purifier, and environmental controls.\par \par problem 3: GERD\par -continue Protonix 40 mg QAM, pre-breakfast \par -recommended to use Probiotic Supreme\par -Rule of 2s: avoid eating too much, eating too late, eating too spicy, eating two hours before bed\par -Things to avoid including overeating, spicy foods, tight clothing, eating within three hours of bed, this list is not all inclusive. \par -For treatment of reflux, possible options discussed including diet control, H2 blockers, PPIs, as well as coating motility agents discussed as treatment options. Timing of meals and proximity of last meal to sleep were discussed. If symptoms persist, a formal gastrointestinal evaluation is needed.\par \par problem 4: overweight -(improved) \par -Weight loss, exercise, and diet control were discussed and are highly encouraged. Treatment options were given such as, aqua therapy, and contacting a nutritionist. Recommended to use the elliptical, stationary bike, less use of treadmill.  Obesity is associated with worsening asthma, shortness of breath, and potential for cardiac disease, diabetes, and other underlying medical conditions.\par \par problem 5: cardiac\par -recommended to follow up with his cardiologist, Rubin Odell\par \par problem 6: muscle cramps\par -recommended Theraworx for muscle ramps\par \par problem 7: nocturnal muscle cramps \par -recommended MyoCalm \par -recommended to use [place] cream\par \par problem 8 Health Maintenance/COVID19 Precautions:\par - Clean your hands often. Wash your hands often with soap and water for at least 20 seconds, especially after blowing your nose, coughing, or sneezing, or having been in a public place.\par - If soap and water are not available, use a hand  that contains at least 60% alcohol.\par - To the extent possible, avoid touching high-touch surfaces in public places - elevator buttons, door handles, handrails, handshaking with people, etc. Use a tissue or your sleeve to cover your hand or finger if you must touch something.\par - Wash your hands after touching surfaces in public places.\par - Avoid touching your face, nose, eyes, etc.\par - Clean and disinfect your home to remove germs: practice routine cleaning of frequently touched surfaces (for example: tables, doorknobs, light switches, handles, desks, toilets, faucets, sinks & cell phones)\par - Avoid crowds, especially in poorly ventilated spaces. Your risk of exposure to respiratory viruses like COVID-19 may increase in crowded, closed-in settings with little air circulation if there are people in the crowd who are sick. All patients are recommended to practice social distancing and stay at least 6 feet away from others.\par - Avoid all non-essential travel including plane trips, and especially avoid embarking on cruise ships.\par -If COVID-19 is spreading in your community, take extra measures to put distance between yourself and other people to further reduce your risk of being exposed to this new virus.\par -Stay home as much as possible.\par - Consider ways of getting food brought to your house through family, social, or commercial networks\par -Be aware that the virus has been known to live in the air up to 3 hours post exposure, cardboard up to 24 hours post exposure, copper up to 4 hours post exposure, steel and plastic up to 2-3 days post exposure. Risk of transmission from these surfaces are affected by many variables.\par Immune Support Recommendations:\par -OTC Vitamin C 500mg BID \par -OTC Quercetin 250-500mg BID \par -OTC Zinc 75-100mg per day \par -OTC Melatonin 1 or 2 mg a night \par -OTC Vitamin D 1-4000mg per day \par -OTC Tonic Water 8oz per day\par Asthma and COVID19:\par You need to make sure your asthma is under control. This often requires the use of inhaled corticosteroids (and sometimes oral corticosteroids). Inhaled corticosteroids do not likely reduce your immune system’s ability to fight infections, but oral corticosteroids may. It is important to use the steps above to protect yourself to limit your exposure to any respiratory virus. \par \par problem 9: health maintenance \par -s/p influenza vaccine -2019\par -recommended strep pneumonia vaccines: Prevnar-13 vaccine(January 2017), followed by Pneumo vaccine 23 one year following (repeat in January 2018)\par -recommended early intervention for URIs\par -recommended regular osteoporosis evaluations\par -recommended early dermatological evaluations\par -recommended after the age of 50 to the age of 70, colonoscopy every 5 years \par \par \par F/U in 6 months - [facility] / [facility]\par He is encouraged to call with any changes, concerns, or questions.

## 2020-05-06 NOTE — ADDENDUM
[FreeTextEntry1] : Documented by Marty Hughes acting as a scribe for Dr. Asim Narayan on 05/06/2020.\par \par All medical record entries made by the Scribe were at my, Dr. Asim Narayan's, direction and personally dictated by me on 05/06/2020. I have reviewed the chart and agree that the record accurately reflects my personal performance of the history, physical exam, assessment and plan. I have also personally directed, reviewed, and agree with the discharge instructions.

## 2020-05-06 NOTE — HISTORY OF PRESENT ILLNESS
[Home] : at home, [unfilled] , at the time of the visit. [Medical Office: ([facility])___] : at the medical office located in  [Patient] : the patient [Self] : self [FreeTextEntry2] : JOSE CRUZ TIERNEY  [FreeTextEntry1] : Mr. Wagner is a 79 year old male with a history of heart disease, asthma, allergies, GERD, RADs, SOB, who video calls in today for a follow up  visit. His chief complaint is \par \par -he notes episodes of chest pain\par -he notes issue was due to GI bleeding of lower tract\par -he notes saw cardiologist and stress test was stable\par -notes feelings of UTI but has not been examined yet\par -he notes melanoma is gone\par -he notes walking at home and acknowledges he must exercise more\par \par -he denies any headaches, nausea, vomiting, fever, chills, sweats, chest pressure, diarrhea, constipation, dysphagia, dizziness, sour taste in the mouth, leg swelling, leg pain, itchy eyes, itchy ears, heartburn, reflux, myalgias or arthralgias.

## 2020-05-06 NOTE — PHYSICAL EXAM
[General Appearance - Well Developed] : well developed [Normal Appearance] : normal appearance [Well Groomed] : well groomed [General Appearance - Well Nourished] : well nourished [No Deformities] : no deformities [General Appearance - In No Acute Distress] : no acute distress [Normal Conjunctiva] : the conjunctiva exhibited no abnormalities [Eyelids - No Xanthelasma] : the eyelids demonstrated no xanthelasmas [Normal Oropharynx] : normal oropharynx [II] : II [Neck Cervical Mass (___cm)] : no neck mass was observed [Jugular Venous Distention Increased] : there was no jugular-venous distention [Thyroid Diffuse Enlargement] : the thyroid was not enlarged [Thyroid Nodule] : there were no palpable thyroid nodules [Heart Rate And Rhythm] : heart rate and rhythm were normal [Heart Sounds] : normal S1 and S2 [Murmurs] : no murmurs present [Respiration, Rhythm And Depth] : normal respiratory rhythm and effort [Exaggerated Use Of Accessory Muscles For Inspiration] : no accessory muscle use [Auscultation Breath Sounds / Voice Sounds] : lungs were clear to auscultation bilaterally [Abdomen Soft] : soft [Abdomen Tenderness] : non-tender [Abdomen Mass (___ Cm)] : no abdominal mass palpated [Abnormal Walk] : normal gait [Gait - Sufficient For Exercise Testing] : the gait was sufficient for exercise testing [Nail Clubbing] : no clubbing of the fingernails [Cyanosis, Localized] : no localized cyanosis [Petechial Hemorrhages (___cm)] : no petechial hemorrhages [Skin Color & Pigmentation] : normal skin color and pigmentation [] : no rash [No Venous Stasis] : no venous stasis [Skin Lesions] : no skin lesions [No Skin Ulcers] : no skin ulcer [No Xanthoma] : no  xanthoma was observed [Deep Tendon Reflexes (DTR)] : deep tendon reflexes were 2+ and symmetric [Sensation] : the sensory exam was normal to light touch and pinprick [No Focal Deficits] : no focal deficits [Oriented To Time, Place, And Person] : oriented to person, place, and time [Impaired Insight] : insight and judgment were intact [Affect] : the affect was normal [FreeTextEntry1] : looks well

## 2020-05-06 NOTE — REASON FOR VISIT
[Follow-Up] : a follow-up visit [FreeTextEntry1] : video call- allergies, CAD, asthma, GERD, RADs, SOB,

## 2020-06-24 PROBLEM — R63.4 ABNORMAL WEIGHT LOSS: Status: ACTIVE | Noted: 2020-01-01

## 2020-06-24 PROBLEM — Z87.898 HISTORY OF DIARRHEA: Status: RESOLVED | Noted: 2020-01-01 | Resolved: 2020-01-01

## 2020-06-24 PROBLEM — D50.0 IRON DEFICIENCY ANEMIA DUE TO CHRONIC BLOOD LOSS: Status: ACTIVE | Noted: 2020-01-01

## 2020-06-24 PROBLEM — R19.7 FREQUENT LOOSE STOOLS: Status: ACTIVE | Noted: 2020-01-01

## 2020-06-24 NOTE — ASSESSMENT
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

## 2020-06-24 NOTE — REASON FOR VISIT
[FreeTextEntry1] : Iron deficiency anemia, drop in hemoglobin hematocrit to 6.8 hemoglobin with hematocrit of 23 despite iron infusions, weight loss, cardiac issues

## 2020-06-24 NOTE — PHYSICAL EXAM
[General Appearance - In No Acute Distress] : in no acute distress [General Appearance - Well Nourished] : well nourished [General Appearance - Alert] : alert [General Appearance - Well Developed] : well developed [General Appearance - Well-Appearing] : healthy appearing [Sclera] : the sclera and conjunctiva were normal [Neck Cervical Mass (___cm)] : no neck mass was observed [Jugular Venous Distention Increased] : there was no jugular-venous distention [Neck Appearance] : the appearance of the neck was normal [Heart Rate And Rhythm] : heart rate was normal and rhythm regular [Auscultation Breath Sounds / Voice Sounds] : lungs were clear to auscultation bilaterally [Heart Sounds Gallop] : no gallops [Heart Sounds] : normal S1 and S2 [Murmurs] : no murmurs [Heart Sounds Pericardial Friction Rub] : no pericardial rub [Edema] : there was no peripheral edema [Full Pulse] : the pedal pulses are present [Bowel Sounds] : normal bowel sounds [Abdomen Soft] : soft [Abdomen Tenderness] : non-tender [Abdomen Mass (___ Cm)] : no abdominal mass palpated [Normal Sphincter Tone] : normal sphincter tone [No Rectal Mass] : no rectal mass [Patient Refused] : rectal exam was refused by the patient [Cervical Lymph Nodes Enlarged Posterior Bilaterally] : posterior cervical [Axillary Lymph Nodes Enlarged Bilaterally] : axillary [Supraclavicular Lymph Nodes Enlarged Bilaterally] : supraclavicular [Cervical Lymph Nodes Enlarged Anterior Bilaterally] : anterior cervical [Femoral Lymph Nodes Enlarged Bilaterally] : femoral [No CVA Tenderness] : no ~M costovertebral angle tenderness [Inguinal Lymph Nodes Enlarged Bilaterally] : inguinal [Abnormal Walk] : normal gait [Nail Clubbing] : no clubbing  or cyanosis of the fingernails [No Spinal Tenderness] : no spinal tenderness [Motor Tone] : muscle strength and tone were normal [Musculoskeletal - Swelling] : no joint swelling seen [Skin Turgor] : normal skin turgor [Skin Color & Pigmentation] : normal skin color and pigmentation [] : no rash [Affect] : the affect was normal [Oriented To Time, Place, And Person] : oriented to person, place, and time [Impaired Insight] : insight and judgment were intact [No Focal Deficits] : no focal deficits [Occult Blood Positive] : stool was negative for occult blood

## 2020-06-24 NOTE — HISTORY OF PRESENT ILLNESS
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\par \par Patient with iron deficiency anemia\par \par He is losing weight\par \par He is receiving intravenous iron infusions yet having further drop in his hemoglobin to 6.8 hematocrit of 23\par \par He is having rectal bleeding\par \par He is having some fecal incontinence\par \par He is not having chest pain or shortness of breath\par \par No abdominal pain or cramping\par \par He is sent over from his cardiology office because of the drop in hemoglobin and hematocrit\par \par He has not been able to have his upper endoscopy or colonoscopy since February when I saw him in the office because of the COVID situation and endoscopy having been shut down

## 2020-06-25 NOTE — H&P ADULT - PROBLEM SELECTOR PLAN 10
dvt ppx SCDs in setting of GI bleed  ppx meds: pt takes vitamin B complex, folic acid, citracal, vit D  -spoke with patient- will hold while in the hospital   -allergies: cont home dose xyzal, flonase    -lower extremity edema: with no LE edema on exam, continue HCTZ home dose   -hyperglyceridemia: cont fenofibrate      IMPROVE VTE Individual Risk Assessment        RISK                                                          Points  [  ] Previous VTE                                                3  [  ] Thrombophilia                                             2  [  ] Lower limb paralysis                                   2        (unable to hold up >15 seconds)    [ 2 ] Current Cancer                                            2         (within 6 months)  [  ] Immobilization > 24 hrs                              1  [  ] ICU/CCU stay > 24 hours                            1  [ 1 ] Age > 60                                                    1  IMPROVE VTE Score ____3_____ dvt ppx SCDs in setting of GI bleed  -MARY consult nephro start LR at rate 50   ppx meds: pt takes vitamin B complex, folic acid, citracal, vit D  -spoke with patient- will hold while in the hospital   -allergies: cont home dose xyzal, flonase    -lower extremity edema: with no LE edema on exam, hold HCTZ as per nephro and given GIB with well controlled BP    -hyperglyceridemia: cont fenofibrate    -mild asthma: cont monteleukast  IMPROVE VTE Individual Risk Assessment        RISK                                                          Points  [  ] Previous VTE                                                3  [  ] Thrombophilia                                             2  [  ] Lower limb paralysis                                   2        (unable to hold up >15 seconds)    [ 2 ] Current Cancer                                            2         (within 6 months)  [  ] Immobilization > 24 hrs                              1  [  ] ICU/CCU stay > 24 hours                            1  [ 1 ] Age > 60                                                    1  IMPROVE VTE Score ____3_____ dvt ppx SCDs in setting of GI bleed  ppx meds: pt takes vitamin B complex, folic acid, citracal, vit D  -spoke with patient- will hold while in the hospital   -allergies: cont home dose xyzal, flonase    -lower extremity edema: with no LE edema on exam, hold HCTZ as per nephro and given GIB with well controlled BP    -hyperglyceridemia: cont fenofibrate    -mild asthma: cont monteleukast  IMPROVE VTE Individual Risk Assessment        RISK                                                          Points  [  ] Previous VTE                                                3  [  ] Thrombophilia                                             2  [  ] Lower limb paralysis                                   2        (unable to hold up >15 seconds)    [ 2 ] Current Cancer                                            2         (within 6 months)  [  ] Immobilization > 24 hrs                              1  [  ] ICU/CCU stay > 24 hours                            1  [ 1 ] Age > 60                                                    1  IMPROVE VTE Score ____3_____

## 2020-06-25 NOTE — CONSULT NOTE ADULT - ASSESSMENT
79y/o M PMH of CAD, HTN, HLD, prostate cancer (seed radiation 2009), MI (nov 2000, angioplasty w 3 stents to the right coronary artery), rheumatoid and psoriatic arthritis, plaque psoriasis, b/l hip replacements (left 1998, right 2002), C diff in 2014, urinary retention with self catheretization hypothyroidism, mild asthma, melanoma in situ presents for GIB. Pt states he noticed right red blood in his stools for 6 months. Pt has been feeling weakness, weight loss of 19 pounds since June 2019. Pt sent in by outpatient hematologist Dr. Bergeron given low Hgb outpatient. (6.8 on 6/24) Pt denies SOB or GEORGES, however feels weak when pushing heavy objects. Pt was scheduled with his GI doctor Dr. Mullen for outpatient colonoscopy in March 2020. However, colonoscopy canceled due to COVID19. Pt self catheterizes 5-6 times a day.        -there is no evidence of acute ischemia.  -ecg unchanged when compared to prior  -pt w/o anginal complaints    -there is no evidence of significant arrhythmia.    -there is no evidence for meaningful  volume overload.  TTE 12/1/18 mild AS, LVD, 45 %, no need to repeat    CAD S/P stent  -Stable  - CW BB at home dose  -CW Statin     HTN  - Controlled  - CW BB  at home dose  -Norvasc can be added if tighter BP control needed.  - hold HCTZ and ACEI for MARY  - monitor routine hemodynamics.     HLD  - CW statin    Anemia  -GI following   -S/P PRBC  -trend CBC  - Would transfuse to keep Hb>8 and watch volume status     MARY  -hold HCTZ and ACEI  -renal following  prerenal component  -avoid nephrotoxins and diuretics     - Optimized for the EGD from a cardiac point of view with no evidence of active ischemic heart disease, decompensated heart failure, severe obstructive valvular disease, or uncontrolled arrhythmia.  - BP well controlled, monitor routine hemodynamic   -Monitor and replete lytes, keep K>4 and Mg >2  - Further cardiac workup will depend on clinical course.   - All other workup per primary team  Thank you for the consult  Will continue to follow  Rubin Justice DNP, ANP-c  Cardiology   Spectra #[id number] [phone] 79y/o M PMH of CAD, HTN, HLD, prostate cancer (seed radiation 2009), MI (nov 2000, angioplasty w 3 stents to the right coronary artery), rheumatoid and psoriatic arthritis, plaque psoriasis, b/l hip replacements (left 1998, right 2002), C diff in 2014, urinary retention with self catheretization hypothyroidism, mild asthma, melanoma in situ presents for GIB. Pt states he noticed right red blood in his stools for 6 months. Pt has been feeling weakness, weight loss of 19 pounds since June 2019. Pt sent in by outpatient hematologist Dr. Bergeron given low Hgb outpatient. (6.8 on 6/24) Pt denies SOB or GEORGES, however feels weak when pushing heavy objects. Pt was scheduled with his GI doctor Dr. Mullen for outpatient colonoscopy in March 2020. However, colonoscopy canceled due to COVID19. Pt self catheterizes 5-6 times a day.    - there is no evidence of acute ischemia.  - ecg unchanged when compared to prior  - pt w/o anginal complaints  - there is no evidence of significant arrhythmia.    - there is no evidence for meaningful  volume overload.  - TTE 12/1/18 mild AS, LVD,45 %, no need to repeat    CAD S/P stent  - Stable  - CW BB at home dose  - CW Statin     HTN  - Controlled  - CW BB at home dose  - Norvasc can be added if tighter BP control needed.  - hold HCTZ and ACEI for MARY  - monitor routine hemodynamics.     HLD  - CW statin    Anemia  - GI following   - S/P PRBCs  - trend CBC  - Would transfuse to keep Hb>8 and watch volume status     MARY  -hold HCTZ and ACEI  -avoid nephrotoxins and diuretics     - Optimized for the EGD from a cardiac point of view with no evidence of active ischemic heart disease, decompensated heart failure, severe obstructive valvular disease, or uncontrolled arrhythmia, if needed.    - Monitor and replete lytes, keep K>4 and Mg >2  - Further cardiac workup will depend on clinical course.   - All other workup per primary team  Thank you for the consult  Will continue to follow  Rubin Justice DNP, ANP-c  Cardiology   Spectra #[id number]  [phone]

## 2020-06-25 NOTE — H&P ADULT - PROBLEM SELECTOR PLAN 9
-plaque psoriasis, psoriatic and rheumatoid arthiris  -cont home dose prednisone  -pt may bring in fluocinonide cream -plaque psoriasis, psoriatic and rheumatoid arthritis  -cont home dose prednisone  -pt may bring in fluocinonide cream

## 2020-06-25 NOTE — H&P ADULT - NSHPSOCIALHISTORY_GEN_ALL_CORE
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

## 2020-06-25 NOTE — H&P ADULT - PROBLEM SELECTOR PLAN 4
-chronic  -continue home dose _____ -chronic  -continue home dose toprol XL with hold parameters -s/p seed radiation 2009  -finasteride home dose

## 2020-06-25 NOTE — ED PROVIDER NOTE - ATTENDING CONTRIBUTION TO CARE
79 yo m p/w reported chronically has waxing / waning GI bleeding - red blood. NO fever/chills. no cp/sob/palp. Pt with some fatigue - sent to ed for anemia - need for xfuision. no neck / back pain. no numb/ting/focal weak. no recent trauma. ? recnt increase in amt of gi bleed. no other acute co  exam: MM Moist. neck supple. non-toxic, well appearing. abd soft NT, no hsm. no cvat. Some mm pallor. No c/c/e. No other acute findings.  Check labs, XR, TBA, xfusion

## 2020-06-25 NOTE — H&P ADULT - NSHPOUTPATIENTPROVIDERS_GEN_ALL_CORE
Dr. Shyann Gilbert Dr. Shyann Gilbert PCP  rheumatologist Dr. Chato Carballo  cardiology Dr. Sung  urologist Dr. Duffy  Pulmonologist Dr. Asim Narayan  Radiation oncologist Dr. Richard Ferreira   Podiatrist Dr. del Velasco   pharmacy 101 Dr. Shyann Gilbert PCP  rheumatologist Dr. Chato Carballo  cardiology Dr. Sung  urologist Dr. Duffy  Pulmonologist Dr. Asim Narayan  Radiation oncologist Dr. Richard Ferreira   Podiatrist Dr. del Velasco   oncologist Dr. Rubio Leger, [facility]  [facility]

## 2020-06-25 NOTE — H&P ADULT - PROBLEM SELECTOR PLAN 7
-chronic  -cont home dose levothyroxine -chronic  -cont home dose levothyroxine  -f/u TSH -chronic  -continue home dose simvastatin

## 2020-06-25 NOTE — CONSULT NOTE ADULT - SUBJECTIVE AND OBJECTIVE BOX
Patient is a 80y old  Male who presents with a chief complaint of GI bleeding (25 Jun 2020 14:08)      HPI:  79 YO M with pmhx of CAD, HTN, HLD, prostate cancer (seed radiation 2009), MI (nov 2000, angioplasty w 3 stents to the right coronary artery), rheumatoid and psoriatic arthritis, plaque psoriasis, b/l hip replacements (left 1998, right 2002), C diff in 2014, urinary retention with self catheretization hypothyroidism, mild asthma, melanoma in situ presents for GIB. Pt states he noticed right red blood in his stools for 6 months. Pt has been feeling weakness, weight loss of 19 pounds since June 2019. Pt sent in by outpatient hematologist Dr. Bergeron given low Hgb outpatient. (6.8 on 6/24) Pt denies SOB or GEORGES, however feels weak when pushing heavy objects. Pt was scheduled with his GI doctor Dr. Mullen for outpatient colonoscopy in March 2020. However, colonoscopy canceled due to COVID19. Pt self catheterizes 5-6 times a day. Pt admits to burning prior to catheterization when his bladder is full. Pt denies fever, chills, hematemesis, hemoptysis, hematuria, melena, SOB, GEORGES, CP, abdominal pain.     ED vitals significant for temp 98 F, HR 79, /55, RR 16, 93% on room air. Pt's labs significant for +FOBT , wbc 13, Hgb 7.5, hct 25.2, 94% neutrophils, BUN 30, Cr 1.5, AST 95. Pt type and screen performed. CXR showed no acute abnormalities. Pt put on remote tele, made NPO, s/p pantoprazole 40 IV, famotidine 20 IV. 1 unit of pRBCs pending. EKG showed rate 69, NSR, LVH, LAD. (25 Jun 2020 13:46)       ROS:  Negative except for:    PAST MEDICAL & SURGICAL HISTORY:  Asthma  Melanoma in situ  Hypothyroid  H/O Clostridium difficile infection  Urinary retention  Plaque psoriasis  History of urinary self-catheterization  Arthritis  Prostate CA: seed therapy aug 2009  Hyperlipidemia  Hypertension  CAD (coronary artery disease)  Acute MI  S/P tonsillectomy  H/O hernia repair  S/P cardiac cath      SOCIAL HISTORY:    FAMILY HISTORY:  FH: asthma  FH: Alzheimers disease      MEDICATIONS  (STANDING):  ferrous    sulfate 325 milliGRAM(s) Oral daily  finasteride 5 milliGRAM(s) Oral daily  fluticasone propionate 50 MICROgram(s)/spray Nasal Spray 2 Spray(s) Both Nostrils <User Schedule>  lactated ringers. 1000 milliLiter(s) (50 mL/Hr) IV Continuous <Continuous>  levothyroxine 25 MICROGram(s) Oral daily  loratadine 10 milliGRAM(s) Oral daily  metoprolol succinate  milliGRAM(s) Oral daily  montelukast 10 milliGRAM(s) Oral at bedtime  nitrofurantoin macrocrystals (MACRODANTIN) 50 milliGRAM(s) Oral <User Schedule>  pantoprazole  Injectable 40 milliGRAM(s) IV Push two times a day  predniSONE   Tablet 1 milliGRAM(s) Oral four times a day  simvastatin 40 milliGRAM(s) Oral at bedtime  tamsulosin 0.4 milliGRAM(s) Oral at bedtime    MEDICATIONS  (PRN):      Allergies    cephalexin (Urticaria)  clindamycin (Diarrhea)  erythromycin (Other)  Methotrexate Sodium (Unknown)  penicillins (Hives)  sporalin (oxypsoralin/PUVA) (Other)  sulfa drugs (Rash)    Intolerances        Vital Signs Last 24 Hrs  T(C): 36.9 (25 Jun 2020 20:15), Max: 36.9 (25 Jun 2020 20:15)  T(F): 98.4 (25 Jun 2020 20:15), Max: 98.4 (25 Jun 2020 20:15)  HR: 69 (25 Jun 2020 20:15) (69 - 79)  BP: 146/70 (25 Jun 2020 20:15) (112/55 - 146/70)  BP(mean): --  RR: 16 (25 Jun 2020 20:15) (16 - 16)  SpO2: 94% (25 Jun 2020 20:15) (93% - 94%)    PHYSICAL EXAM  General: adult in NAD  HEENT: clear oropharynx, anicteric sclera, pink conjunctivae  Neck: supple  CV: normal S1S2 with no murmur rubs or gallops  Lungs: clear to auscultation, no wheezes, no rhales  Abdomen: soft non-tender non-distended, no hepato/splenomegaly  Ext: no clubbing cyanosis or edema  Skin: no rashes and no petichiae  Neuro: alert and oriented X3 no focal deficits      LABS:    CBC Full  -  ( 25 Jun 2020 18:21 )  WBC Count : x  RBC Count : x  Hemoglobin : 7.7 g/dL  Hematocrit : 24.6 %  Platelet Count - Automated : x  Mean Cell Volume : x  Mean Cell Hemoglobin : x  Mean Cell Hemoglobin Concentration : x  Auto Neutrophil # : x  Auto Lymphocyte # : x  Auto Monocyte # : x  Auto Eosinophil # : x  Auto Basophil # : x  Auto Neutrophil % : x  Auto Lymphocyte % : x  Auto Monocyte % : x  Auto Eosinophil % : x  Auto Basophil % : x    06-25    141  |  105  |  30<H>  ----------------------------<  88  3.9   |  28  |  1.50<H>    Ca    9.2      25 Jun 2020 11:48    TPro  6.5  /  Alb  3.1<L>  /  TBili  0.3  /  DBili  x   /  AST  95<H>  /  ALT  51  /  AlkPhos  107  06-25    PT/INR - ( 25 Jun 2020 11:48 )   PT: 12.7 sec;   INR: 1.13 ratio         PTT - ( 25 Jun 2020 11:48 )  PTT:31.7 sec          BLOOD SMEAR INTERPRETATION:    RADIOLOGY & ADDITIONAL STUDIES:

## 2020-06-25 NOTE — ED PROVIDER NOTE - OBJECTIVE STATEMENT
pt is a 79yo male with pmhx of CAD, HTN, HLD, prostate cancer presents with GIB. pt reports bright red blood in stool since last summer. pt was supposed to have a colonoscopy with dr mar in april but was canceled due to covid. pt was seen and evaluated by dr gao who advised pt to come to ed for eval. pt denies fever, cp, sob, n/v/d, anticoagulant use.

## 2020-06-25 NOTE — H&P ADULT - HISTORY OF PRESENT ILLNESS
81 YO M with pmhx of CAD, HTN, HLD, prostate cancer presents with GIB. Pt states he noticed ____ amount bright red blood in his stool since summer of 2019. Pt was unable to see Dr. Mullen outpatient for colonoscopy due to COVID19. Pt denies _____.     ED vitals significant for temp 98 F, HR 79, /55, RR 16, 93% on room air. Pt's labs significant for +FOBT , wbc 13, Hgb 7.5, hct 25.2, 94% neutrophils, BUN 30, Cr 1.5, AST 95. Pt type and screen performed. CXR showed no acute abnormalities. Pt put on remote tele, made NPO, s/p pantoprazole 40 IV, famotidine 20 IV. 1 unit of pRBCs pending. EKG showed _____ 81 YO M with pmhx of CAD, HTN, HLD, prostate cancer (seed radiation 2009), MI (nov 2000, angioplasty w 3 stents tot he right coronary artery), rheumatoid and psoriatic arthritis, plaque psoriasis, b/l hip replacements (left 1998, right 2002), C diff in 2014, urinary retention with self catheretization hypothyroidism, mild asthma, melanoma in situ presents for GIB. Pt states he noticed right red blood in his stools for 6 months. Pt has been feeling weakness, weight loss of 19 pounds since June 2019. Pt denies SOB or GEORGES, however feels weak when pushing heavy objects. Pt was scheduled with his GI doctor Dr. Mullen for outpatient colonoscopy in March 2020. However, colonoscopy canceled due to COVID19. Pt self catheterizes 5-6 times a day. Pt admits to burning prior to catheterization when his bladder is full. Pt denies fever, chills, hematemesis, hemoptysis, hematuria, melena, SOB, GEORGES, CP, abdominal pain.     ED vitals significant for temp 98 F, HR 79, /55, RR 16, 93% on room air. Pt's labs significant for +FOBT , wbc 13, Hgb 7.5, hct 25.2, 94% neutrophils, BUN 30, Cr 1.5, AST 95. Pt type and screen performed. CXR showed no acute abnormalities. Pt put on remote tele, made NPO, s/p pantoprazole 40 IV, famotidine 20 IV. 1 unit of pRBCs pending. EKG showed rate 69, NSR, LVH, LAD. 79 YO M with pmhx of CAD, HTN, HLD, prostate cancer (seed radiation 2009), MI (nov 2000, angioplasty w 3 stents tot he right coronary artery), rheumatoid and psoriatic arthritis, plaque psoriasis, b/l hip replacements (left 1998, right 2002), C diff in 2014, urinary retention with self catheretization hypothyroidism, mild asthma, melanoma in situ presents for GIB. Pt states he noticed right red blood in his stools for 6 months. Pt has been feeling weakness, weight loss of 19 pounds since June 2019. Pt sent in by outpatient hematologist Dr. Bergeron given low Hgb outpatient. (6.8 on 6/24) Pt denies SOB or GEORGES, however feels weak when pushing heavy objects. Pt was scheduled with his GI doctor Dr. Mullen for outpatient colonoscopy in March 2020. However, colonoscopy canceled due to COVID19. Pt self catheterizes 5-6 times a day. Pt admits to burning prior to catheterization when his bladder is full. Pt denies fever, chills, hematemesis, hemoptysis, hematuria, melena, SOB, GEORGES, CP, abdominal pain.     ED vitals significant for temp 98 F, HR 79, /55, RR 16, 93% on room air. Pt's labs significant for +FOBT , wbc 13, Hgb 7.5, hct 25.2, 94% neutrophils, BUN 30, Cr 1.5, AST 95. Pt type and screen performed. CXR showed no acute abnormalities. Pt put on remote tele, made NPO, s/p pantoprazole 40 IV, famotidine 20 IV. 1 unit of pRBCs pending. EKG showed rate 69, NSR, LVH, LAD. 81 YO M with pmhx of CAD, HTN, HLD, prostate cancer (seed radiation 2009), MI (nov 2000, angioplasty w 3 stents to the right coronary artery), rheumatoid and psoriatic arthritis, plaque psoriasis, b/l hip replacements (left 1998, right 2002), C diff in 2014, urinary retention with self catheretization hypothyroidism, mild asthma, melanoma in situ presents for GIB. Pt states he noticed right red blood in his stools for 6 months. Pt has been feeling weakness, weight loss of 19 pounds since June 2019. Pt sent in by outpatient hematologist Dr. Bergeron given low Hgb outpatient. (6.8 on 6/24) Pt denies SOB or GEORGES, however feels weak when pushing heavy objects. Pt was scheduled with his GI doctor Dr. Mullen for outpatient colonoscopy in March 2020. However, colonoscopy canceled due to COVID19. Pt self catheterizes 5-6 times a day. Pt admits to burning prior to catheterization when his bladder is full. Pt denies fever, chills, hematemesis, hemoptysis, hematuria, melena, SOB, GEORGES, CP, abdominal pain.     ED vitals significant for temp 98 F, HR 79, /55, RR 16, 93% on room air. Pt's labs significant for +FOBT , wbc 13, Hgb 7.5, hct 25.2, 94% neutrophils, BUN 30, Cr 1.5, AST 95. Pt type and screen performed. CXR showed no acute abnormalities. Pt put on remote tele, made NPO, s/p pantoprazole 40 IV, famotidine 20 IV. 1 unit of pRBCs pending. EKG showed rate 69, NSR, LVH, LAD.

## 2020-06-25 NOTE — H&P ADULT - NSHPREVIEWOFSYSTEMS_GEN_ALL_CORE
CONSTITUTIONAL: denies fever, chills, fatigue, weakness  HEENT: denies blurred visions, sore throat  SKIN: denies new lesions, rash  CARDIOVASCULAR: denies chest pain, chest pressure, palpitations  RESPIRATORY: denies shortness of breath, sputum production  GASTROINTESTINAL: denies nausea, vomiting, diarrhea, abdominal pain  GENITOURINARY: denies dysuria, discharge  NEUROLOGICAL: denies numbness, headache, focal weakness  MUSCULOSKELETAL: denies new joint pain, muscle aches  HEMATOLOGIC: denies gross bleeding, bruising  LYMPHATICS: denies enlarged lymph nodes, extremity swelling  PSYCHIATRIC: denies recent changes in anxiety, depression  ENDOCRINOLOGIC: denies sweating, cold or heat intolerance CONSTITUTIONAL: admits to weakness, weight loss, denies fever, chills  HEENT: lightheadedness, vertigo, denies blurred visions, sore throat  SKIN: denies paleness, new lesions, rash  CARDIOVASCULAR: denies chest pain, palpitations  RESPIRATORY: denies shortness of breath, hemoptysis, sputum production  GASTROINTESTINAL: admits to hematochezia, denies melena, hematemesis, denies nausea, vomiting, diarrhea, abdominal pain  GENITOURINARY: denies dysuria, hematuria   NEUROLOGICAL: denies numbness, headache

## 2020-06-25 NOTE — H&P ADULT - PROBLEM SELECTOR PLAN 2
-chronic  -continue home dose _____ -cs/p seed radiation 2009  -finasteride home dose -MARY likely 2/2 hypoxic/prerenal injury driven by GI bleeding  -UA  -Urine lytes  -LE at 50 while NPO  -hold HCTZ, ACEI as per nephro   -Renal US if creatinine worsening -MARY on CKD2 likely 2/2 hypoxic prerenal injury, driven by GI bleeding  -UA  -Urine lytes  -LE at 50 while NPO  -hold HCTZ, ACEI as per nephro   -Renal US if creatinine worsening  -nephro consulted Salvatore Linda -MARY on CKD2 likely 2/2 hypoxic prerenal injury, driven by GI bleeding  -UA  -Urine lytes  -continue IVF and monitor volume status  -hold HCTZ, ACEI as per nephro   -Renal US if creatinine worsening  -nephro consulted Salvatore Linda

## 2020-06-25 NOTE — H&P ADULT - PROBLEM SELECTOR PLAN 1
-GI bleed complicated with anemia, bright red blood per rectum since may 2019  -s/p pantoprazole 40 IV, famotidine 20 IV  -Hgb 13.44  -type and screen completed  -1 unit pRBCs in progress  -remote tele   -NPO, IVF   -f/u H/H at 18:00 and in the AM   -PPI BID   -consult GI Dr. Tanner   -consult cardio Dr. Rain -GI bleed complicated with anemia, pt with hx of iron deficiency anemia, bright red blood per rectum since may 2019  -s/p pantoprazole 40 IV, famotidine 20 IV  -Hgb 7.5, +FOBT   -type and screen completed  -1 unit pRBCs in progress  -remote tele   -NPO, IVF   -f/u H/H at 18:00 and 08:00   -cont home dose iron, pt also received iron infusions every 4 weeks   -PPI BID   -f/u iron panel   -consulted and spoke with GI Dr. Rajesh Carballo: (pt's outpatient GI doctor). Dr. Carballo would like pt to recieved blood as needed to correct his anemia, and follow up outpatient for colonoscopy and endoscopy.    -consult cardio Dr. Sung -GI bleed complicated with anemia, pt with hx of iron deficiency anemia, bright red blood per rectum since may 2019  -admit to medicine with remote tele   -s/p pantoprazole 40 IV, famotidine 20 IV  -continue IV protonix   -Hgb 7.5, +FOBT   -type and screen completed  -1 unit pRBCs in progress  -remote tele   -NPO, IVF   -f/u H/H at 18:00 and 08:00   -cont home dose iron, pt also received iron infusions every 4 weeks   -PPI BID   -f/u iron panel   -consulted and spoke with GI Dr. Rajesh Carballo: (pt's outpatient GI doctor). Dr. Carballo would like pt to recieved blood as needed to correct his anemia, and follow up outpatient for colonoscopy and endoscopy.    -consult cardio Dr. Sung -GI bleed complicated with anemia, pt with hx of iron deficiency anemia, bright red blood per rectum since may 2019  -admit to medicine with remote tele   -s/p pantoprazole 40 IV, famotidine 20 IV  -continue IV protonix BID   -Hgb 7.5, +FOBT   -type and screen completed  -1 unit pRBCs in progress  -remote tele   -NPO, IVF   -f/u H/H at 18:00 and 08:00   -cont PO iron qD, pt also received iron infusions every 4 weeks   -f/u iron panel   -consulted and spoke with GI Dr. Rajesh Carballo: (pt's outpatient GI doctor). Dr. Carballo would like pt to receive blood as needed to correct his anemia, and follow up outpatient for colonoscopy and endoscopy.    -consult cardio Dr. Sung

## 2020-06-25 NOTE — ED PROVIDER NOTE - CLINICAL SUMMARY MEDICAL DECISION MAKING FREE TEXT BOX
pt with GIB for months unable to get colonoscopy due to covid with anemia. will do labs to eval H&H, ekg, cxr, occult, transfuse as necessary, admit to medicine.

## 2020-06-25 NOTE — ED PROVIDER NOTE - NONTENDER LOCATION
suprapubic/left lower quadrant/left upper quadrant/right upper quadrant/right lower quadrant/umbilical/right costovertebral angle/periumbilical/left costovertebral angle

## 2020-06-25 NOTE — H&P ADULT - NSICDXPASTMEDICALHX_GEN_ALL_CORE_FT
PAST MEDICAL HISTORY:  Acute MI     CAD (coronary artery disease)     Hyperlipidemia     Hypertension     Prostate CA PAST MEDICAL HISTORY:  Acute MI     Arthritis prosiatic and rheumatoid arthritis    Asthma     CAD (coronary artery disease)     H/O Clostridium difficile infection     History of urinary self-catheterization     Hyperlipidemia     Hypertension     Hypothyroid     Melanoma in situ     Plaque psoriasis     Prostate CA seed therapy aug 2009    Urinary retention PAST MEDICAL HISTORY:  Acute MI     Arthritis     Asthma     CAD (coronary artery disease)     H/O Clostridium difficile infection     History of urinary self-catheterization     Hyperlipidemia     Hypertension     Hypothyroid     Melanoma in situ     Plaque psoriasis     Prostate CA seed therapy aug 2009    Urinary retention

## 2020-06-25 NOTE — H&P ADULT - NSICDXPASTSURGICALHX_GEN_ALL_CORE_FT
PAST SURGICAL HISTORY:  No significant past surgical history PAST SURGICAL HISTORY:  H/O hernia repair     S/P cardiac cath     S/P tonsillectomy

## 2020-06-25 NOTE — H&P ADULT - PROBLEM SELECTOR PLAN 6
dvt ppx SCDs in setting of GI bleed    IMPROVE VTE Individual Risk Assessment        RISK                                                          Points  [  ] Previous VTE                                                3  [  ] Thrombophilia                                             2  [  ] Lower limb paralysis                                   2        (unable to hold up >15 seconds)    [ 2 ] Current Cancer                                            2         (within 6 months)  [  ] Immobilization > 24 hrs                              1  [  ] ICU/CCU stay > 24 hours                            1  [ 1 ] Age > 60                                                    1  IMPROVE VTE Score ____3_____ -chronic  -continue home dose simvastatin CAD with Hx of MI in 2000   -continue home dose vasotec

## 2020-06-25 NOTE — CONSULT NOTE ADULT - SUBJECTIVE AND OBJECTIVE BOX
CHIEF COMPLAINT: Patient is a 80y old  Male who presents with a chief complaint of GI bleeding     HPI:  79 YO M with pmhx of CAD, HTN, HLD, prostate cancer (seed radiation 2009), MI (nov 2000, angioplasty w 3 stents to the right coronary artery), rheumatoid and psoriatic arthritis, plaque psoriasis, b/l hip replacements (left 1998, right 2002), C diff in 2014, urinary retention with self catheretization hypothyroidism, mild asthma, melanoma in situ presents for GIB. Pt states he noticed right red blood in his stools for 6 months. Pt has been feeling weakness, weight loss of 19 pounds since June 2019. Pt sent in by outpatient hematologist Dr. Bergeron given low Hgb outpatient. (6.8 on 6/24) Pt denies SOB or GEORGES, however feels weak when pushing heavy objects. Pt was scheduled with his GI doctor Dr. Mullen for outpatient colonoscopy in March 2020. However, colonoscopy canceled due to COVID19. Pt self catheterizes 5-6 times a day. Pt admits to burning prior to catheterization when his bladder is full. Pt denies fever, chills, hematemesis, hemoptysis, hematuria, melena, SOB, GEORGES, CP, abdominal pain.     ED vitals significant for temp 98 F, HR 79, /55, RR 16, 93% on room air. Pt's labs significant for +FOBT , wbc 13, Hgb 7.5, hct 25.2, 94% neutrophils, BUN 30, Cr 1.5, AST 95. Pt type and screen performed. CXR showed no acute abnormalities. Pt put on remote tele, made NPO, s/p pantoprazole 40 IV, famotidine 20 IV. 1 unit of pRBCs pending. EKG showed rate 69, NSR, LVH, LAD.     PAST MEDICAL & SURGICAL HISTORY:  Asthma  Melanoma in situ  Hypothyroid  H/O Clostridium difficile infection  Urinary retention  Plaque psoriasis  History of urinary self-catheterization  Arthritis  Prostate CA: seed therapy aug 2009  Hyperlipidemia  Hypertension  CAD (coronary artery disease)  Acute MI  S/P tonsillectomy  H/O hernia repair  S/P cardiac cath      SOCIAL HISTORY:  No tobacco, ethanol, or drug abuse.    FAMILY HISTORY:  FH: asthma  FH: Alzheimers disease    No family history of acute MI or sudden cardiac death.    MEDICATIONS  (STANDING):  fenofibrate Tablet 160 milliGRAM(s) Oral daily  ferrous    sulfate 325 milliGRAM(s) Oral daily  finasteride 5 milliGRAM(s) Oral daily  fluticasone propionate 50 MICROgram(s)/spray Nasal Spray 2 Spray(s) Both Nostrils <User Schedule>  lactated ringers. 1000 milliLiter(s) (50 mL/Hr) IV Continuous <Continuous>  levothyroxine 25 MICROGram(s) Oral daily  loratadine 10 milliGRAM(s) Oral daily  metoprolol succinate  milliGRAM(s) Oral daily  montelukast 10 milliGRAM(s) Oral at bedtime  nitrofurantoin macrocrystals (MACRODANTIN) 50 milliGRAM(s) Oral <User Schedule>  pantoprazole  Injectable 40 milliGRAM(s) IV Push two times a day  predniSONE   Tablet 1 milliGRAM(s) Oral four times a day  simvastatin 40 milliGRAM(s) Oral at bedtime  tamsulosin 0.4 milliGRAM(s) Oral at bedtime    MEDICATIONS  (PRN):      Allergies    cephalexin (Urticaria)  clindamycin (Diarrhea)  erythromycin (Other)  Methotrexate Sodium (Unknown)  penicillins (Hives)  sporalin (oxypsoralin/PUVA) (Other)  sulfa drugs (Rash)    Intolerances        REVIEW OF SYSTEMS:    CONSTITUTIONAL: No weakness, fevers or chills  EYES/ENT: No visual changes;  No vertigo or throat pain   NECK: No pain or stiffness  RESPIRATORY: No cough, wheezing, hemoptysis; No shortness of breath  CARDIOVASCULAR: No chest pain or palpitations  GASTROINTESTINAL: No abdominal pain. No nausea, vomiting, or hematemesis; No diarrhea or constipation. No melena or hematochezia.  GENITOURINARY: No dysuria, frequency or hematuria  NEUROLOGICAL: No numbness or weakness  SKIN: No itching or rash  All other review of systems is negative unless indicated above    VITAL SIGNS:   Vital Signs Last 24 Hrs  T(C): 36.7 (25 Jun 2020 11:10), Max: 36.7 (25 Jun 2020 11:10)  T(F): 98 (25 Jun 2020 11:10), Max: 98 (25 Jun 2020 11:10)  HR: 79 (25 Jun 2020 11:10) (79 - 79)  BP: 112/55 (25 Jun 2020 11:10) (112/55 - 112/55)  BP(mean): --  RR: 16 (25 Jun 2020 11:10) (16 - 16)  SpO2: 93% (25 Jun 2020 11:10) (93% - 93%)    I&O's Summary      On Exam:    Constitutional: NAD, alert and oriented x 3  Lungs:  Non-labored, breath sounds are clear bilaterally, No wheezing, rales or rhonchi  Cardiovascular: RRR.  S1 and S2 positive.  No murmurs, rubs, gallops or clicks  Gastrointestinal: Bowel Sounds present, soft, nontender.   Lymph: No peripheral edema. No cervical lymphadenopathy.  Neurological: Alert, no focal deficits  Skin: No rashes or ulcers   Psych:  Mood & affect appropriate.    LABS: All Labs Reviewed:                        7.5    13.44 )-----------( 334      ( 25 Jun 2020 11:48 )             25.2     25 Jun 2020 11:48    141    |  105    |  30     ----------------------------<  88     3.9     |  28     |  1.50     Ca    9.2        25 Jun 2020 11:48    TPro  6.5    /  Alb  3.1    /  TBili  0.3    /  DBili  x      /  AST  95     /  ALT  51     /  AlkPhos  107    25 Jun 2020 11:48    PT/INR - ( 25 Jun 2020 11:48 )   PT: 12.7 sec;   INR: 1.13 ratio         PTT - ( 25 Jun 2020 11:48 )  PTT:31.7 sec      Blood Culture:         RADIOLOGY:    EKG:    TTE: CHIEF COMPLAINT: Patient is a 80y old  Male who presents with a chief complaint of GI bleeding     HPI:  81 YO M with pmhx of CAD, HTN, HLD, prostate cancer (seed radiation 2009), MI (nov 2000, angioplasty w 3 stents to the right coronary artery), rheumatoid and psoriatic arthritis, plaque psoriasis, b/l hip replacements (left 1998, right 2002), C diff in 2014, urinary retention with self catheretization hypothyroidism, mild asthma, melanoma in situ presents for GIB. Pt states he noticed right red blood in his stools for 6 months. Pt has been feeling weakness, weight loss of 19 pounds since June 2019. Pt sent in by outpatient hematologist Dr. Bergeron given low Hgb outpatient. (6.8 on 6/24) Pt denies SOB or GEORGES, however feels weak when pushing heavy objects. Pt was scheduled with his GI doctor Dr. Mullen for outpatient colonoscopy in March 2020. However, colonoscopy canceled due to COVID19. Pt self catheterizes 5-6 times a day. Pt admits to burning prior to catheterization when his bladder is full. Pt denies fever, chills, hematemesis, hemoptysis, hematuria, melena, SOB, GEORGES, CP, abdominal pain.     ED vitals significant for temp 98 F, HR 79, /55, RR 16, 93% on room air. Pt's labs significant for +FOBT , wbc 13, Hgb 7.5, hct 25.2, 94% neutrophils, BUN 30, Cr 1.5, AST 95. Pt type and screen performed. CXR showed no acute abnormalities. Pt put on remote tele, made NPO, s/p pantoprazole 40 IV, famotidine 20 IV. 1 unit of pRBCs pending. EKG showed rate 69, NSR, LVH, LAD.     PAST MEDICAL & SURGICAL HISTORY:  Asthma  Melanoma in situ  Hypothyroid  H/O Clostridium difficile infection  Urinary retention  Plaque psoriasis  History of urinary self-catheterization  Arthritis  Prostate CA: seed therapy aug 2009  Hyperlipidemia  Hypertension  CAD (coronary artery disease)  Acute MI  S/P tonsillectomy  H/O hernia repair  S/P cardiac cath      SOCIAL HISTORY:  No tobacco, ethanol, or drug abuse.    FAMILY HISTORY:  FH: asthma  FH: Alzheimers disease    No family history of acute MI or sudden cardiac death.    MEDICATIONS  (STANDING):  fenofibrate Tablet 160 milliGRAM(s) Oral daily  ferrous    sulfate 325 milliGRAM(s) Oral daily  finasteride 5 milliGRAM(s) Oral daily  fluticasone propionate 50 MICROgram(s)/spray Nasal Spray 2 Spray(s) Both Nostrils <User Schedule>  lactated ringers. 1000 milliLiter(s) (50 mL/Hr) IV Continuous <Continuous>  levothyroxine 25 MICROGram(s) Oral daily  loratadine 10 milliGRAM(s) Oral daily  metoprolol succinate  milliGRAM(s) Oral daily  montelukast 10 milliGRAM(s) Oral at bedtime  nitrofurantoin macrocrystals (MACRODANTIN) 50 milliGRAM(s) Oral <User Schedule>  pantoprazole  Injectable 40 milliGRAM(s) IV Push two times a day  predniSONE   Tablet 1 milliGRAM(s) Oral four times a day  simvastatin 40 milliGRAM(s) Oral at bedtime  tamsulosin 0.4 milliGRAM(s) Oral at bedtime    MEDICATIONS  (PRN):      Allergies    cephalexin (Urticaria)  clindamycin (Diarrhea)  erythromycin (Other)  Methotrexate Sodium (Unknown)  penicillins (Hives)  sporalin (oxypsoralin/PUVA) (Other)  sulfa drugs (Rash)    Intolerances        REVIEW OF SYSTEMS:    CONSTITUTIONAL: No weakness, fevers or chills  EYES/ENT: No visual changes;  No vertigo or throat pain   NECK: No pain or stiffness  RESPIRATORY: No cough, wheezing, hemoptysis; No shortness of breath  CARDIOVASCULAR: No chest pain or palpitations  GASTROINTESTINAL: No abdominal pain. No nausea, vomiting, or hematemesis; No diarrhea or constipation. No melena or hematochezia.  GENITOURINARY: No dysuria, frequency or hematuria  NEUROLOGICAL: No numbness or weakness  SKIN: No itching or rash  All other review of systems is negative unless indicated above    VITAL SIGNS:   Vital Signs Last 24 Hrs  T(C): 36.7 (25 Jun 2020 11:10), Max: 36.7 (25 Jun 2020 11:10)  T(F): 98 (25 Jun 2020 11:10), Max: 98 (25 Jun 2020 11:10)  HR: 79 (25 Jun 2020 11:10) (79 - 79)  BP: 112/55 (25 Jun 2020 11:10) (112/55 - 112/55)  BP(mean): --  RR: 16 (25 Jun 2020 11:10) (16 - 16)  SpO2: 93% (25 Jun 2020 11:10) (93% - 93%)    I&O's Summary      On Exam:  Tele: sr  Constitutional: NAD, alert and oriented x 3  Lungs:  Non-labored, breath sounds are clear bilaterally, No wheezing, rales or rhonchi  Cardiovascular: RRR.  S1 and S2 positive.  + murmur No rubs, gallops or clicks   Gastrointestinal: Bowel Sounds present, soft, nontender.   Lymph: No peripheral edema. No cervical lymphadenopathy.  Neurological: Alert, no focal deficits  Skin: No rashes or ulcers   Psych:  Mood & affect appropriate.    LABS: All Labs Reviewed:                        7.5    13.44 )-----------( 334      ( 25 Jun 2020 11:48 )             25.2     25 Jun 2020 11:48    141    |  105    |  30     ----------------------------<  88     3.9     |  28     |  1.50     Ca    9.2        25 Jun 2020 11:48    TPro  6.5    /  Alb  3.1    /  TBili  0.3    /  DBili  x      /  AST  95     /  ALT  51     /  AlkPhos  107    25 Jun 2020 11:48    PT/INR - ( 25 Jun 2020 11:48 )   PT: 12.7 sec;   INR: 1.13 ratio         PTT - ( 25 Jun 2020 11:48 )  PTT:31.7 sec      Blood Culture:         RADIOLOGY:    EKG:    TTE:

## 2020-06-25 NOTE — H&P ADULT - PROBLEM SELECTOR PLAN 3
-chronic  -continue home dose _____with hold parameters -self catheterizes 5-6 times a day, will continue in the hospital  -home flomax .4 at [facility]   -nirofurantoin 50 mg capsule at bedtime at home, pharmacy unable to break the 100 mg capsules at home, pt's wife can bring in pt's home med -self catheterizes 5-6 times a day, will continue in the hospital  -home flomax .4 at bedtime   -nirofurantoin 50 mg capsule at bedtime at home, pharmacy unable to break the 100 mg capsules at home, pt's wife can bring in pt's home med -self catheterizes 5-6 times a day, will continue in the hospital  -home flomax .4mg at bedtime   -nirofurantoin 50 mg capsule at bedtime at home, pharmacy unable to break the 100 mg capsules at home, pt's wife can bring in pt's home med -self catheterizes 5-6 times a day, will continue in the hospital  -home flomax .4mg at bedtime   -nitrofurantoin 50 mg capsule at bedtime at home, pharmacy unable to break the 100 mg capsules at home, pt's wife can bring in pt's home med

## 2020-06-25 NOTE — H&P ADULT - ATTENDING COMMENTS
79 yo M presenting with low Hgb and + FOBT. PMH as stated above.   Patient denies any melena but has BRBPR at times. He states the BRBPR has been ongoing for greter than 6 months. He was last seen by his GI doctor, Dr. Carballo yesterday. Denies headaches, nausea, vomiting, chest pain, SOB, palpitations, abdominal pain, constipation, diarrhea, melena, dysuria.   T(C): 36.7 (06-25-20 @ 11:10), Max: 36.7 (06-25-20 @ 11:10)  HR: 79 (06-25-20 @ 11:10) (79 - 79)  BP: 112/55 (06-25-20 @ 11:10) (112/55 - 112/55)  RR: 16 (06-25-20 @ 11:10) (16 - 16)  SpO2: 93% (06-25-20 @ 11:10) (93% - 93%)  Wt(kg): --    Physical Exam:   GENERAL: well-groomed,  NAD  HEENT: head NC/AT; EOM intact, PERRLA, conjunctiva & sclera clear; hearing grossly intact, moist mucous membranes  NECK: supple, no JVD  RESPIRATORY: CTA B/L, no wheezing, rales, rhonchi or rubs  CARDIOVASCULAR: S1&S2, RRR, no murmurs or gallops  ABDOMEN: soft, non-tender, non-distended, + Bowel sounds x4 quadrants, no guarding, rebound or rigidity  MUSCULOSKELETAL:  no clubbing, cyanosis or edema of all 4 extremities  LYMPH: no cervical lymphadenopathy  VASCULAR: Radial pulses 2+ bilaterally, no varicose veins   SKIN: warm and dry, color normal  NEUROLOGIC: AA&O X3, CN2-12 intact w/ no focal deficits, no sensory loss, motor Strength 5/5 in UE & LE B/L  Psych: Normal mood and affect, normal behavior    Plan:   GIB: etiology unclear, followed by Dr. Carballo. Spoke with Dr. Carballo on the phone, he is aware of patients GIB, he recommended to transfuse as needed and he will schedule patient for outpatient EGD/colonoscopy.  -will continue Protonix IV BID for now.   -f/u repeat H/H post transfusion and for signs and symptoms of bleeding.    MARY: gentle IVF, hold nephrotoxic medications.     CAD: followed by Dr. Sung as outpatient. Not on ASA or Plavix. continue simvastatin    Leukocytosis: likely reactive. No signs of symptoms of acute infection    Hypothyroidism: continue synthroid  Remainder as above.  Left a voicemail for Dr. Boothe's office about patients admission.

## 2020-06-25 NOTE — ED PROVIDER NOTE - CCCP TRG CHIEF CMPLNT
Patient interviewed in pre-op. Patient verified his identity by name and date of birth consistent with his arm band. Patient verified surgical procedure consistent with surgical consent. Time given to patient to address any question or concerns . Patient had none at this time. abnormal lab result

## 2020-06-25 NOTE — H&P ADULT - ASSESSMENT
79 YO M with pmhx of CAD, HTN, HLD, prostate cancer admit for GIB. 79 YO M with pmhx of CAD, HTN, HLD, prostate cancer (seed radiation 2009), MI (nov 2000, angioplasty w 3 stents tot he right coronary artery), rheumatoid and psoriatic arthritis, plaque psoriasis, b/l hip replacements (left 1998, right 2002), C. diff in 2014, urinary retention with self catheretization hypothyroidism, mild asthma, melanoma in situ, admit for GIB. 79 YO M with pmhx of CAD, HTN, HLD, prostate cancer (seed radiation 2009), MI (nov 2000, angioplasty w 3 stents to the right coronary artery), rheumatoid and psoriatic arthritis, plaque psoriasis, b/l hip replacements (left 1998, right 2002), C. diff in 2014, urinary retention with self catheretization hypothyroidism, mild asthma, melanoma in situ, admit for GIB.

## 2020-06-25 NOTE — H&P ADULT - PROBLEM SELECTOR PLAN 5
-chronic  -continue home dose _____ CAD with Hx of MI in 2000   -continue home dose vasotec -chronic  -continue home dose toprol XL with hold parameters

## 2020-06-25 NOTE — CONSULT NOTE ADULT - ASSESSMENT
Lower GI bleed.  ? etiology. ?radiation proctitis.  Has had continuous bleeding with recent worsing. no improvement with fe replacement.. has previously refused PRBC transfusion but now agreeable    Recommendations:  1.  transfuse as clinically indicated  2.  follow CBC  3.  GI evaluation   4.  further heme recommendations pending above  discussed with patient

## 2020-06-25 NOTE — H&P ADULT - NSHPPHYSICALEXAM_GEN_ALL_CORE
Vital Signs Last 24 Hrs  T(C): 36.7 (25 Jun 2020 11:10), Max: 36.7 (25 Jun 2020 11:10)  T(F): 98 (25 Jun 2020 11:10), Max: 98 (25 Jun 2020 11:10)  HR: 79 (25 Jun 2020 11:10) (79 - 79)  BP: 112/55 (25 Jun 2020 11:10) (112/55 - 112/55)  BP(mean): --  RR: 16 (25 Jun 2020 11:10) (16 - 16)  SpO2: 93% (25 Jun 2020 11:10) (93% - 93%)    Physical Exam:  General: Well developed, well nourished, No Acute Distress  HEENT: Normocephallic Atraumatic, PERRLA, EOMI bl, moist mucous membranes   Neck: Supple, nontender, no cervical lymphadenopathy  Neurology: A&Ox3, no focal neurological deficits: CNII-XII intact  Respiratory: Clear To Auscultation B/L, No Wheezes, rhonchi or rales  CV: Regular Rate and Rhythm, +S1/S2, no murmurs, rubs or gallops  Abdominal: Soft, Non-Tender, Non-Distended +Bowel Soundsx4  Extremities: No Clubbing, cyanosis or edema, + peripheral pulses: cap refill <2 secs LE bilaterally  Skin: warm, dry, normal color Vital Signs Last 24 Hrs  T(C): 36.7 (25 Jun 2020 11:10), Max: 36.7 (25 Jun 2020 11:10)  T(F): 98 (25 Jun 2020 11:10), Max: 98 (25 Jun 2020 11:10)  HR: 79 (25 Jun 2020 11:10) (79 - 79)  BP: 112/55 (25 Jun 2020 11:10) (112/55 - 112/55)  BP(mean): --  RR: 16 (25 Jun 2020 11:10) (16 - 16)  SpO2: 93% (25 Jun 2020 11:10) (93% - 93%)    Physical Exam:  General: pale appearing, elderly, thin, no acute distress  HEENT: Normocephalic Atraumatic, moist mucous membranes   Neck: two residual melanoma in situ spots on left neck   Neurology: A&Ox3, no focal neurological deficits: CNII-XII intact  Respiratory: Clear To Auscultation B/L, No Wheezes, rhonchi or rales  CV: Regular Rate and Rhythm, +S1/S2, no murmurs, rubs or gallops  Abdominal: Soft, Non-Tender, Non-Distended +Bowel Soundsx4  Extremities: No Clubbing, cyanosis or edema, + peripheral pulses: cap refill <2 secs LE bilaterally  Skin: warm, dry, normal color Vital Signs Last 24 Hrs  T(C): 36.7 (25 Jun 2020 11:10), Max: 36.7 (25 Jun 2020 11:10)  T(F): 98 (25 Jun 2020 11:10), Max: 98 (25 Jun 2020 11:10)  HR: 79 (25 Jun 2020 11:10) (79 - 79)  BP: 112/55 (25 Jun 2020 11:10) (112/55 - 112/55)  BP(mean): --  RR: 16 (25 Jun 2020 11:10) (16 - 16)  SpO2: 93% (25 Jun 2020 11:10) (93% - 93%)    Physical Exam:  General: elderly, thin, no acute distress  HEENT: Normocephalic Atraumatic, moist mucous membranes   Neck: two residual melanoma in situ spots on left neck   Neurology: A&Ox3, no focal neurological deficits: CNII-XII intact  Respiratory: Clear To Auscultation B/L, No Wheezes, rhonchi or rales  CV: Regular Rate and Rhythm, +S1/S2, no murmurs, rubs or gallops  Abdominal: Soft, Non-Tender, Non-Distended +Bowel Soundsx4  Extremities: No Clubbing, cyanosis or edema, + peripheral pulses: cap refill <2 secs LE bilaterally  Skin: warm, dry, normal color

## 2020-06-25 NOTE — CONSULT NOTE ADULT - ASSESSMENT
MARY  GI bleeding  HTN  Anemia    -BLCr 0.9  -MARY likely 2/2 hypoxic/prerenal injury driven by GI bleeding  -Check UA  -Check Ur lytes  -Gentle IV  -PRCBs per primary  -BP well controlled at present MARY on CKD 2  GI bleeding  HTN  Anemia    -BLCr 0.9  -MARY likely 2/2 hypoxic/prerenal injury driven by GI bleeding  -Check UA  -Check Ur lytes  -Gentle IVF while NPO  -PRCBs per primary  -BP well controlled at present, hold HCTZ and ACEI MARY on CKD 2  GI bleeding  HTN  Anemia    -BLCr 0.9  -MARY likely 2/2 hypoxic/prerenal injury driven by GI bleeding  -Check UA  -Check Ur lytes  -Gentle IVF while NPO  -PRCBs per primary  -BP well controlled at present, hold HCTZ and ACEI  -Renal US if creatinine worsening

## 2020-06-25 NOTE — CONSULT NOTE ADULT - SUBJECTIVE AND OBJECTIVE BOX
Patient is a 80y old  Male who presents with a chief complaint of      HPI:       PAST MEDICAL & SURGICAL HISTORY:  Prostate CA  Hyperlipidemia  Hypertension  CAD (coronary artery disease)  Acute MI  No significant past surgical history       FAMILY HISTORY:  NC    Social History:Non smoker    MEDICATIONS  (STANDING):  pantoprazole  Injectable 40 milliGRAM(s) IV Push Once  pantoprazole  Injectable 40 milliGRAM(s) IV Push two times a day    MEDICATIONS  (PRN):   Meds reviewed    Allergies    clindamycin (Diarrhea)  penicillins (Hives)  sulfa drugs (Rash)    Intolerances         REVIEW OF SYSTEMS:    CONSTITUTIONAL:  No weight loss   EYES: No eye pain, visual disturbances, or discharge  ENMT:  No difficulty hearing, tinnitus, vertigo; No sinus or throat pain  NECK: No pain or stiffness  BREASTS: No pain, masses, or nipple discharge  RESPIRATORY: No SOB. No wheeze. No GEORGES  CARDIOVASCULAR: No chest pain, palpitations, dizziness,   GASTROINTESTINAL: No abdominal or epigastric pain. No nausea, vomiting, or hematemesis; No diarrhea or constipation. No melena or hematochezia.Trunckal obesity  GENITOURINARY: No dysuria, frequency, hematuria, or incontinence  NEUROLOGICAL: No headaches, memory loss, loss of strength, numbness, or tremors  SKIN: Diffuse erythema, no blisters  LYMPH NODES: No enlarged glands  ENDOCRINE: No heat or cold intolerance; No hair loss  MUSCULOSKELETAL: No joint pain or swelling   PSYCHIATRIC: No depression, anxiety, mood swings, or difficulty sleeping  HEME/LYMPH: No easy bruising, or bleeding gums  ALLERY AND IMMUNOLOGIC: No hives or eczema      Vital Signs Last 24 Hrs  T(C): 36.7 (25 Jun 2020 11:10), Max: 36.7 (25 Jun 2020 11:10)  T(F): 98 (25 Jun 2020 11:10), Max: 98 (25 Jun 2020 11:10)  HR: 79 (25 Jun 2020 11:10) (79 - 79)  BP: 112/55 (25 Jun 2020 11:10) (112/55 - 112/55)  BP(mean): --  RR: 16 (25 Jun 2020 11:10) (16 - 16)  SpO2: 93% (25 Jun 2020 11:10) (93% - 93%)  Daily Height in cm: 160.02 (25 Jun 2020 11:10)    Daily     PHYSICAL EXAM:    GENERAL: NAD  HEAD:  Atraumatic, Normocephalic  EYES: EOMI, PERRLA, conjunctiva and sclera clear  ENMT: No tonsillar erythema, exudates, or enlargement; Moist mucous membranes, Good dentition, No lesions  NECK: Supple, neck  veins full  NERVOUS SYSTEM:  Alert & Oriented X3, Good concentration; Motor Strength wnl upper and lower extremities  CHEST/LUNG: Clear to percussion bilaterally; No rales, rhonchi, wheezing, or rubs  HEART: Regular rate and rhythm; No murmurs, rubs, or gallops  ABDOMEN: Soft, Nontender, Nondistended; Bowel sounds present, body wall and flank edema  EXTREMITIES:  Edema  LYMPH: No lymphadenopathy noted  SKIN: No rashes or lesions, pale      LABS:                        7.5    13.44 )-----------( 334      ( 25 Jun 2020 11:48 )             25.2     06-25    141  |  105  |  30<H>  ----------------------------<  88  3.9   |  28  |  1.50<H>    Ca    9.2      25 Jun 2020 11:48    TPro  6.5  /  Alb  3.1<L>  /  TBili  0.3  /  DBili  x   /  AST  95<H>  /  ALT  51  /  AlkPhos  107  06-25    PT/INR - ( 25 Jun 2020 11:48 )   PT: 12.7 sec;   INR: 1.13 ratio         PTT - ( 25 Jun 2020 11:48 )  PTT:31.7 sec            RADIOLOGY & ADDITIONAL TESTS: Patient is a 80y old  Male who presents with a chief complaint of  GI bleeding    HPI:       PAST MEDICAL & SURGICAL HISTORY:  Prostate CA  Hyperlipidemia  Hypertension  CAD (coronary artery disease)  Acute MI  No significant past surgical history       FAMILY HISTORY:  NC    Social History:Non smoker    MEDICATIONS  (STANDING):  pantoprazole  Injectable 40 milliGRAM(s) IV Push Once  pantoprazole  Injectable 40 milliGRAM(s) IV Push two times a day    MEDICATIONS  (PRN):   Meds reviewed    Allergies    clindamycin (Diarrhea)  penicillins (Hives)  sulfa drugs (Rash)    Intolerances         REVIEW OF SYSTEMS:    CONSTITUTIONAL:  No weight loss   EYES: No eye pain, visual disturbances, or discharge  ENMT:  No difficulty hearing, tinnitus, vertigo; No sinus or throat pain  NECK: No pain or stiffness  BREASTS: No pain, masses, or nipple discharge  RESPIRATORY: No SOB. No wheeze. No GEORGES  CARDIOVASCULAR: No chest pain, palpitations, dizziness,   GASTROINTESTINAL: No abdominal or epigastric pain. No nausea, vomiting, or hematemesis; No diarrhea or constipation. + Gi bleeding  GENITOURINARY: No dysuria, frequency, hematuria, or incontinence  NEUROLOGICAL: No headaches, memory loss, loss of strength, numbness, or tremors  SKIN: Diffuse erythema, no blisters  LYMPH NODES: No enlarged glands  ENDOCRINE: No heat or cold intolerance; No hair loss  MUSCULOSKELETAL: No joint pain or swelling   PSYCHIATRIC: No depression, anxiety, mood swings, or difficulty sleeping  HEME/LYMPH: No easy bruising, or bleeding gums  ALLERY AND IMMUNOLOGIC: No hives or eczema      Vital Signs Last 24 Hrs  T(C): 36.7 (25 Jun 2020 11:10), Max: 36.7 (25 Jun 2020 11:10)  T(F): 98 (25 Jun 2020 11:10), Max: 98 (25 Jun 2020 11:10)  HR: 79 (25 Jun 2020 11:10) (79 - 79)  BP: 112/55 (25 Jun 2020 11:10) (112/55 - 112/55)  BP(mean): --  RR: 16 (25 Jun 2020 11:10) (16 - 16)  SpO2: 93% (25 Jun 2020 11:10) (93% - 93%)  Daily Height in cm: 160.02 (25 Jun 2020 11:10)    Daily     PHYSICAL EXAM:    GENERAL: NAD  HEAD:  Atraumatic, Normocephalic  EYES: EOMI, PERRLA, conjunctiva and sclera clear  ENMT: No tonsillar erythema, exudates, or enlargement; Moist mucous membranes, Good dentition, No lesions  NECK: Supple, neck  veins full  NERVOUS SYSTEM:  Alert & Oriented X3, Good concentration; Motor Strength wnl upper and lower extremities  CHEST/LUNG: Clear to percussion bilaterally; No rales, rhonchi, wheezing, or rubs  HEART: Regular rate and rhythm; No murmurs, rubs, or gallops  ABDOMEN: Soft, Nontender, Nondistended; Bowel sounds present, body wall and flank edema  EXTREMITIES:  Edema  LYMPH: No lymphadenopathy noted  SKIN: No rashes or lesions, pale      LABS:                        7.5    13.44 )-----------( 334      ( 25 Jun 2020 11:48 )             25.2     06-25    141  |  105  |  30<H>  ----------------------------<  88  3.9   |  28  |  1.50<H>    Ca    9.2      25 Jun 2020 11:48    TPro  6.5  /  Alb  3.1<L>  /  TBili  0.3  /  DBili  x   /  AST  95<H>  /  ALT  51  /  AlkPhos  107  06-25    PT/INR - ( 25 Jun 2020 11:48 )   PT: 12.7 sec;   INR: 1.13 ratio         PTT - ( 25 Jun 2020 11:48 )  PTT:31.7 sec            RADIOLOGY & ADDITIONAL TESTS: Patient is a 80y old  Male who presents with a chief complaint of  GI bleeding    HPI: Hypotonic Bladder, HTN, HLD, Prostate Cancer presents with GI bleeding and fatigue/weakness.  Patient has been having ongoing issues with GI bleeding and was scheduled to have oupt endoscopy, however was delayed due to covid 19.  He presents with Fatigue.  He chronically straight caths himself due to urinary retention for 12 years and has followed with urology.  He states he has been straight cathing with normal volume, but has been having more frequency.  He denies dysuria/SOB/CP/Dizziness/N/V.  Saw Dr. Carrasco-nephrologist sometime ago.        PAST MEDICAL & SURGICAL HISTORY:  Prostate CA  Hyperlipidemia  Hypertension  CAD (coronary artery disease)  Acute MI  No significant past surgical history       FAMILY HISTORY:  NC    Social History:Non smoker    MEDICATIONS  (STANDING):  pantoprazole  Injectable 40 milliGRAM(s) IV Push Once  pantoprazole  Injectable 40 milliGRAM(s) IV Push two times a day    MEDICATIONS  (PRN):   Meds reviewed    Allergies    clindamycin (Diarrhea)  penicillins (Hives)  sulfa drugs (Rash)    Intolerances         REVIEW OF SYSTEMS:    CONSTITUTIONAL:  Fatigue   EYES: No eye pain, visual disturbances, or discharge  ENMT:  No difficulty hearing, tinnitus, vertigo; No sinus or throat pain  NECK: No pain or stiffness  BREASTS: No pain, masses, or nipple discharge  RESPIRATORY: No SOB. No wheeze. No GEORGES  CARDIOVASCULAR: No chest pain, palpitations, dizziness,   GASTROINTESTINAL: No abdominal or epigastric pain. No nausea, vomiting, or hematemesis; No diarrhea or constipation. + Gi bleeding  GENITOURINARY: No dysuria, frequency, hematuria, or incontinence  NEUROLOGICAL: No headaches, memory loss, loss of strength, numbness, or tremors  SKIN: Diffuse erythema, no blisters  LYMPH NODES: No enlarged glands  ENDOCRINE: No heat or cold intolerance; No hair loss  MUSCULOSKELETAL: No joint pain or swelling   PSYCHIATRIC: No depression, anxiety, mood swings, or difficulty sleeping  HEME/LYMPH: No easy bruising, or bleeding gums  ALLERY AND IMMUNOLOGIC: No hives or eczema      Vital Signs Last 24 Hrs  T(C): 36.7 (25 Jun 2020 11:10), Max: 36.7 (25 Jun 2020 11:10)  T(F): 98 (25 Jun 2020 11:10), Max: 98 (25 Jun 2020 11:10)  HR: 79 (25 Jun 2020 11:10) (79 - 79)  BP: 112/55 (25 Jun 2020 11:10) (112/55 - 112/55)  BP(mean): --  RR: 16 (25 Jun 2020 11:10) (16 - 16)  SpO2: 93% (25 Jun 2020 11:10) (93% - 93%)  Daily Height in cm: 160.02 (25 Jun 2020 11:10)    Daily     PHYSICAL EXAM:    GENERAL: NAD  HEAD:  Atraumatic, Normocephalic  EYES: EOMI, PERRLA, conjunctiva and sclera clear  ENMT: No tonsillar erythema, exudates, or enlargement; Moist mucous membranes, Good dentition, No lesions  NECK: Supple, neck  veins full  NERVOUS SYSTEM:  Alert & Oriented X3, Good concentration; Motor Strength wnl upper and lower extremities  CHEST/LUNG: Clear to percussion bilaterally; No rales, rhonchi, wheezing, or rubs  HEART: Regular rate and rhythm; No murmurs, rubs, or gallops  ABDOMEN: Soft, Nontender, Nondistended; Bowel sounds present,  EXTREMITIES:  +1 Edema  SKIN: No rashes or lesions, pale      LABS:                        7.5    13.44 )-----------( 334      ( 25 Jun 2020 11:48 )             25.2     06-25    141  |  105  |  30<H>  ----------------------------<  88  3.9   |  28  |  1.50<H>    Ca    9.2      25 Jun 2020 11:48    TPro  6.5  /  Alb  3.1<L>  /  TBili  0.3  /  DBili  x   /  AST  95<H>  /  ALT  51  /  AlkPhos  107  06-25    PT/INR - ( 25 Jun 2020 11:48 )   PT: 12.7 sec;   INR: 1.13 ratio         PTT - ( 25 Jun 2020 11:48 )  PTT:31.7 sec            RADIOLOGY & ADDITIONAL TESTS:

## 2020-06-26 NOTE — PROGRESS NOTE ADULT - ASSESSMENT
Lower GI bleed.  ? etiology. ?radiation proctitis. (brachytherapy seeds, per tp)     Has had continuous bleeding with recent worsening.   no improvement with fe replacement.. has previously refused PRBC transfusion but now agreeable    Ferritin increased likely due to recent iron tx    rcvd 1U yest.   s/p 2nd unit PRBC today      Recommendations:  1.  transfuse as clinically indicated  2.  follow CBC  3.  GI evaluation, to bee seen by Dr Josiane Briceño per pt   4.  further heme recommendations pending above  discussed with patient

## 2020-06-26 NOTE — PROGRESS NOTE ADULT - SUBJECTIVE AND OBJECTIVE BOX
[INTERVAL HX: ]  Patient seen and examined;  Chart reviewed and events noted;   no CP, no SOB.   c/o diff sleeping in ER, and RLS.       Patient is a 80y Male with a known history of :  Gastrointestinal hemorrhage (K92.2) [Active]  Asthma (J45.909) [Active]  Melanoma in situ (D03.9) [Active]  Hypothyroid (E03.9) [Active]  H/O Clostridium difficile infection (Z86.19) [Active]  Urinary retention (R33.9) [Active]  Plaque psoriasis (L40.0) [Active]  History of urinary self-catheterization (Z78.9) [Active]  Arthritis (M19.90) [Active]  Prostate CA (C61) [Active]  Hyperlipidemia (E78.5) [Active]  Hypertension (I10) [Active]  CAD (coronary artery disease) (I25.10) [Active]  Acute MI (I21.3) [Active]  S/P tonsillectomy (Z90.89) [Active]  H/O hernia repair (Z98.890) [Active]  S/P cardiac cath (Z98.890) [Active]  No significant past surgical history ([id number]) [Inactive]    HPI:  81 YO M with pmhx of CAD, HTN, HLD, prostate cancer (seed radiation 2009), MI (nov 2000, angioplasty w 3 stents to the right coronary artery), rheumatoid and psoriatic arthritis, plaque psoriasis, b/l hip replacements (left 1998, right 2002), C diff in 2014, urinary retention with self catheretization hypothyroidism, mild asthma, melanoma in situ presents for GIB. Pt states he noticed right red blood in his stools for 6 months. Pt has been feeling weakness, weight loss of 19 pounds since June 2019. Pt sent in by outpatient hematologist Dr. Bergeron given low Hgb outpatient. (6.8 on 6/24) Pt denies SOB or GEORGES, however feels weak when pushing heavy objects. Pt was scheduled with his GI doctor Dr. Mullen for outpatient colonoscopy in March 2020. However, colonoscopy canceled due to COVID19. Pt self catheterizes 5-6 times a day. Pt admits to burning prior to catheterization when his bladder is full. Pt denies fever, chills, hematemesis, hemoptysis, hematuria, melena, SOB, GEORGES, CP, abdominal pain.     ED vitals significant for temp 98 F, HR 79, /55, RR 16, 93% on room air. Pt's labs significant for +FOBT , wbc 13, Hgb 7.5, hct 25.2, 94% neutrophils, BUN 30, Cr 1.5, AST 95. Pt type and screen performed. CXR showed no acute abnormalities. Pt put on remote tele, made NPO, s/p pantoprazole 40 IV, famotidine 20 IV. 1 unit of pRBCs pending. EKG showed rate 69, NSR, LVH, LAD. (25 Jun 2020 13:46)        MEDICATIONS  (STANDING):  ferrous    sulfate 325 milliGRAM(s) Oral daily  finasteride 5 milliGRAM(s) Oral daily  fluticasone propionate 50 MICROgram(s)/spray Nasal Spray 2 Spray(s) Both Nostrils <User Schedule>  levothyroxine 25 MICROGram(s) Oral daily  loratadine 10 milliGRAM(s) Oral daily  metoprolol succinate  milliGRAM(s) Oral daily  montelukast 10 milliGRAM(s) Oral at bedtime  nitrofurantoin macrocrystals (MACRODANTIN) 50 milliGRAM(s) Oral <User Schedule>  pantoprazole  Injectable 40 milliGRAM(s) IV Push two times a day  potassium chloride   Powder 40 milliEquivalent(s) Oral two times a day  potassium phosphate / sodium phosphate Tablet (K-PHOS No. 2) 1 Tablet(s) Oral four times a day with meals  predniSONE   Tablet 1 milliGRAM(s) Oral four times a day  simvastatin 40 milliGRAM(s) Oral at bedtime  tamsulosin 0.4 milliGRAM(s) Oral at bedtime    MEDICATIONS  (PRN):      Vital Signs Last 24 Hrs  T(C): 36.7 (26 Jun 2020 10:29), Max: 37.8 (26 Jun 2020 02:27)  T(F): 98 (26 Jun 2020 10:29), Max: 100.1 (26 Jun 2020 02:27)  HR: 61 (26 Jun 2020 10:29) (61 - 74)  BP: 128/67 (26 Jun 2020 10:29) (119/66 - 151/63)  BP(mean): --  RR: 17 (26 Jun 2020 10:29) (16 - 18)  SpO2: 97% (26 Jun 2020 10:29) (94% - 99%)      [PHYSICAL EXAM]  General: adult in NAD,  WN,  WD. pale  HEENT: clear oropharynx, anicteric sclera, pink conjunctivae.  Neck: supple, no masses.  CV: normal S1S2, no murmur, no rubs, no gallops.  Lungs: clear to auscultation, no wheezes, no rales, no rhonchi.  Abdomen: soft, non-tender, non-distended, no hepatosplenomegaly, normal BS, no guarding.  Ext: no clubbing, no cyanosis, no edema.  Skin: no rashes,  no petechiae, no venous stasis changes.  Neuro: alert and oriented X3  , no focal motor deficits.  LN: no SC KEVEN.      [LABS:]                        7.4    11.96 )-----------( 263      ( 26 Jun 2020 07:17 )             23.3     06-26    141  |  106  |  20  ----------------------------<  118<H>  3.1<L>   |  25  |  1.00    Ca    8.4<L>      26 Jun 2020 07:17  Phos  1.8     06-26  Mg     1.6     06-26    TPro  6.5  /  Alb  3.1<L>  /  TBili  0.3  /  DBili  x   /  AST  95<H>  /  ALT  51  /  AlkPhos  107  06-25    PT/INR - ( 25 Jun 2020 11:48 )   PT: 12.7 sec;   INR: 1.13 ratio         PTT - ( 25 Jun 2020 11:48 )  PTT:31.7 sec      Ferritin, Serum in AM (06.26.20 @ 11:00)    Ferritin, Serum: 645 ng/mL    Iron with Total Binding Capacity in AM (06.26.20 @ 11:00)    Iron - Total Binding Capacity.: 209 ug/dL    % Saturation, Iron: 6 %    Iron Total, Serum: 13 ug/dL    Unsaturated Iron Binding Capacity: 196 ug/dL          [RADIOLOGY STUDIES:]

## 2020-06-26 NOTE — PROGRESS NOTE ADULT - ASSESSMENT
81 YO M with pmhx of CAD, HTN, HLD, prostate cancer (seed radiation 2009), MI (nov 2000, angioplasty w 3 stents to the right coronary artery), rheumatoid and psoriatic arthritis, plaque psoriasis, b/l hip replacements (left 1998, right 2002), C. diff in 2014, urinary retention with self catheretization hypothyroidism, mild asthma, melanoma in situ, admitted for GIB

## 2020-06-26 NOTE — PROGRESS NOTE ADULT - ASSESSMENT
79y/o M PMH of CAD, HTN, HLD, prostate cancer (seed radiation 2009), MI (nov 2000, angioplasty w 3 stents to the right coronary artery), rheumatoid and psoriatic arthritis, plaque psoriasis, b/l hip replacements (left 1998, right 2002), C diff in 2014, urinary retention with self catheretization hypothyroidism, mild asthma, melanoma in situ presents for GIB. Pt states he noticed right red blood in his stools for 6 months. Pt has been feeling weakness, weight loss of 19 pounds since June 2019. Pt sent in by outpatient hematologist Dr. Bergeron given low Hgb outpatient. (6.8 on 6/24) Pt denies SOB or GEORGES, however feels weak when pushing heavy objects. Pt was scheduled with his GI doctor Dr. Mullen for outpatient colonoscopy in March 2020. However, colonoscopy canceled due to COVID19. Pt self catheterizes 5-6 times a day.    CARDIAC OPTIMIZATION/CLEARANCE:  - there is no evidence of acute ischemia.  - EKG is unchanged when compared to prior  - pt w/o anginal complaints  - there is no evidence of significant arrhythmia.  - there is no evidence for meaningful  volume overload.  - TTE 12/1/18 mild AS, LVD,45 %, no need to repeat  -Patient is optimized for possible colonoscopy procedure    CAD S/P stent  - Stable  - CW BB at home dose  - CW Statin     HTN  - Controlled  - CW BB at home dose  - Norvasc can be added if tighter BP control needed.  -BUN and creatinine are normalized   -hold HCTZ in setting of GI Bleed, resume prior to discharge  - monitor routine hemodynamics.     HLD  - CW statin    Anemia  - patient remains anemic  -He   - S/P PRBCs  - trend CBC  - Would transfuse to keep Hb>8 and watch volume status     MARY  -creatinine downtrended from 1.50 to 1.0  -holding HCTZ  for now, resume prior to discharge      Will continue to follow

## 2020-06-26 NOTE — PROGRESS NOTE ADULT - SUBJECTIVE AND OBJECTIVE BOX
Patient is a 80y old  Male who presents with a chief complaint of GI bleeding (2020 14:08)      INTERVAL HPI: Pt seen and examined at bedside, in NAD. Denies CP, SOB, palpitations, abdominal pain, N/V/D, urinary complaints. No overnight events. Pt has not had any bleeding per rectum since yesterday. Tolerating PO diet,.   OVERNIGHT EVENTS: s/p 1 UNIT PRBC    T(F): 97.9 (20 @ 05:07), Max: 100.1 (20 @ 02:27)  HR: 68 (20 @ 05:07) (68 - 79)  BP: 142/64 (20 @ 05:07) (112/55 - 151/63)  RR: 18 (20 @ 05:07) (16 - 18)  SpO2: 96% (20 @ 05:07) (93% - 98%)  I&O's Summary    2020 07:01  -  2020 07:00  --------------------------------------------------------  IN: 0 mL / OUT: 450 mL / NET: -450 mL        REVIEW OF SYSTEMS:  CONSTITUTIONAL: No fever, weight loss, or fatigue  EYES: No eye pain, visual disturbances, or discharge  ENMT:  No difficulty hearing, tinnitus, vertigo; No sinus or throat pain  NECK: No pain or stiffness  BREASTS: No pain, masses, or nipple discharge  RESPIRATORY: No cough, wheezing, chills or hemoptysis; No shortness of breath  CARDIOVASCULAR: No chest pain, palpitations, dizziness, or leg swelling  GASTROINTESTINAL: No abdominal or epigastric pain. No nausea, vomiting, or hematemesis; No diarrhea or constipation. No melena or hematochezia overnight  GENITOURINARY: No dysuria, frequency, hematuria, or incontinence  NEUROLOGICAL: No headaches, memory loss, loss of strength, numbness, or tremors  SKIN: No itching, burning, rashes, or lesions   LYMPH NODES: No enlarged glands  ENDOCRINE: No heat or cold intolerance; No hair loss  MUSCULOSKELETAL: No joint pain or swelling; No muscle, back, or extremity pain  PSYCHIATRIC: No depression, anxiety, mood swings, or difficulty sleeping  HEME/LYMPH: No easy bruising, or bleeding gums  ALLERY AND IMMUNOLOGIC: No hives or eczema    PHYSICAL EXAM:  GENERAL: NAD, pale appearing elderly male  HEAD:  Atraumatic, Normocephalic  EYES: EOMI, PERRLA, conjunctival pallor  NERVOUS SYSTEM:  Alert & Oriented X3, Good concentration; moving all ext normal strength 5/5 upper and lower ext  CHEST/LUNG: Clear to percussion bilaterally; No rales, rhonchi, wheezing, or rubs  HEART: Regular rate and rhythm; No murmurs, rubs, or gallops  ABDOMEN: Soft, Nontender, Nondistended; Bowel sounds present  EXTREMITIES:  2+ Peripheral Pulses, No clubbing, cyanosis, or edema  SKIN: warm well perfused    LABS:                        7.4    11.96 )-----------( 263      ( 2020 07:17 )             23.3     06-26    141  |  106  |  20  ----------------------------<  118<H>  3.1<L>   |  25  |  1.00    Ca    8.4<L>      2020 07:17    TPro  6.5  /  Alb  3.1<L>  /  TBili  0.3  /  DBili  x   /  AST  95<H>  /  ALT  51  /  AlkPhos  107  06-25    PT/INR - ( 2020 11:48 )   PT: 12.7 sec;   INR: 1.13 ratio         PTT - ( 2020 11:48 )  PTT:31.7 sec  Urinalysis Basic - ( 2020 16:55 )    Color: Yellow / Appearance: Clear / S.005 / pH: x  Gluc: x / Ketone: Negative  / Bili: Negative / Urobili: Negative   Blood: x / Protein: 30 mg/dL / Nitrite: Negative   Leuk Esterase: Negative / RBC: x / WBC x   Sq Epi: x / Non Sq Epi: x / Bacteria: x      CAPILLARY BLOOD GLUCOSE                  MEDICATIONS  (STANDING):  ferrous    sulfate 325 milliGRAM(s) Oral daily  finasteride 5 milliGRAM(s) Oral daily  fluticasone propionate 50 MICROgram(s)/spray Nasal Spray 2 Spray(s) Both Nostrils <User Schedule>  levothyroxine 25 MICROGram(s) Oral daily  loratadine 10 milliGRAM(s) Oral daily  metoprolol succinate  milliGRAM(s) Oral daily  montelukast 10 milliGRAM(s) Oral at bedtime  nitrofurantoin macrocrystals (MACRODANTIN) 50 milliGRAM(s) Oral <User Schedule>  pantoprazole  Injectable 40 milliGRAM(s) IV Push two times a day  predniSONE   Tablet 1 milliGRAM(s) Oral four times a day  simvastatin 40 milliGRAM(s) Oral at bedtime  tamsulosin 0.4 milliGRAM(s) Oral at bedtime    MEDICATIONS  (PRN): Patient is a 80y old  Male who presents with a chief complaint of GI bleeding (2020 14:08)      INTERVAL HPI: Pt seen and examined at bedside, in NAD. Denies CP, SOB, palpitations, abdominal pain, N/V/D, urinary complaints. No overnight events. Pt has not had any bleeding per rectum since yesterday. Tolerating PO diet,.   OVERNIGHT EVENTS: s/p 1 UNIT PRBC    T(F): 97.9 (20 @ 05:07), Max: 100.1 (20 @ 02:27)  HR: 68 (20 @ 05:07) (68 - 79)  BP: 142/64 (20 @ 05:07) (112/55 - 151/63)  RR: 18 (20 @ 05:07) (16 - 18)  SpO2: 96% (20 @ 05:07) (93% - 98%)  I&O's Summary    2020 07:01  -  2020 07:00  --------------------------------------------------------  IN: 0 mL / OUT: 450 mL / NET: -450 mL        REVIEW OF SYSTEMS:  CONSTITUTIONAL: No fever, weight loss, or fatigue  RESPIRATORY: No cough, wheezing, chills or hemoptysis; No shortness of breath  CARDIOVASCULAR: No chest pain, palpitations, dizziness, or leg swelling  GASTROINTESTINAL: No abdominal or epigastric pain. No nausea, vomiting, or hematemesis; No diarrhea or constipation. No melena or hematochezia overnight  GENITOURINARY: No dysuria, frequency, hematuria, or incontinence  NEUROLOGICAL: No headaches, memory loss, loss of strength, numbness, or tremors  SKIN: No itching, burning, rashes, or lesions   MUSCULOSKELETAL: No joint pain or swelling; No muscle, back, or extremity pain  PSYCHIATRIC: No depression, anxiety, mood swings, or difficulty sleeping      PHYSICAL EXAM:  GENERAL: NAD, pale appearing elderly male  HEAD:  Atraumatic, Normocephalic  EYES: EOMI, PERRLA, conjunctival pallor  NERVOUS SYSTEM:  Alert & Oriented X3, Good concentration; moving all ext normal strength 4/5 upper and lower ext  CHEST/LUNG: Clear to percussion bilaterally; No rales, rhonchi, wheezing, or rubs  HEART: Regular rate and rhythm; No murmurs, rubs, or gallops  ABDOMEN: Soft, Nontender, Nondistended; Bowel sounds present  EXTREMITIES:  2+ Peripheral Pulses, No clubbing, cyanosis, or edema  SKIN: warm well perfused    LABS:                        7.4    11.96 )-----------( 263      ( 2020 07:17 )             23.3     06-26    141  |  106  |  20  ----------------------------<  118<H>  3.1<L>   |  25  |  1.00    Ca    8.4<L>      2020 07:17    TPro  6.5  /  Alb  3.1<L>  /  TBili  0.3  /  DBili  x   /  AST  95<H>  /  ALT  51  /  AlkPhos  107  06-25    PT/INR - ( 2020 11:48 )   PT: 12.7 sec;   INR: 1.13 ratio         PTT - ( 2020 11:48 )  PTT:31.7 sec  Urinalysis Basic - ( 2020 16:55 )    Color: Yellow / Appearance: Clear / S.005 / pH: x  Gluc: x / Ketone: Negative  / Bili: Negative / Urobili: Negative   Blood: x / Protein: 30 mg/dL / Nitrite: Negative   Leuk Esterase: Negative / RBC: x / WBC x   Sq Epi: x / Non Sq Epi: x / Bacteria: x      CAPILLARY BLOOD GLUCOSE                  MEDICATIONS  (STANDING):  ferrous    sulfate 325 milliGRAM(s) Oral daily  finasteride 5 milliGRAM(s) Oral daily  fluticasone propionate 50 MICROgram(s)/spray Nasal Spray 2 Spray(s) Both Nostrils <User Schedule>  levothyroxine 25 MICROGram(s) Oral daily  loratadine 10 milliGRAM(s) Oral daily  metoprolol succinate  milliGRAM(s) Oral daily  montelukast 10 milliGRAM(s) Oral at bedtime  nitrofurantoin macrocrystals (MACRODANTIN) 50 milliGRAM(s) Oral <User Schedule>  pantoprazole  Injectable 40 milliGRAM(s) IV Push two times a day  predniSONE   Tablet 1 milliGRAM(s) Oral four times a day  simvastatin 40 milliGRAM(s) Oral at bedtime  tamsulosin 0.4 milliGRAM(s) Oral at bedtime    MEDICATIONS  (PRN):

## 2020-06-26 NOTE — PROGRESS NOTE ADULT - SUBJECTIVE AND OBJECTIVE BOX
[facility] -- Noe Land, Jamel Sung, Yuri Ruvalcaba Savella, Goodger  Office # [phone]    Follow Up:    GI bleed  Subjective/Observations:   Patient seen and examined. He is very pleasant, and enjoys conversation. Patient shows interest in getting a GI work up here, and would like colonoscopy prior to discharge. He is concerned about going home and having more bleeding. He has no complaints of chest pain or shortness of breath.    REVIEW OF SYSTEMS: All other review of systems is negative unless indicated above  PAST MEDICAL & SURGICAL HISTORY:  Asthma  Melanoma in situ  Hypothyroid  H/O Clostridium difficile infection  Urinary retention  Plaque psoriasis  History of urinary self-catheterization  Arthritis  Prostate CA: seed therapy aug 2009  Hyperlipidemia  Hypertension  CAD (coronary artery disease)  Acute MI  S/P tonsillectomy  H/O hernia repair  S/P cardiac cath    MEDICATIONS  (STANDING):  ferrous    sulfate 325 milliGRAM(s) Oral daily  finasteride 5 milliGRAM(s) Oral daily  fluticasone propionate 50 MICROgram(s)/spray Nasal Spray 2 Spray(s) Both Nostrils <User Schedule>  levothyroxine 25 MICROGram(s) Oral daily  loratadine 10 milliGRAM(s) Oral daily  metoprolol succinate  milliGRAM(s) Oral daily  montelukast 10 milliGRAM(s) Oral at bedtime  nitrofurantoin macrocrystals (MACRODANTIN) 50 milliGRAM(s) Oral <User Schedule>  pantoprazole  Injectable 40 milliGRAM(s) IV Push two times a day  potassium chloride   Powder 40 milliEquivalent(s) Oral two times a day  potassium phosphate / sodium phosphate Tablet (K-PHOS No. 2) 1 Tablet(s) Oral four times a day with meals  predniSONE   Tablet 1 milliGRAM(s) Oral four times a day  simvastatin 40 milliGRAM(s) Oral at bedtime  tamsulosin 0.4 milliGRAM(s) Oral at bedtime    MEDICATIONS  (PRN):    Allergies    cephalexin (Urticaria)  clindamycin (Diarrhea)  erythromycin (Other)  Methotrexate Sodium (Unknown)  penicillins (Hives)  sporalin (oxypsoralin/PUVA) (Other)  sulfa drugs (Rash)    Intolerances      Vital Signs Last 24 Hrs  T(C): 36.7 (26 Jun 2020 10:29), Max: 37.8 (26 Jun 2020 02:27)  T(F): 98 (26 Jun 2020 10:29), Max: 100.1 (26 Jun 2020 02:27)  HR: 61 (26 Jun 2020 10:29) (61 - 74)  BP: 128/67 (26 Jun 2020 10:29) (119/66 - 151/63)  BP(mean): --  RR: 17 (26 Jun 2020 10:29) (16 - 18)  SpO2: 97% (26 Jun 2020 10:29) (94% - 99%)  I&O's Summary    25 Jun 2020 07:01  -  26 Jun 2020 07:00  --------------------------------------------------------  IN: 0 mL / OUT: 450 mL / NET: -450 mL        PHYSICAL EXAM:  TELE: normal sinus rhythm  Constitutional: NAD, awake and alert, well-developed  HEENT: Moist Mucous Membranes, Anicteric  Pulmonary: breathing is labored, crackles at the bases  Cardiovascular: Regular, S1 and S2, No murmurs, rubs, gallops or clicks  Gastrointestinal: Bowel Sounds present, soft, nontender.   Lymph: No peripheral edema. No lymphadenopathy.  Skin: No visible rashes or ulcers.  Psych:  Mood & affect appropriate  LABS: All Labs Reviewed:                        7.4    11.96 )-----------( 263      ( 26 Jun 2020 07:17 )             23.3                         7.7    x     )-----------( x        ( 25 Jun 2020 18:21 )             24.6                         7.5    13.44 )-----------( 334      ( 25 Jun 2020 11:48 )             25.2     26 Jun 2020 07:17    141    |  106    |  20     ----------------------------<  118    3.1     |  25     |  1.00   25 Jun 2020 11:48    141    |  105    |  30     ----------------------------<  88     3.9     |  28     |  1.50     Ca    8.4        26 Jun 2020 07:17  Ca    9.2        25 Jun 2020 11:48  Phos  1.8       26 Jun 2020 07:17  Mg     1.6       26 Jun 2020 07:17    TPro  6.5    /  Alb  3.1    /  TBili  0.3    /  DBili  x      /  AST  95     /  ALT  51     /  AlkPhos  107    25 Jun 2020 11:48    PT/INR - ( 25 Jun 2020 11:48 )   PT: 12.7 sec;   INR: 1.13 ratio         PTT - ( 25 Jun 2020 11:48 )  PTT:31.7 sec     < from: 12 Lead ECG (06.25.20 @ 12:09) >  Ventricular Rate 69 BPM    Atrial Rate 69 BPM    P-R Interval 134 ms    QRS Duration 90 ms    Q-T Interval 440 ms    QTC Calculation(Bezet) 471 ms    P Axis 61 degrees    R Axis -31 degrees    T Axis 133 degrees    Diagnosis Line Normal sinus rhythm  Left axis deviation  Left ventricular hypertrophy with repolarization abnormality  Inferior infarct (cited on or before 29-MAR-2010)  Nonspecific ST abnormality  Abnormal ECG  Confirmed by francois Kaplan ([id number]) on 6/25/2020 2:14:26 PM    < end of copied text >    Eliza Pruett, ALEX   #[id number] [phone]

## 2020-06-26 NOTE — DIETITIAN INITIAL EVALUATION ADULT. - PROBLEM SELECTOR PLAN 10
dvt ppx SCDs in setting of GI bleed  ppx meds: pt takes vitamin B complex, folic acid, citracal, vit D  -spoke with patient- will hold while in the hospital   -allergies: cont home dose xyzal, flonase    -lower extremity edema: with no LE edema on exam, hold HCTZ as per nephro and given GIB with well controlled BP    -hyperglyceridemia: cont fenofibrate    -mild asthma: cont monteleukast  IMPROVE VTE Individual Risk Assessment        RISK                                                          Points  [  ] Previous VTE                                                3  [  ] Thrombophilia                                             2  [  ] Lower limb paralysis                                   2        (unable to hold up >15 seconds)    [ 2 ] Current Cancer                                            2         (within 6 months)  [  ] Immobilization > 24 hrs                              1  [  ] ICU/CCU stay > 24 hours                            1  [ 1 ] Age > 60                                                    1  IMPROVE VTE Score ____3_____

## 2020-06-26 NOTE — DIETITIAN INITIAL EVALUATION ADULT. - PROBLEM SELECTOR PLAN 9
-plaque psoriasis, psoriatic and rheumatoid arthritis  -cont home dose prednisone  -pt may bring in fluocinonide cream

## 2020-06-26 NOTE — DIETITIAN INITIAL EVALUATION ADULT. - PROBLEM SELECTOR PLAN 3
-self catheterizes 5-6 times a day, will continue in the hospital  -home flomax .4mg at bedtime   -nitrofurantoin 50 mg capsule at bedtime at home, pharmacy unable to break the 100 mg capsules at home, pt's wife can bring in pt's home med

## 2020-06-26 NOTE — DIETITIAN INITIAL EVALUATION ADULT. - PROBLEM SELECTOR PLAN 2
-MARY on CKD2 likely 2/2 hypoxic prerenal injury, driven by GI bleeding  -UA  -Urine lytes  -continue IVF and monitor volume status  -hold HCTZ, ACEI as per nephro   -Renal US if creatinine worsening  -nephro consulted Salvatore Linda

## 2020-06-26 NOTE — DIETITIAN INITIAL EVALUATION ADULT. - PROBLEM SELECTOR PLAN 1
-GI bleed complicated with anemia, pt with hx of iron deficiency anemia, bright red blood per rectum since may 2019  -admit to medicine with remote tele   -s/p pantoprazole 40 IV, famotidine 20 IV  -continue IV protonix BID   -Hgb 7.5, +FOBT   -type and screen completed  -1 unit pRBCs in progress  -remote tele   -NPO, IVF   -f/u H/H at 18:00 and 08:00   -cont PO iron qD, pt also received iron infusions every 4 weeks   -f/u iron panel   -consulted and spoke with GI Dr. Rajesh Carballo: (pt's outpatient GI doctor). Dr. Carballo would like pt to receive blood as needed to correct his anemia, and follow up outpatient for colonoscopy and endoscopy.    -consult cardio Dr. Sung

## 2020-06-26 NOTE — ED ADULT NURSE REASSESSMENT NOTE - NS ED NURSE REASSESS COMMENT FT1
pt stable with no c/o voiced at present vital signs stable awaiting covid result  and whitney assignment
pt with no c/o at present pt self straight cath with 600 mls of urine output and medicated as ordered vital signs stable pt admitted covid 19 negative report given and transfer to floor

## 2020-06-26 NOTE — DIETITIAN INITIAL EVALUATION ADULT. - PHYSICAL APPEARANCE
underweight/other (specify) Nutrition focused physical exam conducted - found signs of malnutrition [ ]absent [X]present   Subcutaneous fat loss: [X] Orbital fat pads region (mild), [X]Buccal fat region (mild), [x]Triceps region (moderate) Muscle wasting: [x]Temples region (mild), [X]Clavicle region (moderate), [x]Shoulder region (severe), [X]Scapula region (moderate), [X]Interosseous region (severe) [X]thigh region (mild), [X]Calf region (moderate)

## 2020-06-26 NOTE — CHART NOTE - NSCHARTNOTEFT_GEN_A_CORE
Upon Nutritional Assessment by the Registered Dietitian your patient was determined to meet criteria / has evidence of the following diagnosis/diagnoses:          [ ]  Mild Protein Calorie Malnutrition        [X]  Moderate Protein Calorie Malnutrition        [ ] Severe Protein Calorie Malnutrition        [ ] Unspecified Protein Calorie Malnutrition        [ ] Underweight / BMI <19        [ ] Morbid Obesity / BMI > 40      Findings as based on:  [X] Comprehensive nutrition assessment   [X] Nutrition Focused Physical Exam; Nutrition focused physical exam conducted - found signs of malnutrition [ ]absent [X]present   Subcutaneous fat loss: [X] Orbital fat pads region (mild), [X]Buccal fat region (mild), [x]Triceps region (moderate) Muscle wasting: [x]Temples region (mild), [X]Clavicle region (moderate), [x]Shoulder region (severe), [X]Scapula region (moderate), [X]Interosseous region (severe) [X]thigh region (mild), [X]Calf region (moderate)  [X] Other: Malnutrition (chronic, moderate) related to inability to consume sufficient energy/protein in setting of altered GI function as evidenced by 16% wt loss x1 year, mild-moderate muscle & fat loss, decreased po >1 month, BMI 18.4 kg/m2, .     Nutrition Plan/Recommendations:      1) Advance diet to low salt as medically feasible. Encourage po intake at all meals.  2) Ensure Clear while on clear liquid diet. Add Ensure Enlive  BID (Chocolate flavor) once diet advanced. Supplement intake encouraged between meals.  3) Consider social work consult as pt states limited access to food due to pandemic.   4) Written/verbal nutrition education provided strategies for increasing kcal/protein intake, weight restoration, use of oral nutritional supplements, nutrition and bowel regularity and reverting to soft/bland diet when experiencing GI distress.  5) Monitor po intake, weights, BMs, skin integrity, tolerance of diet, and nutrition related labs.   6) RD to remain available prn.    PROVIDER Section:     By signing this assessment you are acknowledging and agree with the diagnosis/diagnoses assigned by the Registered Dietitian    Yessica Ramirez RD

## 2020-06-26 NOTE — DIETITIAN INITIAL EVALUATION ADULT. - OTHER INFO
80 year old male with PMH of CAD, MI (2000), RA, HTN, HLD, prostate cancer, C diff (2014), urinary retention/self caths admitted for bloody tinged stools x 6 months, weakness found to have GI bleed.    Pt visited at bedside, receiving PRBC. States fair appetite/intake PTA. Follows low fat, low salt diet at baseline due to his cardiac history. Has had limited access to quality foods over the past 4 months as wife and him are high risk thus have been ordering all groceries online and not shopping in supermarkets. Wife using farm CSA for fresh vegetables and protein intake has been mostly canned chicken/tuna, coldcuts when available. Does not each much red meat or chicken as he and his wife dislike cooking. States his UBW is 125 pounds but now fluctuates between 104-107 pounds with progressive weight loss over the past year due to GI intolerance to foods as some things just "run right through him". Was drinking Ensure supplement but stopped and is unsure as to why. Pt appears with some element of underlying IBS as he admits to intermittent diarrhea/constipation and anxiety over food selection.    Pt on clear liquid diets with fair intake/appetite. States he has been consuming around 75% of tray. Denied N/V, last BM yesterday morning.

## 2020-06-26 NOTE — DIETITIAN INITIAL EVALUATION ADULT. - ENERGY NEEDS
Wt: 104 pounds, Ht: 63 inches, BMI: 18.4 kg/m2, IBW: 115 pounds  +/-10%, %IBW: 90%  no edema or pressure injuries noted Wt: 104 pounds, Ht: 63 inches, BMI: 18.4 kg/m2, IBW: 115 pounds  +/-10%, %IBW: 90%  +1 B/L leg edema, stage 1 sacrum pressure injury

## 2020-06-26 NOTE — PROGRESS NOTE ADULT - SUBJECTIVE AND OBJECTIVE BOX
Patient is a 80y old  Male who presents with a chief complaint of  GI bleeding    HPI: Hypotonic Bladder, HTN, HLD, Prostate Cancer presents with GI bleeding and fatigue/weakness.  Patient has been having ongoing issues with GI bleeding and was scheduled to have oupt endoscopy, however was delayed due to covid 19.  He presents with Fatigue.  He chronically straight caths himself due to urinary retention for 12 years and has followed with urology.  He states he has been straight cathing with normal volume, but has been having more frequency.  He denies dysuria/SOB/CP/Dizziness/N/V.  Saw Dr. Carrasco-nephrologist sometime ago.      Patient doing better, with no new complaints    PAST MEDICAL & SURGICAL HISTORY:  Prostate CA  Hyperlipidemia  Hypertension  CAD (coronary artery disease)  Acute MI  No significant past surgical history       FAMILY HISTORY:  NC    Social History:Non smoker    MEDICATIONS  (STANDING):  pantoprazole  Injectable 40 milliGRAM(s) IV Push Once  pantoprazole  Injectable 40 milliGRAM(s) IV Push two times a day    MEDICATIONS  (PRN):   Meds reviewed    Allergies    clindamycin (Diarrhea)  penicillins (Hives)  sulfa drugs (Rash)    Intolerances         REVIEW OF SYSTEMS:    CONSTITUTIONAL:  Fatigue   EYES: No eye pain, visual disturbances, or discharge  ENMT:  No difficulty hearing, tinnitus, vertigo; No sinus or throat pain  NECK: No pain or stiffness  BREASTS: No pain, masses, or nipple discharge  RESPIRATORY: No SOB. No wheeze. No GEORGES  CARDIOVASCULAR: No chest pain, palpitations, dizziness,   GASTROINTESTINAL: No abdominal or epigastric pain. No nausea, vomiting, or hematemesis; No diarrhea or constipation. + Gi bleeding  GENITOURINARY: No dysuria, frequency, hematuria, or incontinence  NEUROLOGICAL: No headaches, memory loss, loss of strength, numbness, or tremors  SKIN: Diffuse erythema, no blisters  LYMPH NODES: No enlarged glands  ENDOCRINE: No heat or cold intolerance; No hair loss  MUSCULOSKELETAL: No joint pain or swelling   PSYCHIATRIC: No depression, anxiety, mood swings, or difficulty sleeping  HEME/LYMPH: No easy bruising, or bleeding gums  ALLERY AND IMMUNOLOGIC: No hives or eczema      ICU Vital Signs Last 24 Hrs  T(C): 36.6 (26 Jun 2020 13:30), Max: 37.8 (26 Jun 2020 02:27)  T(F): 97.9 (26 Jun 2020 13:30), Max: 100.1 (26 Jun 2020 02:27)  HR: 67 (26 Jun 2020 13:30) (61 - 82)  BP: 127/68 (26 Jun 2020 13:30) (119/66 - 151/63)  BP(mean): --  ABP: --  ABP(mean): --  RR: 17 (26 Jun 2020 13:30) (16 - 20)  SpO2: 98% (26 Jun 2020 13:30) (94% - 100%)      PHYSICAL EXAM:    GENERAL: NAD  HEAD:  Atraumatic, Normocephalic  EYES: EOMI, PERRLA, conjunctiva and sclera clear  ENMT: No tonsillar erythema, exudates, or enlargement; Moist mucous membranes, Good dentition, No lesions  NECK: Supple, neck  veins full  NERVOUS SYSTEM:  Alert & Oriented X3, Good concentration; Motor Strength wnl upper and lower extremities  CHEST/LUNG: Clear to percussion bilaterally; No rales, rhonchi, wheezing, or rubs  HEART: Regular rate and rhythm; No murmurs, rubs, or gallops  ABDOMEN: Soft, Nontender, Nondistended; Bowel sounds present,  EXTREMITIES:  +1 Edema  SKIN: No rashes or lesions, pale      LABS:             reviewed Patient is a 80y old  Male who presents with a chief complaint of  GI bleeding    HPI: Hypotonic Bladder, HTN, HLD, Prostate Cancer presents with GI bleeding and fatigue/weakness.  Patient has been having ongoing issues with GI bleeding and was scheduled to have oupt endoscopy, however was delayed due to covid 19.  He presents with Fatigue.  He chronically straight caths himself due to urinary retention for 12 years and has followed with urology.  He states he has been straight cathing with normal volume, but has been having more frequency.  He denies dysuria/SOB/CP/Dizziness/N/V.  Saw Dr. Carrasco-nephrologist sometime ago.      Had 2 episode of diarrhea.  Straight cath adequate amount.     PAST MEDICAL & SURGICAL HISTORY:  Prostate CA  Hyperlipidemia  Hypertension  CAD (coronary artery disease)  Acute MI  No significant past surgical history       FAMILY HISTORY:  NC    Social History:Non smoker    MEDICATIONS  (STANDING):  pantoprazole  Injectable 40 milliGRAM(s) IV Push Once  pantoprazole  Injectable 40 milliGRAM(s) IV Push two times a day    MEDICATIONS  (PRN):   Meds reviewed    Allergies    clindamycin (Diarrhea)  penicillins (Hives)  sulfa drugs (Rash)    Intolerances         REVIEW OF SYSTEMS:    CONSTITUTIONAL:  Fatigue   EYES: No eye pain, visual disturbances, or discharge  ENMT:  No difficulty hearing, tinnitus, vertigo; No sinus or throat pain  NECK: No pain or stiffness  BREASTS: No pain, masses, or nipple discharge  RESPIRATORY: No SOB. No wheeze. No GEORGES  CARDIOVASCULAR: No chest pain, palpitations, dizziness,   GASTROINTESTINAL: No abdominal or epigastric pain. No nausea, vomiting, or hematemesis; No diarrhea or constipation. + Gi bleeding  GENITOURINARY: No dysuria, frequency, hematuria, or incontinence  NEUROLOGICAL: No headaches, memory loss, loss of strength, numbness, or tremors  SKIN: Diffuse erythema, no blisters  LYMPH NODES: No enlarged glands  ENDOCRINE: No heat or cold intolerance; No hair loss  MUSCULOSKELETAL: No joint pain or swelling   PSYCHIATRIC: No depression, anxiety, mood swings, or difficulty sleeping  HEME/LYMPH: No easy bruising, or bleeding gums  ALLERY AND IMMUNOLOGIC: No hives or eczema      ICU Vital Signs Last 24 Hrs  T(C): 36.6 (26 Jun 2020 13:30), Max: 37.8 (26 Jun 2020 02:27)  T(F): 97.9 (26 Jun 2020 13:30), Max: 100.1 (26 Jun 2020 02:27)  HR: 67 (26 Jun 2020 13:30) (61 - 82)  BP: 127/68 (26 Jun 2020 13:30) (119/66 - 151/63)  BP(mean): --  ABP: --  ABP(mean): --  RR: 17 (26 Jun 2020 13:30) (16 - 20)  SpO2: 98% (26 Jun 2020 13:30) (94% - 100%)      PHYSICAL EXAM:    GENERAL: NAD  HEAD:  Atraumatic, Normocephalic  EYES: EOMI, PERRLA, conjunctiva and sclera clear  ENMT: No tonsillar erythema, exudates, or enlargement; Moist mucous membranes, Good dentition, No lesions  NECK: Supple, neck  veins full  NERVOUS SYSTEM:  Alert & Oriented X3, Good concentration; Motor Strength wnl upper and lower extremities  CHEST/LUNG: Clear to percussion bilaterally; No rales, rhonchi, wheezing, or rubs  HEART: Regular rate and rhythm; No murmurs, rubs, or gallops  ABDOMEN: Soft, Nontender, Nondistended; Bowel sounds present,  EXTREMITIES:  +1 Edema  SKIN: No rashes or lesions, pale      LABS:             reviewed

## 2020-06-26 NOTE — PROGRESS NOTE ADULT - PROBLEM SELECTOR PLAN 1
Acute on chronic GI bleed, pt has history of bright red blood per rectum since may 2019  -continue IV protonix BID   +FOBT  -  no bleeding overnight, s/p 1unit PRBC  - Hb 7.4 today, will give additional unit now  - f/u H&H post tranfusion   -consulted GI Dr Romero ongoing issue, to f/u with GI Dr. Rajesh Carballo: (pt's outpatient GI doctor) once Hb and bleeding stable Acute on chronic GI bleed, pt has history of bright red blood per rectum since may 2019  -continue IV protonix BID   +FOBT  -  no bleeding overnight, s/p 1unit PRBC  - Hb 7.4 today, will give additional unit now  - f/u H&H post tranfusion   -apprec GI recs Dr Jordan as per patient preference, no longer wishes to see Dr Carballo

## 2020-06-26 NOTE — PROGRESS NOTE ADULT - PROBLEM SELECTOR PLAN 2
acute blood loss anemia in setting of GIB  s/p 1 unit prbc, will give additional unit now  recheck H&H after transfusion  continue iron supp acute blood loss anemia in setting of GIB  s/p 1 unit prbc, will give additional unit now  recheck H&H after transfusion  apprec hemat recs  continue iron supp

## 2020-06-26 NOTE — PROGRESS NOTE ADULT - ATTENDING COMMENTS
81 YO M with pmhx of CAD, HTN, HLD, prostate cancer (seed radiation 2009), MI (nov 2000, angioplasty w 3 stents to the right coronary artery), rheumatoid and psoriatic arthritis, plaque psoriasis, b/l hip replacements (left 1998, right 2002), C. diff in 2014, urinary retention with self catheretization hypothyroidism, mild asthma, melanoma in situ, admitted for GIB Plan: monitor h/h, monitor i/os, apprec GI recs, monitor clinical course, apprec hemat recs, apprec cardio optimization if needed for colonoscopy

## 2020-06-26 NOTE — PROGRESS NOTE ADULT - ASSESSMENT
MARY on CKD 2  GI bleeding  HTN  Anemia    -BLCr 0.9  -MARY likely 2/2 hypoxic/prerenal injury driven by GI bleeding, creatinine improved  -UA 30 protein  -Ur lytes reviewed   -Can D/C IVF  -PRCBs per primary  -BP well controlled at present, hold HCTZ and ACEI  -Renal US if creatinine worsening  -Replete K phos MARY on CKD 2  GI bleeding  HTN  Anemia    -BLCr 0.9  -MARY likely 2/2 hypoxic/prerenal injury driven by GI bleeding, creatinine improved  -UA 30 protein  -Ur lytes reviewed   -Can D/C IVF  -PRCBs per primary  -BP well controlled at present, hold HCTZ and ACEI  -Replete K phos

## 2020-06-27 NOTE — PROGRESS NOTE ADULT - ASSESSMENT
81y/o M PMH of CAD, HTN, HLD, prostate cancer (seed radiation 2009), MI (nov 2000, angioplasty w 3 stents to the right coronary artery), rheumatoid and psoriatic arthritis, plaque psoriasis, b/l hip replacements (left 1998, right 2002), C diff in 2014, urinary retention with self catheretization hypothyroidism, mild asthma, melanoma in situ presents for GIB. Pt states he noticed right red blood in his stools for 6 months. Pt has been feeling weakness, weight loss of 19 pounds since June 2019. Pt sent in by outpatient hematologist Dr. Bergeron given low Hgb outpatient. (6.8 on 6/24) Pt denies SOB or GEORGES, however feels weak when pushing heavy objects. Pt was scheduled with his GI doctor Dr. Mullen for outpatient colonoscopy in March 2020. However, colonoscopy canceled due to COVID19. Pt self catheterizes 5-6 times a day.    CARDIAC OPTIMIZATION/CLEARANCE  - There is no evidence of acute ischemia.  - EKG is unchanged when compared to prior  - Pt w/o anginal complaints  - There is no evidence of significant arrhythmia.  - There is no evidence for meaningful  volume overload.  - TTE 12/1/18 mild AS, LVD,45 %, no need to repeat  - Patient is optimized for possible colonoscopy procedure    CAD S/P stent  - Stable  - Continue Toprol  mg PO daily  - Continue Statin     HTN  - BP: 133/68 (06-27-20 @ 05:14) (119/66 - 157/75)  - Continue BB at home dose  - Norvasc can be added if tighter BP control needed.  - BUN and creatinine are normalized   - Hold HCTZ in setting of GI Bleed, resume prior to discharge  - Monitor routine hemodynamics.     HLD  - Continue statin    Anemia  - Patient remains anemic  - Hemoglobin: 9.1 (06-27-20 @ 07:29) Hematocrit: 28.5 (06-27-20 @ 07:29)  - S/P PRBCs  - Trend CBC  - Would transfuse to keep Hb>8 and watch volume status     MARY  - Improved. Serum: 0.74 mg/dL (06-27-20 @ 07:29)  - Holding HCTZ  for now, resume prior to discharge    - Monitor and replete lytes, keep K>4, Mg>2.  - All other medical needs as per primary team.  - Other cardiovascular workup will depend on clinical course.  - Will continue to follow.    Margarita Grullon, MS FNP, [facility]  Nurse Practitioner- Cardiology   Spectra #[phone]

## 2020-06-27 NOTE — PROGRESS NOTE ADULT - ASSESSMENT
MARY on CKD 2  GI bleeding  HTN  Anemia    -BLCr 0.9  -MARY likely 2/2 hypoxic/prerenal injury driven by GI bleeding  -UA 30 protein  -Ur lytes reviewed   -Renal indices are stable at baseline range  -Off IVF  -PRBCs per primary  -BP rising again; okay to restart ACEi  -GI eval noted; planning colonoscopy    Thank you

## 2020-06-27 NOTE — CONSULT NOTE ADULT - SUBJECTIVE AND OBJECTIVE BOX
79 y/o man with hx of CAD, MI, Cardiac arrest remotely, with hx of anemia def anemia requiring iron infusions,       H/H 6.8/23.0 on 6/24/20        All other ROS negative        MEDICATIONS  (STANDING):  ferrous    sulfate 325 milliGRAM(s) Oral daily  finasteride 5 milliGRAM(s) Oral daily  fluticasone propionate 50 MICROgram(s)/spray Nasal Spray 2 Spray(s) Both Nostrils <User Schedule>  levothyroxine 25 MICROGram(s) Oral daily  loratadine 10 milliGRAM(s) Oral daily  metoprolol succinate  milliGRAM(s) Oral daily  montelukast 10 milliGRAM(s) Oral at bedtime  nitrofurantoin macrocrystals (MACRODANTIN) 50 milliGRAM(s) Oral <User Schedule>  pantoprazole  Injectable 40 milliGRAM(s) IV Push two times a day  potassium phosphate / sodium phosphate Tablet (K-PHOS No. 2) 1 Tablet(s) Oral four times a day with meals  predniSONE   Tablet 1 milliGRAM(s) Oral four times a day  simvastatin 40 milliGRAM(s) Oral at bedtime  tamsulosin 0.4 milliGRAM(s) Oral at bedtime    MEDICATIONS  (PRN):        Allergies    cephalexin (Urticaria)  clindamycin (Diarrhea)  erythromycin (Other)  Methotrexate Sodium (Unknown)  penicillins (Hives)  sporalin (oxypsoralin/PUVA) (Other)  sulfa drugs (Rash)    Intolerances        PAST MEDICAL & SURGICAL HISTORY:  Asthma  Melanoma in situ  Hypothyroid  H/O Clostridium difficile infection  Urinary retention  Plaque psoriasis  History of urinary self-catheterization  Arthritis  Prostate CA: seed therapy aug 2009  Hyperlipidemia  Hypertension  CAD (coronary artery disease)  Acute MI  S/P tonsillectomy  H/O hernia repair  S/P cardiac cath        FAMILY HISTORY:  FH: asthma  FH: Alzheimers disease          ICU Vital Signs Last 24 Hrs  T(C): 36.5 (27 Jun 2020 05:14), Max: 37.3 (26 Jun 2020 21:32)  T(F): 97.7 (27 Jun 2020 05:14), Max: 99.2 (26 Jun 2020 21:32)  HR: 72 (27 Jun 2020 05:14) (61 - 82)  BP: 133/68 (27 Jun 2020 05:14) (119/66 - 157/75)  BP(mean): --  ABP: --  ABP(mean): --  RR: 18 (27 Jun 2020 05:14) (17 - 20)  SpO2: 93% (27 Jun 2020 05:14) (93% - 100%)      PHYSICAL EXAM:    Constitutional:  Comfortable appearing  HEENT: NCAT, anicteric sclera, moist mucous membranes  Respiratory:  CTA b/l, no w/r/r  Cardiovascular:  nl S1, S2, no m/r/g  Gastrointestinal:  Soft, +BS, NT, ND, no hepatosplenomegally  Extremities:  no E/C/C  Neurological:  Alert and orriented x 3.  Skin:  no Jaundice  Lymph Nodes:  no lymphadenopathy in neck, no supraclavicular adenopathy  Musculoskeletal:  normal gait  Psychiatric:  Normal Mood and affect                              9.0    x     )-----------( x        ( 26 Jun 2020 16:54 )             27.9       06-26    141  |  106  |  20  ----------------------------<  118<H>  3.1<L>   |  25  |  1.00    Ca    8.4<L>      26 Jun 2020 07:17  Phos  1.8     06-26  Mg     1.6     06-26    TPro  6.5  /  Alb  3.1<L>  /  TBili  0.3  /  DBili  x   /  AST  95<H>  /  ALT  51  /  AlkPhos  107  06-25 81 y/o man with hx of CAD, MI, Cardiac arrest remotely, with hx of anemia def anemia requiring iron infusions, prostate cancer s/p seeds, remote C-diff who is admitted for Anemia and GI bleeding.     Patient reports since the past 6 months he has had intermittent red blood in stool - each time "just a little bit" in stool.  He has had irregular stools.  Since yesterday afternoon he has had brown watery diarrhea, to the point he had an fecal accident.  Reports loosing weight since the past couple of months.   He has a hx of heartburn.  Denies having nausea, vomiting, dysphagia, odynophagia, fevers, chills, abdominal pain.  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  He was supposed to have an upper endoscopy and colonoscopy by his outside Gastroenterologist Dr. Carballo but procedures were delayed because of COVID pandemic.    He says years ago he had an upper endoscopy and colonoscopy - recalls having diverticulosis.      Recent labs were H/H 6.8/23.0 on 6/24/20.  S/p 2 units of PRBC while in hospital and H/H now more appropriate.          Reports feeling weak.  All other ROS negative        MEDICATIONS  (STANDING):  ferrous    sulfate 325 milliGRAM(s) Oral daily  finasteride 5 milliGRAM(s) Oral daily  fluticasone propionate 50 MICROgram(s)/spray Nasal Spray 2 Spray(s) Both Nostrils <User Schedule>  levothyroxine 25 MICROGram(s) Oral daily  loratadine 10 milliGRAM(s) Oral daily  metoprolol succinate  milliGRAM(s) Oral daily  montelukast 10 milliGRAM(s) Oral at bedtime  nitrofurantoin macrocrystals (MACRODANTIN) 50 milliGRAM(s) Oral <User Schedule>  pantoprazole  Injectable 40 milliGRAM(s) IV Push two times a day  potassium phosphate / sodium phosphate Tablet (K-PHOS No. 2) 1 Tablet(s) Oral four times a day with meals  predniSONE   Tablet 1 milliGRAM(s) Oral four times a day  simvastatin 40 milliGRAM(s) Oral at bedtime  tamsulosin 0.4 milliGRAM(s) Oral at bedtime    MEDICATIONS  (PRN):        Allergies    cephalexin (Urticaria)  clindamycin (Diarrhea)  erythromycin (Other)  Methotrexate Sodium (Unknown)  penicillins (Hives)  sporalin (oxypsoralin/PUVA) (Other)  sulfa drugs (Rash)    Intolerances        PAST MEDICAL & SURGICAL HISTORY:  Asthma  Melanoma in situ  Hypothyroid  H/O Clostridium difficile infection  Urinary retention  Plaque psoriasis  History of urinary self-catheterization  Arthritis  Prostate CA: seed therapy aug 2009  Hyperlipidemia  Hypertension  CAD (coronary artery disease)  Acute MI  S/P tonsillectomy  H/O hernia repair  S/P cardiac cath        FAMILY HISTORY:  FH: asthma  FH: Alzheimers disease  Maternal aunt had colon cancer.         ICU Vital Signs Last 24 Hrs  T(C): 36.5 (27 Jun 2020 05:14), Max: 37.3 (26 Jun 2020 21:32)  T(F): 97.7 (27 Jun 2020 05:14), Max: 99.2 (26 Jun 2020 21:32)  HR: 72 (27 Jun 2020 05:14) (61 - 82)  BP: 133/68 (27 Jun 2020 05:14) (119/66 - 157/75)  BP(mean): --  ABP: --  ABP(mean): --  RR: 18 (27 Jun 2020 05:14) (17 - 20)  SpO2: 93% (27 Jun 2020 05:14) (93% - 100%)      PHYSICAL EXAM:    Constitutional:  Comfortable appearing  HEENT: NCAT, anicteric sclera, moist mucous membranes  Respiratory:  CTA b/l, no w/r/r  Cardiovascular:  nl S1, S2  Gastrointestinal:  Soft, +BS, NT, ND  Extremities:  no E/C/C  Neurological:  A&O x 3.  Skin:  no Jaundice  Psychiatric:  Normal Mood and affect                              9.0    x     )-----------( x        ( 26 Jun 2020 16:54 )             27.9       06-26    141  |  106  |  20  ----------------------------<  118<H>  3.1<L>   |  25  |  1.00    Ca    8.4<L>      26 Jun 2020 07:17  Phos  1.8     06-26  Mg     1.6     06-26    TPro  6.5  /  Alb  3.1<L>  /  TBili  0.3  /  DBili  x   /  AST  95<H>  /  ALT  51  /  AlkPhos  107  06-25 79 y/o man with hx of CAD, MI, Cardiac arrest remotely, with hx of anemia def anemia requiring iron infusions, prostate cancer s/p seeds, remote C-diff who is admitted for Anemia and GI bleeding.     Patient reports since the past 6 months he has had intermittent red blood in stool - each time "just a little bit" in stool.  He has had irregular stools.  Since yesterday afternoon he has had brown watery diarrhea, to the point he had an fecal accident.  Reports loosing weight since the past couple of months.   He has a hx of heartburn.  Denies having nausea, vomiting, dysphagia, odynophagia, fevers, chills, abdominal pain.  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  He was supposed to have an upper endoscopy and colonoscopy by his outside Gastroenterologist Dr. Carballo but procedures were delayed because of COVID pandemic.    He says years ago he had an upper endoscopy and colonoscopy - recalls having diverticulosis.      Recent labs were H/H 6.8/23.0 on 6/24/20.  S/p 2 units of PRBC while in hospital and H/H now more appropriate.          Reports feeling weak.  All other ROS negative        MEDICATIONS  (STANDING):  ferrous    sulfate 325 milliGRAM(s) Oral daily  finasteride 5 milliGRAM(s) Oral daily  fluticasone propionate 50 MICROgram(s)/spray Nasal Spray 2 Spray(s) Both Nostrils <User Schedule>  levothyroxine 25 MICROGram(s) Oral daily  loratadine 10 milliGRAM(s) Oral daily  metoprolol succinate  milliGRAM(s) Oral daily  montelukast 10 milliGRAM(s) Oral at bedtime  nitrofurantoin macrocrystals (MACRODANTIN) 50 milliGRAM(s) Oral <User Schedule>  pantoprazole  Injectable 40 milliGRAM(s) IV Push two times a day  potassium phosphate / sodium phosphate Tablet (K-PHOS No. 2) 1 Tablet(s) Oral four times a day with meals  predniSONE   Tablet 1 milliGRAM(s) Oral four times a day  simvastatin 40 milliGRAM(s) Oral at bedtime  tamsulosin 0.4 milliGRAM(s) Oral at bedtime    MEDICATIONS  (PRN):        Allergies    cephalexin (Urticaria)  clindamycin (Diarrhea)  erythromycin (Other)  Methotrexate Sodium (Unknown)  penicillins (Hives)  sporalin (oxypsoralin/PUVA) (Other)  sulfa drugs (Rash)    Intolerances        PAST MEDICAL & SURGICAL HISTORY:  Asthma  Melanoma in situ  Hypothyroid  H/O Clostridium difficile infection  Urinary retention  Plaque psoriasis  History of urinary self-catheterization  Arthritis  Prostate CA: seed therapy aug 2009  Hyperlipidemia  Hypertension  CAD (coronary artery disease)  Acute MI  S/P tonsillectomy  H/O hernia repair  S/P cardiac cath        FAMILY HISTORY:  FH: asthma  FH: Alzheimers disease  Maternal aunt had colon cancer.         ICU Vital Signs Last 24 Hrs  T(C): 36.5 (27 Jun 2020 05:14), Max: 37.3 (26 Jun 2020 21:32)  T(F): 97.7 (27 Jun 2020 05:14), Max: 99.2 (26 Jun 2020 21:32)  HR: 72 (27 Jun 2020 05:14) (61 - 82)  BP: 133/68 (27 Jun 2020 05:14) (119/66 - 157/75)  BP(mean): --  ABP: --  ABP(mean): --  RR: 18 (27 Jun 2020 05:14) (17 - 20)  SpO2: 93% (27 Jun 2020 05:14) (93% - 100%)      PHYSICAL EXAM:    Constitutional:  Comfortable appearing  HEENT: NCAT, anicteric sclera, moist mucous membranes  Respiratory:  CTA b/l, no w/r/r  Cardiovascular:  nl S1, S2  Gastrointestinal:  Soft, +BS, NT, ND  Extremities:  no E/C/C  Neurological:  A&O x 3.  Skin:  no Jaundice  Psychiatric:  Normal Mood and affect                                         9.1    11.80 )-----------( 280      ( 27 Jun 2020 07:29 )             28.5       06-26    141  |  106  |  20  ----------------------------<  118<H>  3.1<L>   |  25  |  1.00    Ca    8.4<L>      26 Jun 2020 07:17  Phos  1.8     06-26  Mg     1.6     06-26    TPro  6.5  /  Alb  3.1<L>  /  TBili  0.3  /  DBili  x   /  AST  95<H>  /  ALT  51  /  AlkPhos  107  06-25

## 2020-06-27 NOTE — PROGRESS NOTE ADULT - SUBJECTIVE AND OBJECTIVE BOX
[facility] -- Noe Land Grossman, [place], Yuri Ruvalcaba Savella, Goodger: Office # [phone]    Follow Up:  GI Bleed     Subjective/Observations: Patient seen and examined. Patient awake and alert, resting comfortably in bed. No complaints of chest pain, SOB, LE edema, cough. No signs of orthopnea or PND.    REVIEW OF SYSTEMS: All review of systems is negative for eye, ENT, GI, , allergic, dermatologic, musculoskeletal and neurologic except as described above    PAST MEDICAL & SURGICAL HISTORY:  Asthma  Melanoma in situ  Hypothyroid  H/O Clostridium difficile infection  Urinary retention  Plaque psoriasis  History of urinary self-catheterization  Arthritis  Prostate CA: seed therapy aug 2009  Hyperlipidemia  Hypertension  Hypertension  CAD (coronary artery disease)  Acute MI  S/P tonsillectomy  H/O hernia repair  S/P cardiac cath    MEDICATIONS  (STANDING):  ferrous    sulfate 325 milliGRAM(s) Oral daily  finasteride 5 milliGRAM(s) Oral daily  fluticasone propionate 50 MICROgram(s)/spray Nasal Spray 2 Spray(s) Both Nostrils <User Schedule>  levothyroxine 25 MICROGram(s) Oral daily  loratadine 10 milliGRAM(s) Oral daily  metoprolol succinate  milliGRAM(s) Oral daily  montelukast 10 milliGRAM(s) Oral at bedtime  nitrofurantoin macrocrystals (MACRODANTIN) 50 milliGRAM(s) Oral <User Schedule>  pantoprazole  Injectable 40 milliGRAM(s) IV Push two times a day  predniSONE   Tablet 1 milliGRAM(s) Oral four times a day  simvastatin 40 milliGRAM(s) Oral at bedtime  tamsulosin 0.4 milliGRAM(s) Oral at bedtime    MEDICATIONS  (PRN):    Allergies  cephalexin (Urticaria)  clindamycin (Diarrhea)  erythromycin (Other)  Methotrexate Sodium (Unknown)  penicillins (Hives)  sporalin (oxypsoralin/PUVA) (Other)  sulfa drugs (Rash)    Vital Signs Last 24 Hrs  T(C): 36.5 (27 Jun 2020 05:14), Max: 37.3 (26 Jun 2020 21:32)  T(F): 97.7 (27 Jun 2020 05:14), Max: 99.2 (26 Jun 2020 21:32)  HR: 72 (27 Jun 2020 05:14) (67 - 82)  BP: 133/68 (27 Jun 2020 05:14) (119/66 - 157/75)  BP(mean): --  RR: 18 (27 Jun 2020 05:14) (17 - 20)  SpO2: 93% (27 Jun 2020 05:14) (93% - 100%)    I&O's Summary    26 Jun 2020 07:01  -  27 Jun 2020 07:00  --------------------------------------------------------  IN: 284 mL / OUT: 2130 mL / NET: -1846 mL    27 Jun 2020 07:01  -  27 Jun 2020 11:10  --------------------------------------------------------  IN: 500 mL / OUT: 430 mL / NET: 70 mL    TELE:  60-90s PVC  PHYSICAL EXAM:  Appearance: NAD, no distress, alert, Frail   HEENT: Moist Mucous Membranes, Anicteric  Cardiovascular: Regular rate and rhythm, Normal S1 S2, No JVD, No murmurs, No rubs, gallops or clicks  Respiratory: Non-labored, Clear to auscultation, No rales, No rhonchi, No wheezing.   Gastrointestinal:  Soft, Non-tender, + BS  Neurologic: Non-focal  Skin: Warm and dry, No visible rashes or ulcers, No ecchymosis, No cyanosis  Musculoskeletal: No clubbing, No cyanosis, No joint swelling/tenderness  Psychiatry: Mood & affect appropriate  Lymph: No peripheral edema.     LABS: All Labs Reviewed:                        9.1    11.80 )-----------( 280      ( 27 Jun 2020 07:29 )             28.5                         9.0    x     )-----------( x        ( 26 Jun 2020 16:54 )             27.9                         7.4    11.96 )-----------( 263      ( 26 Jun 2020 07:17 )             23.3     27 Jun 2020 07:29    142    |  110    |  13     ----------------------------<  83     3.6     |  26     |  0.74   26 Jun 2020 07:17    141    |  106    |  20     ----------------------------<  118    3.1     |  25     |  1.00   25 Jun 2020 11:48    141    |  105    |  30     ----------------------------<  88     3.9     |  28     |  1.50     Ca    8.4        27 Jun 2020 07:29  Ca    8.4        26 Jun 2020 07:17  Ca    9.2        25 Jun 2020 11:48  Phos  2.0       27 Jun 2020 07:29  Phos  1.8       26 Jun 2020 07:17  Mg     1.6       27 Jun 2020 07:29  Mg     1.6       26 Jun 2020 07:17    TPro  5.0    /  Alb  2.3    /  TBili  0.5    /  DBili  x      /  AST  58     /  ALT  41     /  AlkPhos  96     27 Jun 2020 07:29  TPro  6.5    /  Alb  3.1    /  TBili  0.3    /  DBili  x      /  AST  95     /  ALT  51     /  AlkPhos  107    25 Jun 2020 11:48  PT/INR - ( 25 Jun 2020 11:48 )   PT: 12.7 sec;   INR: 1.13 ratio    PTT - ( 25 Jun 2020 11:48 )  PTT:31.7 sec    12 Lead ECG:   Ventricular Rate 69 BPM  Atrial Rate 69 BPM  P-R Interval 134 ms  QRS Duration 90 ms  Q-T Interval 440 ms  QTC Calculation(Bezet) 471 ms  P Axis 61 degrees  R Axis -31 degrees  T Axis 133 degrees  Diagnosis Line Normal sinus rhythm  Left axis deviation  Left ventricular hypertrophy with repolarization abnormality  Inferior infarct (cited on or before 29-MAR-2010)  Nonspecific ST abnormality  Abnormal ECG  Confirmed by francois Kaplan ([id number]) on 6/25/2020 2:14:26 PM (06-25-20 @ 12:09)    < from: Xray Chest 1 View- PORTABLE-Routine (06.25.20 @ 12:15) >  Heart magnified by projection. Atherosclerotic aorta. Occasional bilateral calcified granulomas. No acute infiltrate or pleural effusion.    Impression: No active disease  < end of copied text >

## 2020-06-27 NOTE — PROGRESS NOTE ADULT - PROBLEM SELECTOR PLAN 2
acute blood loss anemia in setting of GIB, stable  s/p 1 unit prbc, will give additional unit now  recheck H&H after transfusion, transfuse for hgb<8  apprec hemat recs  continue iron supp

## 2020-06-27 NOTE — CONSULT NOTE ADULT - ASSESSMENT
IMPRESSION:   1.  Hx of intermittent rectal bleeding   2.  Brown watery diarrhea since yesterday   3.  Anemia requiring 2 units of PRBC - responded   4.  Hx of diverticulosis  5.  Family hx of colon cancer   6.  Personal hx of prostate cancer s/p seed radiation   7.  Hx of c-diff  8.  Hx of GERD  9.  CAD, MI, s/p stent      PLAN:   1.  We will plan for an upper endoscopy and colonoscopy to r/o ulcers, mucosal lesions, AVMs etc. under monitored anesthesia care.  Risks of the procedures, anesthesia and bowel prep have been extensively reviewed including bowel perforation requiring surgery, bleeding, infection, cardiopulmonary compromise, and adverse reaction to bowel prep etc were discussed with the patient who would like to proceed.   Tentative date for procedure this Monday June 29, 2020.   2.  Continue clear liquid diet today and tomorrow.  3.  Start Golytely prep at 4 pm tomorrow.   4.  He will need cardiology clearance.   5.  Continue PPI IV BID    6.  Send stool for c-diff. IMPRESSION:   1.  Hx of intermittent rectal bleeding   2.  Brown watery diarrhea since yesterday   3.  Anemia requiring 2 units of PRBC - responded   4.  Hx of diverticulosis  5.  Family hx of colon cancer   6.  Personal hx of prostate cancer s/p seed radiation   7.  Hx of c-diff  8.  Hx of GERD  9.  CAD, MI, s/p stent      PLAN:   1.  We will plan for an upper endoscopy and colonoscopy to r/o ulcers, mucosal lesions, AVMs etc. under monitored anesthesia care.  Risks of the procedures, anesthesia and bowel prep have been extensively reviewed including bowel perforation requiring surgery, bleeding, infection, cardiopulmonary compromise, and adverse reaction to bowel prep etc were discussed with the patient who would like to proceed.   Tentative date for procedure this Monday June 29, 2020.   2.  Continue clear liquid diet today and tomorrow.  3.  Start Golytely prep at 4 pm tomorrow.   4.  He will need cardiology clearance.   5.  Continue PPI IV BID    6.  Send stool for c-diff.     I called wife Blanca, and above discussed with her as well.

## 2020-06-27 NOTE — PROGRESS NOTE ADULT - SUBJECTIVE AND OBJECTIVE BOX
All interim records and events noted.    feeling improved  tolerated PRBC tx well yesterday      MEDICATIONS  (STANDING):  ferrous    sulfate 325 milliGRAM(s) Oral daily  finasteride 5 milliGRAM(s) Oral daily  fluticasone propionate 50 MICROgram(s)/spray Nasal Spray 2 Spray(s) Both Nostrils <User Schedule>  levothyroxine 25 MICROGram(s) Oral daily  loratadine 10 milliGRAM(s) Oral daily  metoprolol succinate  milliGRAM(s) Oral daily  montelukast 10 milliGRAM(s) Oral at bedtime  nitrofurantoin macrocrystals (MACRODANTIN) 50 milliGRAM(s) Oral <User Schedule>  pantoprazole  Injectable 40 milliGRAM(s) IV Push two times a day  predniSONE   Tablet 1 milliGRAM(s) Oral four times a day  simvastatin 40 milliGRAM(s) Oral at bedtime  tamsulosin 0.4 milliGRAM(s) Oral at bedtime    MEDICATIONS  (PRN):      Vital Signs Last 24 Hrs  T(C): 36.5 (27 Jun 2020 05:14), Max: 37.3 (26 Jun 2020 21:32)  T(F): 97.7 (27 Jun 2020 05:14), Max: 99.2 (26 Jun 2020 21:32)  HR: 72 (27 Jun 2020 05:14) (61 - 82)  BP: 133/68 (27 Jun 2020 05:14) (119/66 - 157/75)  BP(mean): --  RR: 18 (27 Jun 2020 05:14) (17 - 20)  SpO2: 93% (27 Jun 2020 05:14) (93% - 100%)    PHYSICAL EXAM  General: well developed  well nourished, thin elderly man, in no acute distress  Head: atraumatic, normocephalic  ENT: sclera anicteric, buccal mucosa moist  Neck: supple, trachea midline  CV: S1 S2, regular rate and rhythm  Lungs: clear to auscultation, no wheezes/rhonchi  Abdomen: soft, nontender, bowel sounds present, no palpable masses  Extrem: no clubbing/cyanosis/edema  Skin: no significant increased ecchymosis/petechiae  Neuro: alert and oriented X3,  no focal deficits      LABS:             9.1    11.80 )-----------( 280      ( 06-27 @ 07:29 )             28.5                9.0    x     )-----------( x        ( 06-26 @ 16:54 )             27.9                7.4    11.96 )-----------( 263      ( 06-26 @ 07:17 )             23.3                7.7    x     )-----------( x        ( 06-25 @ 18:21 )             24.6                7.5    13.44 )-----------( 334      ( 06-25 @ 11:48 )             25.2       06-27    142  |  110<H>  |  13  ----------------------------<  83  3.6   |  26  |  0.74    Ca    8.4<L>      27 Jun 2020 07:29  Phos  2.0     06-27  Mg     1.6     06-27    TPro  5.0<L>  /  Alb  2.3<L>  /  TBili  0.5  /  DBili  x   /  AST  58<H>  /  ALT  41  /  AlkPhos  96  06-27 06-25 @ 11:48  PT12.7 INR1.13  PTT31.7      RADIOLOGY & ADDITIONAL STUDIES:    IMPRESSION/RECOMMENDATIONS:

## 2020-06-27 NOTE — PROGRESS NOTE ADULT - ASSESSMENT
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

## 2020-06-27 NOTE — PROGRESS NOTE ADULT - SUBJECTIVE AND OBJECTIVE BOX
Patient is a 80y old  Male who presents with a chief complaint of  GI bleeding    HPI: Hypotonic Bladder, HTN, HLD, Prostate Cancer presents with GI bleeding and fatigue/weakness.  Patient has been having ongoing issues with GI bleeding and was scheduled to have oupt endoscopy, however was delayed due to covid 19.  He presents with Fatigue.  He chronically straight caths himself due to urinary retention for 12 years and has followed with urology.  He states he has been straight cathing with normal volume, but has been having more frequency.  He denies dysuria/SOB/CP/Dizziness/N/V.  Saw Dr. Carrasco-nephrologist sometime ago.      Had 2 episode of diarrhea.  Straight cath adequate amount.     Follow up Acute on CKD Stage 2  No new complaints    PAST MEDICAL & SURGICAL HISTORY:  Prostate CA  Hyperlipidemia  Hypertension  CAD (coronary artery disease)  Acute MI  No significant past surgical history       FAMILY HISTORY:  NC    Social History:Non smoker    MEDICATIONS  (STANDING):  pantoprazole  Injectable 40 milliGRAM(s) IV Push Once  pantoprazole  Injectable 40 milliGRAM(s) IV Push two times a day    MEDICATIONS  (PRN):   Meds reviewed    Allergies    clindamycin (Diarrhea)  penicillins (Hives)  sulfa drugs (Rash)    Intolerances         REVIEW OF SYSTEMS:    CONSTITUTIONAL:  Fatigue   EYES: No eye pain, visual disturbances, or discharge  ENMT:  No difficulty hearing, tinnitus, vertigo; No sinus or throat pain  NECK: No pain or stiffness  BREASTS: No pain, masses, or nipple discharge  RESPIRATORY: No SOB. No wheeze. No GEORGES  CARDIOVASCULAR: No chest pain, palpitations, dizziness,   GASTROINTESTINAL: No abdominal or epigastric pain. No nausea, vomiting, or hematemesis; No diarrhea or constipation. + GI bleeding  GENITOURINARY: No dysuria, frequency, hematuria, or incontinence  NEUROLOGICAL: No headaches, memory loss, loss of strength, numbness, or tremors  SKIN: no Diffuse erythema, no blisters  LYMPH NODES: No enlarged glands  ENDOCRINE: No heat or cold intolerance; No hair loss  MUSCULOSKELETAL: No joint pain or swelling   PSYCHIATRIC: No depression, anxiety, mood swings, or difficulty sleeping  HEME/LYMPH: No easy bruising, or bleeding gums  ALLERGY AND IMMUNOLOGIC: No hives or eczema      ICU Vital Signs Last 24 Hrs  T(C): 36.6 (2020 13:30), Max: 37.8 (2020 02:27)  T(F): 97.9 (2020 13:30), Max: 100.1 (2020 02:27)  HR: 67 (2020 13:30) (61 - 82)  BP: 127/68 (2020 13:30) (119/66 - 151/63)  BP(mean): --  ABP: --  ABP(mean): --  RR: 17 (2020 13:30) (16 - 20)  SpO2: 98% (2020 13:30) (94% - 100%)      PHYSICAL EXAM:    GENERAL: No acute distress  HEAD:  Atraumatic, Normocephalic  EYES: Conjunctiva and sclera clear  ENMT: No tonsillar erythema, exudates, or enlargement; Moist mucous membranes, Good dentition, No lesions  NECK: Supple, neck  veins full  NERVOUS SYSTEM:  Alert & Oriented X3, Good concentration; Motor Strength wnl upper and lower extremities  CHEST/LUNG: Clear to percussion bilaterally; No rales, rhonchi, wheezing, or rubs  HEART: Regular rate and rhythm; No murmurs, rubs, or gallops  ABDOMEN: Soft, Nontender, Nondistended; Bowel sounds present,  EXTREMITIES:  +1 Edema  SKIN: No rashes or lesions, pale      LABS:                             9.1    11.80 )-----------( 280      ( 2020 07:29 )             28.5     06-    142  |  110<H>  |  13  ----------------------------<  83  3.6   |  26  |  0.74    Ca    8.4<L>      2020 07:29  Phos  2.0       Mg     1.6     27    TPro  5.0<L>  /  Alb  2.3<L>  /  TBili  0.5  /  DBili  x   /  AST  58<H>  /  ALT  41  /  AlkPhos  96  27    PT/INR - ( 2020 11:48 )   PT: 12.7 sec;   INR: 1.13 ratio         PTT - ( 2020 11:48 )  PTT:31.7 sec  Urinalysis Basic - ( 2020 16:55 )    Color: Yellow / Appearance: Clear / S.005 / pH: x  Gluc: x / Ketone: Negative  / Bili: Negative / Urobili: Negative   Blood: x / Protein: 30 mg/dL / Nitrite: Negative   Leuk Esterase: Negative / RBC: x / WBC x   Sq Epi: x / Non Sq Epi: x / Bacteria: x      Magnesium, Serum: 1.6 mg/dL ( @ 07:29)  Phosphorus Level, Serum: 2.0 mg/dL ( @ 07:29)

## 2020-06-27 NOTE — PROGRESS NOTE ADULT - PROBLEM SELECTOR PLAN 1
Acute on chronic GI bleed, pt has history of bright red blood per rectum since may 2019  -continue IV protonix BID   +FOBT  - apprec GI recs: colonoscopy planned for monday, golytely prep tomorrow at 4pm,   -apprec cardio optimization, medical optimization to follow tomorrow

## 2020-06-27 NOTE — PROGRESS NOTE ADULT - SUBJECTIVE AND OBJECTIVE BOX
Patient is a 80y old  Male who presents with a chief complaint of GI bleeding (27 Jun 2020 10:02)      INTERVAL HPI: Pt seen and examined. States he is feeling ok, has some anxiety about colonoscopy and prep, pt reassured. Pt was former DDS, has some issues with diarrhea and feel has been getting more deconditionined and will like physical therapy to evaluate this admission.     OVERNIGHT EVENTS: none noted  T(F): 97.9 (06-27-20 @ 12:38), Max: 99.2 (06-26-20 @ 21:32)  HR: 65 (06-27-20 @ 12:38) (65 - 72)  BP: 123/67 (06-27-20 @ 12:38) (123/67 - 157/75)  RR: 20 (06-27-20 @ 12:38) (18 - 20)  SpO2: 98% (06-27-20 @ 12:38) (93% - 98%)  I&O's Summary    26 Jun 2020 07:01  -  27 Jun 2020 07:00  --------------------------------------------------------  IN: 284 mL / OUT: 2130 mL / NET: -1846 mL    27 Jun 2020 07:01  -  27 Jun 2020 20:32  --------------------------------------------------------  IN: 800 mL / OUT: 810 mL / NET: -10 mL        REVIEW OF SYSTEMS:  CONSTITUTIONAL: No fever, weight loss, + fatigue  RESPIRATORY: No cough, wheezing, chills or hemoptysis; No shortness of breath  CARDIOVASCULAR: No chest pain, palpitations, dizziness, or leg swelling  GASTROINTESTINAL: No abdominal or epigastric pain. No nausea, vomiting, or hematemesis; ++ diarrhea or no constipation. No melena or hematochezia overnight  GENITOURINARY: No dysuria, frequency, hematuria, or incontinence  NEUROLOGICAL: No headaches, memory loss, loss of strength, numbness, or tremors  SKIN: No itching, burning, rashes, or lesions   MUSCULOSKELETAL: No joint pain or swelling; No muscle, back, or extremity pain  PSYCHIATRIC: No depression, anxiety, mood swings, or difficulty sleeping      PHYSICAL EXAM:  GENERAL: NAD, pale appearing elderly male, frail, mod protein caloric malnutrition  HEAD:  Atraumatic, Normocephalic  EYES: EOMI, PERRLA, conjunctival pallor  NERVOUS SYSTEM:  Alert & Oriented X3, moving all ext normal strength 3/5 upper and lower ext  CHEST/LUNG: Clear to auscultation bilaterally; No rales, rhonchi, wheezing, or rubs  HEART: Regular rate and rhythm; No murmurs, rubs, or gallops  ABDOMEN: Soft, Nontender, Nondistended; Bowel sounds present  EXTREMITIES:  2+ Peripheral Pulses, No clubbing, cyanosis, or edema  SKIN: warm well perfused    LABS:                        9.1    11.80 )-----------( 280      ( 27 Jun 2020 07:29 )             28.5     06-27    142  |  110<H>  |  13  ----------------------------<  83  3.6   |  26  |  0.74    Ca    8.4<L>      27 Jun 2020 07:29  Phos  2.0     06-27  Mg     1.6     06-27    TPro  5.0<L>  /  Alb  2.3<L>  /  TBili  0.5  /  DBili  x   /  AST  58<H>  /  ALT  41  /  AlkPhos  96  06-27        CAPILLARY BLOOD GLUCOSE                  MEDICATIONS  (STANDING):  ferrous    sulfate 325 milliGRAM(s) Oral daily  finasteride 5 milliGRAM(s) Oral daily  fluticasone propionate 50 MICROgram(s)/spray Nasal Spray 2 Spray(s) Both Nostrils <User Schedule>  levothyroxine 25 MICROGram(s) Oral daily  loratadine 10 milliGRAM(s) Oral daily  metoprolol succinate  milliGRAM(s) Oral daily  montelukast 10 milliGRAM(s) Oral at bedtime  nitrofurantoin macrocrystals (MACRODANTIN) 50 milliGRAM(s) Oral <User Schedule>  pantoprazole  Injectable 40 milliGRAM(s) IV Push two times a day  predniSONE   Tablet 1 milliGRAM(s) Oral four times a day  simvastatin 40 milliGRAM(s) Oral at bedtime  tamsulosin 0.4 milliGRAM(s) Oral at bedtime    MEDICATIONS  (PRN):

## 2020-06-28 NOTE — PROGRESS NOTE ADULT - SUBJECTIVE AND OBJECTIVE BOX
[facility] -- Noe Land, Jamel Sung Pannella, Patel, Savella  Office # [phone]      Follow Up:    Gi bleed, CAD  Subjective/Observations:   No events overnight resting comfortably in bed.  No complaints of chest pain, dyspnea, or palpitations reported. No signs of orthopnea or PND.     REVIEW OF SYSTEMS: All other review of systems is negative unless indicated above    PAST MEDICAL & SURGICAL HISTORY:  Asthma  Melanoma in situ  Hypothyroid  H/O Clostridium difficile infection  Urinary retention  Plaque psoriasis  History of urinary self-catheterization  Arthritis  Prostate CA: seed therapy aug 2009  Hyperlipidemia  Hypertension  CAD (coronary artery disease)  Acute MI  S/P tonsillectomy  H/O hernia repair  S/P cardiac cath      MEDICATIONS  (STANDING):  ferrous    sulfate 325 milliGRAM(s) Oral daily  finasteride 5 milliGRAM(s) Oral daily  fluticasone propionate 50 MICROgram(s)/spray Nasal Spray 2 Spray(s) Both Nostrils <User Schedule>  levothyroxine 25 MICROGram(s) Oral daily  loratadine 10 milliGRAM(s) Oral daily  metoprolol succinate  milliGRAM(s) Oral daily  montelukast 10 milliGRAM(s) Oral at bedtime  nitrofurantoin macrocrystals (MACRODANTIN) 50 milliGRAM(s) Oral <User Schedule>  pantoprazole  Injectable 40 milliGRAM(s) IV Push two times a day  polyethylene glycol/electrolyte Solution. 4000 milliLiter(s) Oral once  predniSONE   Tablet 1 milliGRAM(s) Oral four times a day  simvastatin 40 milliGRAM(s) Oral at bedtime  tamsulosin 0.4 milliGRAM(s) Oral at bedtime    MEDICATIONS  (PRN):      Allergies    cephalexin (Urticaria)  clindamycin (Diarrhea)  erythromycin (Other)  Methotrexate Sodium (Unknown)  penicillins (Hives)  sporalin (oxypsoralin/PUVA) (Other)  sulfa drugs (Rash)    Intolerances        Vital Signs Last 24 Hrs  T(C): 36.7 (28 Jun 2020 05:00), Max: 37.3 (27 Jun 2020 21:38)  T(F): 98 (28 Jun 2020 05:00), Max: 99.1 (27 Jun 2020 21:38)  HR: 80 (28 Jun 2020 05:00) (65 - 80)  BP: 140/66 (28 Jun 2020 05:00) (123/67 - 140/66)  BP(mean): --  RR: 18 (28 Jun 2020 05:00) (18 - 20)  SpO2: 99% (28 Jun 2020 05:00) (98% - 99%)    I&O's Summary    27 Jun 2020 07:01  -  28 Jun 2020 07:00  --------------------------------------------------------  IN: 800 mL / OUT: 1110 mL / NET: -310 mL          PHYSICAL EXAM:  TELE: SR  Constitutional: NAD, awake and alert, well-developed  HEENT: Moist Mucous Membranes, Anicteric  Pulmonary: Non-labored, breath sounds are clear bilaterally, No wheezing, crackles or rhonchi  Cardiovascular: Regular, S1 and S2 nl, No murmurs, rubs, gallops or clicks  Gastrointestinal: Bowel Sounds present, soft, nontender.   Lymph: No lymphadenopathy. No peripheral edema.  Skin: No visible rashes or ulcers.  Psych:  Mood & affect appropriate    LABS: All Labs Reviewed:                        9.7    11.26 )-----------( 276      ( 28 Jun 2020 06:53 )             30.9                         9.1    11.80 )-----------( 280      ( 27 Jun 2020 07:29 )             28.5                         9.0    x     )-----------( x        ( 26 Jun 2020 16:54 )             27.9     28 Jun 2020 06:53    143    |  112    |  10     ----------------------------<  81     3.5     |  26     |  0.71   27 Jun 2020 07:29    142    |  110    |  13     ----------------------------<  83     3.6     |  26     |  0.74   26 Jun 2020 07:17    141    |  106    |  20     ----------------------------<  118    3.1     |  25     |  1.00     Ca    8.2        28 Jun 2020 06:53  Ca    8.4        27 Jun 2020 07:29  Ca    8.4        26 Jun 2020 07:17  Phos  2.5       28 Jun 2020 06:53  Phos  2.0       27 Jun 2020 07:29  Phos  1.8       26 Jun 2020 07:17  Mg     1.6       27 Jun 2020 07:29  Mg     1.6       26 Jun 2020 07:17    TPro  5.3    /  Alb  2.4    /  TBili  0.7    /  DBili  x      /  AST  53     /  ALT  46     /  AlkPhos  107    28 Jun 2020 06:53  TPro  5.0    /  Alb  2.3    /  TBili  0.5    /  DBili  x      /  AST  58     /  ALT  41     /  AlkPhos  96     27 Jun 2020 07:29  TPro  6.5    /  Alb  3.1    /  TBili  0.3    /  DBili  x      /  AST  95     /  ALT  51     /  AlkPhos  107    25 Jun 2020 11:48        12 Lead ECG:   Ventricular Rate 69 BPM  Atrial Rate 69 BPM  P-R Interval 134 ms  QRS Duration 90 ms  Q-T Interval 440 ms  QTC Calculation(Bezet) 471 ms  P Axis 61 degrees  R Axis -31 degrees  T Axis 133 degrees  Diagnosis Line Normal sinus rhythm  Left axis deviation  Left ventricular hypertrophy with repolarization abnormality  Inferior infarct (cited on or before 29-MAR-2010)  Nonspecific ST abnormality  Abnormal ECG  Confirmed by francois Kaplan ([id number]) on 6/25/2020 2:14:26 PM (06-25-20 @ 12:09)    < from: Xray Chest 1 View- PORTABLE-Routine (06.25.20 @ 12:15) >  Heart magnified by projection. Atherosclerotic aorta. Occasional bilateral calcified granulomas. No acute infiltrate or pleural effusion.    Impression: No active disease  < end of copied text > [facility] -- Noe Land, Jamel Sung Pannella, Patel, Savella  Office # [phone]      Follow Up:    Gi bleed, CAD  Subjective/Observations:   Interim events noted. Now resting comfortably in bed.  No complaints of chest pain, dyspnea, or palpitations reported. No signs of orthopnea or PND.     REVIEW OF SYSTEMS: All other review of systems is negative unless indicated above    PAST MEDICAL & SURGICAL HISTORY:  Asthma  Melanoma in situ  Hypothyroid  H/O Clostridium difficile infection  Urinary retention  Plaque psoriasis  History of urinary self-catheterization  Arthritis  Prostate CA: seed therapy aug 2009  Hyperlipidemia  Hypertension  CAD (coronary artery disease)  Acute MI  S/P tonsillectomy  H/O hernia repair  S/P cardiac cath      MEDICATIONS  (STANDING):  ferrous    sulfate 325 milliGRAM(s) Oral daily  finasteride 5 milliGRAM(s) Oral daily  fluticasone propionate 50 MICROgram(s)/spray Nasal Spray 2 Spray(s) Both Nostrils <User Schedule>  levothyroxine 25 MICROGram(s) Oral daily  loratadine 10 milliGRAM(s) Oral daily  metoprolol succinate  milliGRAM(s) Oral daily  montelukast 10 milliGRAM(s) Oral at bedtime  nitrofurantoin macrocrystals (MACRODANTIN) 50 milliGRAM(s) Oral <User Schedule>  pantoprazole  Injectable 40 milliGRAM(s) IV Push two times a day  polyethylene glycol/electrolyte Solution. 4000 milliLiter(s) Oral once  predniSONE   Tablet 1 milliGRAM(s) Oral four times a day  simvastatin 40 milliGRAM(s) Oral at bedtime  tamsulosin 0.4 milliGRAM(s) Oral at bedtime    MEDICATIONS  (PRN):      Allergies    cephalexin (Urticaria)  clindamycin (Diarrhea)  erythromycin (Other)  Methotrexate Sodium (Unknown)  penicillins (Hives)  sporalin (oxypsoralin/PUVA) (Other)  sulfa drugs (Rash)    Intolerances        Vital Signs Last 24 Hrs  T(C): 36.7 (28 Jun 2020 05:00), Max: 37.3 (27 Jun 2020 21:38)  T(F): 98 (28 Jun 2020 05:00), Max: 99.1 (27 Jun 2020 21:38)  HR: 80 (28 Jun 2020 05:00) (65 - 80)  BP: 140/66 (28 Jun 2020 05:00) (123/67 - 140/66)  BP(mean): --  RR: 18 (28 Jun 2020 05:00) (18 - 20)  SpO2: 99% (28 Jun 2020 05:00) (98% - 99%)    I&O's Summary    27 Jun 2020 07:01  -  28 Jun 2020 07:00  --------------------------------------------------------  IN: 800 mL / OUT: 1110 mL / NET: -310 mL          PHYSICAL EXAM:  TELE: SR  Constitutional: NAD, awake and alert, well-developed  HEENT: Moist Mucous Membranes, Anicteric  Pulmonary: Non-labored, breath sounds are clear bilaterally, No wheezing, crackles or rhonchi  Cardiovascular: Regular, S1 and S2 nl, No murmurs, rubs, gallops or clicks  Gastrointestinal: Bowel Sounds present, soft, nontender.   Lymph: No lymphadenopathy. No peripheral edema.  Skin: No visible rashes or ulcers.  Psych:  Mood & affect appropriate    LABS: All Labs Reviewed:                        9.7    11.26 )-----------( 276      ( 28 Jun 2020 06:53 )             30.9                         9.1    11.80 )-----------( 280      ( 27 Jun 2020 07:29 )             28.5                         9.0    x     )-----------( x        ( 26 Jun 2020 16:54 )             27.9     28 Jun 2020 06:53    143    |  112    |  10     ----------------------------<  81     3.5     |  26     |  0.71   27 Jun 2020 07:29    142    |  110    |  13     ----------------------------<  83     3.6     |  26     |  0.74   26 Jun 2020 07:17    141    |  106    |  20     ----------------------------<  118    3.1     |  25     |  1.00     Ca    8.2        28 Jun 2020 06:53  Ca    8.4        27 Jun 2020 07:29  Ca    8.4        26 Jun 2020 07:17  Phos  2.5       28 Jun 2020 06:53  Phos  2.0       27 Jun 2020 07:29  Phos  1.8       26 Jun 2020 07:17  Mg     1.6       27 Jun 2020 07:29  Mg     1.6       26 Jun 2020 07:17    TPro  5.3    /  Alb  2.4    /  TBili  0.7    /  DBili  x      /  AST  53     /  ALT  46     /  AlkPhos  107    28 Jun 2020 06:53  TPro  5.0    /  Alb  2.3    /  TBili  0.5    /  DBili  x      /  AST  58     /  ALT  41     /  AlkPhos  96     27 Jun 2020 07:29  TPro  6.5    /  Alb  3.1    /  TBili  0.3    /  DBili  x      /  AST  95     /  ALT  51     /  AlkPhos  107    25 Jun 2020 11:48        12 Lead ECG:   Ventricular Rate 69 BPM  Atrial Rate 69 BPM  P-R Interval 134 ms  QRS Duration 90 ms  Q-T Interval 440 ms  QTC Calculation(Bezet) 471 ms  P Axis 61 degrees  R Axis -31 degrees  T Axis 133 degrees  Diagnosis Line Normal sinus rhythm  Left axis deviation  Left ventricular hypertrophy with repolarization abnormality  Inferior infarct (cited on or before 29-MAR-2010)  Nonspecific ST abnormality  Abnormal ECG  Confirmed by francois Kaplan ([id number]) on 6/25/2020 2:14:26 PM (06-25-20 @ 12:09)    < from: Xray Chest 1 View- PORTABLE-Routine (06.25.20 @ 12:15) >  Heart magnified by projection. Atherosclerotic aorta. Occasional bilateral calcified granulomas. No acute infiltrate or pleural effusion.    Impression: No active disease  < end of copied text >

## 2020-06-28 NOTE — CHART NOTE - NSCHARTNOTEFT_GEN_A_CORE
Called by RN for Pt c/o blood with self catheterization. Pt states he noticed a little bit of blood yesterday and more today when self catheterizing. States he has not noted blood before. Admits that both times he noticed very little urinary output. Denies penile pain, sob, cp, new dizziness. On physical exam there was little blood noted at the urethral opening; pt was non-tender.      T(C): 37 (06-28-20 @ 12:52), Max: 37.3 (06-27-20 @ 21:38)  HR: 72 (06-28-20 @ 12:52) (67 - 80)  BP: 146/64 (06-28-20 @ 12:52) (132/80 - 146/64)  RR: 20 (06-28-20 @ 12:52) (18 - 20)  SpO2: 96% (06-28-20 @ 12:52) (96% - 99%)  Wt(kg): --    LABS:                        9.7    11.26 )-----------( 276      ( 28 Jun 2020 06:53 )             30.9     06-28    143  |  112<H>  |  10  ----------------------------<  81  3.5   |  26  |  0.71    Ca    8.2<L>      28 Jun 2020 06:53  Phos  2.5     06-28  Mg     1.6     06-27    TPro  5.3<L>  /  Alb  2.4<L>  /  TBili  0.7  /  DBili  x   /  AST  53<H>  /  ALT  46  /  AlkPhos  107  06-28                Assessment/Plan  79 YO M with pmhx of CAD, HTN, HLD, prostate cancer (seed radiation 2009), MI (nov 2000, angioplasty w 3 stents to the right coronary artery), rheumatoid and psoriatic arthritis, plaque psoriasis, b/l hip replacements (left 1998, right 2002), C. diff in 2014, urinary retention with self catheretization hypothyroidism, mild asthma, melanoma in situ, admitted for GIB. Pt now with blood with self-cath.    1. Vitals stable; f/u STAT H&H  2. STAT Bladder Scan ordered  3. Discussed with attending Dr. Monge who agrees with plan -- Will consult urology  4. RN to call with any changes    [id number]  juan ramon

## 2020-06-28 NOTE — PROGRESS NOTE ADULT - ASSESSMENT
81y/o M PMH of CAD, HTN, HLD, prostate cancer (seed radiation 2009), MI (nov 2000, angioplasty w 3 stents to the right coronary artery), rheumatoid and psoriatic arthritis, plaque psoriasis, b/l hip replacements (left 1998, right 2002), C diff in 2014, urinary retention with self catheretization hypothyroidism, mild asthma, melanoma in situ presents for GIB. Pt states he noticed right red blood in his stools for 6 months. Pt has been feeling weakness, weight loss of 19 pounds since June 2019. Pt sent in by outpatient hematologist Dr. Bergeron given low Hgb outpatient. (6.8 on 6/24) Pt denies SOB or GEORGES, however feels weak when pushing heavy objects. Pt was scheduled with his GI doctor Dr. Mullen for outpatient colonoscopy in March 2020. However, colonoscopy canceled due to COVID19. Pt self catheterizes 5-6 times a day.    CARDIAC OPTIMIZATION/CLEARANCE  - There is no evidence of acute ischemia.  - EKG is unchanged when compared to prior  - Pt w/o anginal complaints  - There is no evidence of significant arrhythmia.  - There is no evidence for meaningful  volume overload.  - TTE 12/1/18 mild AS, LVD,45 %, no need to repeat  - Patient is optimized for possible colonoscopy procedure    CAD S/P stent  - Stable  - Continue Toprol  mg PO daily  - Continue Statin     HTN  - BP: 140/66 (06-28-20 @ 05:00) (123/67 - 140/66)  - Continue BB at home dose  - BUN and creatinine are normalized   - Hold HCTZ in setting of GI Bleed, resume prior to discharge  - Monitor routine hemodynamics.     HLD  - Continue statin    Anemia  - Patient remains anemic  -stable  - S/P PRBCs  - Trend CBC  - Would transfuse to keep Hb>8 and watch volume status     MARY  - Improved.  - Holding HCTZ  for now, resume prior to discharge  - Monitor and replete lytes, keep K>4, Mg>2.  - All other medical needs as per primary team.  - Other cardiovascular workup will depend on clinical course.  - Will continue to follow.    Rubin Justice DNP, ANP-c  Cardiology   Spectra #[id number] [phone]

## 2020-06-28 NOTE — PROGRESS NOTE ADULT - SUBJECTIVE AND OBJECTIVE BOX
All interim records and events noted.    up in chair, reports sig bleed from paez catheter today, wait Urology eval      MEDICATIONS  (STANDING):  ferrous    sulfate 325 milliGRAM(s) Oral daily  finasteride 5 milliGRAM(s) Oral daily  fluticasone propionate 50 MICROgram(s)/spray Nasal Spray 2 Spray(s) Both Nostrils <User Schedule>  levothyroxine 25 MICROGram(s) Oral daily  loratadine 10 milliGRAM(s) Oral daily  metoprolol succinate  milliGRAM(s) Oral daily  montelukast 10 milliGRAM(s) Oral at bedtime  nitrofurantoin macrocrystals (MACRODANTIN) 50 milliGRAM(s) Oral <User Schedule>  pantoprazole  Injectable 40 milliGRAM(s) IV Push two times a day  polyethylene glycol/electrolyte Solution. 4000 milliLiter(s) Oral once  predniSONE   Tablet 1 milliGRAM(s) Oral four times a day  simvastatin 40 milliGRAM(s) Oral at bedtime  tamsulosin 0.4 milliGRAM(s) Oral at bedtime    MEDICATIONS  (PRN):      Vital Signs Last 24 Hrs  T(C): 36.7 (28 Jun 2020 05:00), Max: 37.3 (27 Jun 2020 21:38)  T(F): 98 (28 Jun 2020 05:00), Max: 99.1 (27 Jun 2020 21:38)  HR: 80 (28 Jun 2020 05:00) (65 - 80)  BP: 140/66 (28 Jun 2020 05:00) (123/67 - 140/66)  BP(mean): --  RR: 18 (28 Jun 2020 05:00) (18 - 20)  SpO2: 99% (28 Jun 2020 05:00) (98% - 99%)    PHYSICAL EXAM  General: well developed  well nourished, in no acute distress  Head: atraumatic, normocephalic  ENT: sclera anicteric, buccal mucosa moist  Neck: supple, trachea midline  CV: S1 S2, regular rate and rhythm  Lungs: clear to auscultation, no wheezes/rhonchi  Abdomen: soft, nontender, bowel sounds present, no palpable masses  Extrem: no clubbing/cyanosis/edema  Skin: no significant increased ecchymosis/petechiae  Neuro: alert and oriented X3,  no focal deficits      LABS:             9.7    11.26 )-----------( 276      ( 06-28 @ 06:53 )             30.9                9.1    11.80 )-----------( 280      ( 06-27 @ 07:29 )             28.5                9.0    x     )-----------( x        ( 06-26 @ 16:54 )             27.9                7.4    11.96 )-----------( 263      ( 06-26 @ 07:17 )             23.3                7.7    x     )-----------( x        ( 06-25 @ 18:21 )             24.6       06-28    143  |  112<H>  |  10  ----------------------------<  81  3.5   |  26  |  0.71    Ca    8.2<L>      28 Jun 2020 06:53  Phos  2.5     06-28  Mg     1.6     06-27    TPro  5.3<L>  /  Alb  2.4<L>  /  TBili  0.7  /  DBili  x   /  AST  53<H>  /  ALT  46  /  AlkPhos  107  06-28 06-25 @ 11:48  PT12.7 INR1.13  PTT31.7      RADIOLOGY & ADDITIONAL STUDIES:    IMPRESSION/RECOMMENDATIONS:

## 2020-06-28 NOTE — PROGRESS NOTE ADULT - SUBJECTIVE AND OBJECTIVE BOX
Patient is a 80y old  Male who presents with a chief complaint of GI bleed (27 Jun 2020 17:31)      INTERVAL HPI: Pt seen and examined.  States he is frustrated with inability to successfully catherize without resistance, blood and dismcofrt today. Also concerned about prep for colonoscopy, after reassurance pt more comfortable. Denies any other acute complaints at this time.     OVERNIGHT EVENTS: none noted  T(F): 98.6 (06-28-20 @ 12:52), Max: 99.1 (06-27-20 @ 21:38)  HR: 72 (06-28-20 @ 12:52) (67 - 80)  BP: 146/64 (06-28-20 @ 12:52) (132/80 - 146/64)  RR: 20 (06-28-20 @ 12:52) (18 - 20)  SpO2: 96% (06-28-20 @ 12:52) (96% - 99%)  I&O's Summary    27 Jun 2020 07:01  -  28 Jun 2020 07:00  --------------------------------------------------------  IN: 800 mL / OUT: 1110 mL / NET: -310 mL        REVIEW OF SYSTEMS:  CONSTITUTIONAL: No fever, weight loss, or fatigue  RESPIRATORY: No cough, wheezing, chills or hemoptysis; No shortness of breath  CARDIOVASCULAR: No chest pain, palpitations, dizziness, or leg swelling  GASTROINTESTINAL: No abdominal or epigastric pain. No nausea, vomiting, or hematemesis; + diarrhea No constipation. No melena or hematochezia.  GENITOURINARY: + hematuric clots, urge, discomfort  NEUROLOGICAL: No headaches, memory loss, loss of strength, numbness, or tremors  SKIN: No itching, burning, rashes, or lesions   MUSCULOSKELETAL: No joint pain or swelling; No muscle, back, or extremity pain  PSYCHIATRIC: No depression, anxiety, mood swings, or difficulty sleeping      PHYSICAL EXAM:  GENERAL: NAD, well-groomed, elder  NERVOUS SYSTEM:  Alert & Oriented X3,  Motor Strength 3/5 B/L upper and lower extremities  CHEST/LUNG: Clear to percussion bilaterally; No rales, rhonchi, wheezing, or rubs  HEART: Regular rate and rhythm; No murmurs, rubs, or gallops  ABDOMEN: Soft, Nontender, Nondistended; Bowel sounds present  EXTREMITIES:  2+ Peripheral Pulses, No clubbing, cyanosis, or edema  SKIN: No rashes or lesions    LABS:                        9.6    x     )-----------( x        ( 28 Jun 2020 14:16 )             30.8     06-28    143  |  112<H>  |  10  ----------------------------<  81  3.5   |  26  |  0.71    Ca    8.2<L>      28 Jun 2020 06:53  Phos  2.5     06-28  Mg     1.6     06-27    TPro  5.3<L>  /  Alb  2.4<L>  /  TBili  0.7  /  DBili  x   /  AST  53<H>  /  ALT  46  /  AlkPhos  107  06-28        CAPILLARY BLOOD GLUCOSE                  MEDICATIONS  (STANDING):  ferrous    sulfate 325 milliGRAM(s) Oral daily  finasteride 5 milliGRAM(s) Oral daily  fluticasone propionate 50 MICROgram(s)/spray Nasal Spray 2 Spray(s) Both Nostrils <User Schedule>  levothyroxine 25 MICROGram(s) Oral daily  loratadine 10 milliGRAM(s) Oral daily  metoprolol succinate  milliGRAM(s) Oral daily  montelukast 10 milliGRAM(s) Oral at bedtime  nitrofurantoin macrocrystals (MACRODANTIN) 50 milliGRAM(s) Oral <User Schedule>  pantoprazole  Injectable 40 milliGRAM(s) IV Push two times a day  polyethylene glycol/electrolyte Solution. 4000 milliLiter(s) Oral once  predniSONE   Tablet 1 milliGRAM(s) Oral four times a day  simvastatin 40 milliGRAM(s) Oral at bedtime  tamsulosin 0.4 milliGRAM(s) Oral at bedtime    MEDICATIONS  (PRN):

## 2020-06-28 NOTE — PROGRESS NOTE ADULT - PROBLEM SELECTOR PLAN 2
pt is considered a intermediate risk candidate for a low to intermediate risk procedure, pt is medically optimized at this time, apprec cardio optimization

## 2020-06-28 NOTE — CONSULT NOTE ADULT - SUBJECTIVE AND OBJECTIVE BOX
CHIEF COMPLAINT: hematuria with self cath    HISTORY OF PRESENT ILLNESS:  79 YO M with pmhx of CAD, HTN, HLD, prostate cancer (seed radiation 2009), MI (nov 2000, angioplasty w 3 stents to the right coronary artery), rheumatoid and psoriatic arthritis, plaque psoriasis, b/l hip replacements (left 1998, right 2002), C diff in 2014, urinary retention with self catheretization hypothyroidism, mild asthma, melanoma in situ presents for GIB. Pt states he noticed right red blood in his stools for 6 months. Pt has been feeling weakness, weight loss of 19 pounds since June 2019. Pt sent in by outpatient hematologist Dr. Bergeron given low Hgb outpatient. (6.8 on 6/24) Pt denies SOB or GEOGRES, however feels weak when pushing heavy objects. Pt was scheduled with his GI doctor Dr. Mullen for outpatient colonoscopy in March 2020. However, colonoscopy canceled due to COVID19. Pt self catheterizes 5-6 times a day. Pt admits to burning prior to catheterization when his bladder is full. Pt denies fever, chills, hematemesis, hemoptysis, hematuria, melena, SOB, GEORGES, CP, abdominal pain.   Pt is s/p cysto this week, that showed nml bladder mucosa. Since admission to hospital, he has had trouble with self cath, and today had a lot of blood when he cathed. A paez was then placed, which is draining well    PAST MEDICAL & SURGICAL HISTORY:  Asthma  Melanoma in situ  Hypothyroid  H/O Clostridium difficile infection  Urinary retention  Plaque psoriasis  History of urinary self-catheterization  Arthritis  Prostate CA: seed therapy aug 2009  Hyperlipidemia  Hypertension  CAD (coronary artery disease)  Acute MI  S/P tonsillectomy  H/O hernia repair  S/P cardiac cath      REVIEW OF SYSTEMS:    CONSTITUTIONAL: No fever, weight loss, + fatigue  RESPIRATORY: No cough, wheezing, chills or hemoptysis; No shortness of breath  CARDIOVASCULAR: No chest pain, palpitations, dizziness, or leg swelling  GASTROINTESTINAL: No abdominal or epigastric pain. No nausea, vomiting, or hematemesis; ++ diarrhea or no constipation. No melena or hematochezia overnight  GENITOURINARY: No dysuria, frequency, hematuria, or incontinence  NEUROLOGICAL: No headaches, memory loss, loss of strength, numbness, or tremors  SKIN: No itching, burning, rashes, or lesions   MUSCULOSKELETAL: No joint pain or swelling; No muscle, back, or extremity pain  PSYCHIATRIC: No depression, anxiety, mood swings, or difficulty sleeping      MEDICATIONS  (STANDING):  ferrous    sulfate 325 milliGRAM(s) Oral daily  finasteride 5 milliGRAM(s) Oral daily  fluticasone propionate 50 MICROgram(s)/spray Nasal Spray 2 Spray(s) Both Nostrils <User Schedule>  levothyroxine 25 MICROGram(s) Oral daily  loratadine 10 milliGRAM(s) Oral daily  metoprolol succinate  milliGRAM(s) Oral daily  montelukast 10 milliGRAM(s) Oral at bedtime  nitrofurantoin macrocrystals (MACRODANTIN) 50 milliGRAM(s) Oral <User Schedule>  pantoprazole  Injectable 40 milliGRAM(s) IV Push two times a day  phenazopyridine 100 milliGRAM(s) Oral every 8 hours  predniSONE   Tablet 1 milliGRAM(s) Oral four times a day  simvastatin 40 milliGRAM(s) Oral at bedtime  tamsulosin 0.4 milliGRAM(s) Oral at bedtime    MEDICATIONS  (PRN):      Allergies    cephalexin (Urticaria)  clindamycin (Diarrhea)  erythromycin (Other)  Methotrexate Sodium (Unknown)  penicillins (Hives)  sporalin (oxypsoralin/PUVA) (Other)  sulfa drugs (Rash)    Intolerances        SOCIAL HISTORY:    FAMILY HISTORY:  FH: asthma  FH: Alzheimers disease      Vital Signs Last 24 Hrs  T(C): 36.5 (28 Jun 2020 21:07), Max: 37 (28 Jun 2020 12:52)  T(F): 97.7 (28 Jun 2020 21:07), Max: 98.6 (28 Jun 2020 12:52)  HR: 74 (28 Jun 2020 21:07) (71 - 80)  BP: 146/78 (28 Jun 2020 21:07) (140/66 - 146/78)  BP(mean): --  RR: 18 (28 Jun 2020 21:07) (18 - 20)  SpO2: 98% (28 Jun 2020 21:07) (96% - 99%)    PHYSICAL EXAM:    Constitutional: NAD, well-developed  HEENT: NADJA, EOMI, Normal Hearing, MMM  Neck: No LAD, No JVD  Back: Normal spine flexure, No CVA tenderness  Respiratory: not using accessory muscles  Cardiovascular: RRR  Abd: BS+, soft, NT/ND, No CVAT  : Normal phallus,open meatus,bilateral descended testes, no masses  Extremities: No peripheral edema  Vascular: 2+ peripheral pulses  Neurological: A/O x 3, no focal deficits  Psychiatric: Normal mood, normal affect  Musculoskeletal: 5/5 strength b/l upper and lower extremities  Skin: No rashes    LABS:                        9.6    x     )-----------( x        ( 28 Jun 2020 14:16 )             30.8     06-28    143  |  112<H>  |  10  ----------------------------<  81  3.5   |  26  |  0.71    Ca    8.2<L>      28 Jun 2020 06:53  Phos  2.5     06-28  Mg     1.6     06-27    TPro  5.3<L>  /  Alb  2.4<L>  /  TBili  0.7  /  DBili  x   /  AST  53<H>  /  ALT  46  /  AlkPhos  107  06-28        Urine Culture:     RADIOLOGY & ADDITIONAL STUDIE

## 2020-06-28 NOTE — PROGRESS NOTE ADULT - ASSESSMENT
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

## 2020-06-28 NOTE — CONSULT NOTE ADULT - PROBLEM SELECTOR RECOMMENDATION 9
This is chronic, and given the recent nml cysto, the pt likely induced trauma with self cath  Continue paez for now

## 2020-06-29 NOTE — PROGRESS NOTE ADULT - ASSESSMENT
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 Monge (hospitalist)

## 2020-06-29 NOTE — PHYSICAL THERAPY INITIAL EVALUATION ADULT - GAIT TRAINING, PT EVAL
Patient will ambulate x 50 feet with RW with min A x 1 in 1 week in order to ambulate to bathroom with assistance

## 2020-06-29 NOTE — PROGRESS NOTE ADULT - PROBLEM SELECTOR PLAN 1
Acute on chronic GI bleed, pt has history of bright red blood per rectum since may 2019  -continue IV protonix BID   +FOBT  - apprec GI recs: colonoscopy planned for monday, golytely prep tomorrow at 4pm,   -apprec cardio optimization, medical optimization to follow tomorrow Acute on chronic GI bleed, pt has history of bright red blood per rectum since may 2019  -continue IV protonix BID   +FOBT  - s/p colonoscopy found to have colonic mass  -apprec gen surg recs dr arenas, ct scans to eval for malignancy  -apprec onco recs

## 2020-06-29 NOTE — PROGRESS NOTE ADULT - ASSESSMENT
79 YO M with pmhx of CAD, HTN, HLD, prostate cancer (seed radiation 2009), MI (nov 2000, angioplasty w 3 stents to the right coronary artery), rheumatoid and psoriatic arthritis, plaque psoriasis, b/l hip replacements (left 1998, right 2002), C. diff in 2014, urinary retention with self catheretization hypothyroidism, mild asthma, melanoma in situ, admitted for GIB 81 YO M with pmhx of CAD, HTN, HLD, prostate cancer (seed radiation 2009), MI (nov 2000, angioplasty w 3 stents to the right coronary artery), rheumatoid and psoriatic arthritis, plaque psoriasis, b/l hip replacements (left 1998, right 2002), C. diff in 2014, urinary retention with self catheretization hypothyroidism, mild asthma, melanoma in situ, admitted for GIB s/p colonoscopy found to have colonic mass, CT pending

## 2020-06-29 NOTE — CHART NOTE - NSCHARTNOTEFT_GEN_A_CORE
Assessment:     Factors impacting intake: [ ] none [ ] nausea  [ ] vomiting [ ] diarrhea [ ] constipation  [ ]chewing problems [ ] swallowing issues  [ ] other:     Diet Presciption: Diet, NPO after Midnight:      NPO Start Date: 28-Jun-2020,   NPO Start Time: 23:59  Except Medications (06-28-20 @ 23:02)    Intake:     Current Weight: Weight (kg): 47.2 (06-25 @ 11:10)  % Weight Change    Pertinent Medications: MEDICATIONS  (STANDING):  ferrous    sulfate 325 milliGRAM(s) Oral daily  finasteride 5 milliGRAM(s) Oral daily  fluticasone propionate 50 MICROgram(s)/spray Nasal Spray 2 Spray(s) Both Nostrils <User Schedule>  levothyroxine 25 MICROGram(s) Oral daily  loratadine 10 milliGRAM(s) Oral daily  metoprolol succinate  milliGRAM(s) Oral daily  montelukast 10 milliGRAM(s) Oral at bedtime  nitrofurantoin macrocrystals (MACRODANTIN) 50 milliGRAM(s) Oral <User Schedule>  pantoprazole  Injectable 40 milliGRAM(s) IV Push two times a day  phenazopyridine 100 milliGRAM(s) Oral every 8 hours  predniSONE   Tablet 1 milliGRAM(s) Oral four times a day  simvastatin 40 milliGRAM(s) Oral at bedtime  tamsulosin 0.4 milliGRAM(s) Oral at bedtime    MEDICATIONS  (PRN):    Pertinent Labs: 06-28 Na143 mmol/L Glu 81 mg/dL K+ 3.5 mmol/L Cr  0.71 mg/dL BUN 10 mg/dL 06-28 Phos 2.5 mg/dL 06-28 Alb 2.4 g/dL<L>     CAPILLARY BLOOD GLUCOSE        Skin:     Estimated Needs:   [ ] no change since previous assessment  [ ] recalculated:     Previous Nutrition Diagnosis:   [ ] Inadequate Energy Intake [ ]Inadequate Oral Intake [ ] Excessive Energy Intake   [ ] Underweight [ ] Increased Nutrient Needs [ ] Overweight/Obesity   [ ] Altered GI Function [ ] Unintended Weight Loss [ ] Food & Nutrition Related Knowledge Deficit [ ] Malnutrition     Nutrition Diagnosis is [ ] ongoing  [ ] resolved [ ] not applicable     New Nutrition Diagnosis: [ ] not applicable       Interventions:   Recommend  [ ] Change Diet To:  [ ] Nutrition Supplement  [ ] Nutrition Support  [ ] Other:     Monitoring and Evaluation:   [ ] PO intake [ x ] Tolerance to diet prescription [ x ] weights [ x ] labs[ x ] follow up per protocol  [ ] other: Assessment: Pt due for nutrition f/u. Chart reviewed, hospital course noted.    Pt off the unit for EGD/Colonoscopy at this time. +loose multiple BMs due to colonoscopy prep. Fair appetite/intake on clear liquid diet >50% of tray.     Factors impacting intake: [ ] none [ ] nausea  [ ] vomiting [ ] diarrhea [ ] constipation  [ ]chewing problems [ ] swallowing issues  [X] other: altered GI function    Diet, NPO after Midnight:      NPO Start Date: 28-Jun-2020,   NPO Start Time: 23:59  Except Medications (06-28-20 @ 23:02)    Intake: fair    Current Weight: Weight (kg): 47.2 (06-25 @ 11:10)  % Weight Change    Pertinent Medications: MEDICATIONS  (STANDING):  ferrous    sulfate 325 milliGRAM(s) Oral daily  finasteride 5 milliGRAM(s) Oral daily  fluticasone propionate 50 MICROgram(s)/spray Nasal Spray 2 Spray(s) Both Nostrils <User Schedule>  levothyroxine 25 MICROGram(s) Oral daily  loratadine 10 milliGRAM(s) Oral daily  metoprolol succinate  milliGRAM(s) Oral daily  montelukast 10 milliGRAM(s) Oral at bedtime  nitrofurantoin macrocrystals (MACRODANTIN) 50 milliGRAM(s) Oral <User Schedule>  pantoprazole  Injectable 40 milliGRAM(s) IV Push two times a day  phenazopyridine 100 milliGRAM(s) Oral every 8 hours  predniSONE   Tablet 1 milliGRAM(s) Oral four times a day  simvastatin 40 milliGRAM(s) Oral at bedtime  tamsulosin 0.4 milliGRAM(s) Oral at bedtime    MEDICATIONS  (PRN):    Pertinent Labs: 06-28 Na143 mmol/L Glu 81 mg/dL K+ 3.5 mmol/L Cr  0.71 mg/dL BUN 10 mg/dL 06-28 Phos 2.5 mg/dL 06-28 Alb 2.4 g/dL<L>     CAPILLARY BLOOD GLUCOSE        Skin: sacrum stage 1 pressure injury, no edema    Estimated Needs:   [X] no change since previous assessment  [ ] recalculated:     Previous Nutrition Diagnosis:   [ ] Inadequate Energy Intake [ ]Inadequate Oral Intake [ ] Excessive Energy Intake   [ ] Underweight [ ] Increased Nutrient Needs [ ] Overweight/Obesity   [ ] Altered GI Function [ ] Unintended Weight Loss [ ] Food & Nutrition Related Knowledge Deficit [X] Malnutrition     Nutrition Diagnosis is [X] ongoing  [ ] resolved [ ] not applicable     New Nutrition Diagnosis: [X] not applicable       Interventions:   Recommend  [ ] Change Diet To:  [X] Nutrition Supplement: Add Ensure Enlive after EGD/colonoscopy. MD Monge is aware of nutrition recommendations.  [ ] Nutrition Support  [X Other: Advance diet pending results of EGD/colonoscopy. Encourage po intake.     Monitoring and Evaluation:   [X] PO intake [ x ] Tolerance to diet prescription [ x ] weights [ x ] labs[ x ] follow up per protocol  [ ] other:

## 2020-06-29 NOTE — PROGRESS NOTE ADULT - SUBJECTIVE AND OBJECTIVE BOX
INTERVAL HPI/OVERNIGHT EVENTS: rare suprapubic discomfort, tolerating paez    MEDICATIONS  (STANDING):  ferrous    sulfate 325 milliGRAM(s) Oral daily  finasteride 5 milliGRAM(s) Oral daily  fluticasone propionate 50 MICROgram(s)/spray Nasal Spray 2 Spray(s) Both Nostrils <User Schedule>  levothyroxine 25 MICROGram(s) Oral daily  loratadine 10 milliGRAM(s) Oral daily  metoprolol succinate  milliGRAM(s) Oral daily  montelukast 10 milliGRAM(s) Oral at bedtime  nitrofurantoin macrocrystals (MACRODANTIN) 50 milliGRAM(s) Oral <User Schedule>  pantoprazole  Injectable 40 milliGRAM(s) IV Push two times a day  phenazopyridine 100 milliGRAM(s) Oral every 8 hours  predniSONE   Tablet 1 milliGRAM(s) Oral four times a day  simvastatin 40 milliGRAM(s) Oral at bedtime  tamsulosin 0.4 milliGRAM(s) Oral at bedtime    MEDICATIONS  (PRN):        Vital Signs Last 24 Hrs  T(C): 36.8 (29 Jun 2020 04:55), Max: 37 (28 Jun 2020 12:52)  T(F): 98.3 (29 Jun 2020 04:55), Max: 98.6 (28 Jun 2020 12:52)  HR: 69 (29 Jun 2020 04:55) (69 - 74)  BP: 132/65 (29 Jun 2020 04:55) (132/65 - 146/78)  BP(mean): --  RR: 17 (29 Jun 2020 04:55) (17 - 20)  SpO2: 99% (29 Jun 2020 04:55) (96% - 99%)        LABS:                        9.7    12.91 )-----------( 285      ( 29 Jun 2020 06:24 )             30.5     06-28    143  |  112<H>  |  10  ----------------------------<  81  3.5   |  26  |  0.71    Ca    8.2<L>      28 Jun 2020 06:53  Phos  2.5     06-28    TPro  5.3<L>  /  Alb  2.4<L>  /  TBili  0.7  /  DBili  x   /  AST  53<H>  /  ALT  46  /  AlkPhos  107  06-28            RADIOLOGY & ADDITIONAL TESTS:

## 2020-06-29 NOTE — PROGRESS NOTE ADULT - SUBJECTIVE AND OBJECTIVE BOX
[facility] -- Noe Land, Jamel Sung Pannella, Patel, Savella  Office # [phone]      Follow Up:    Gi bleed, CAD  Subjective/Observations:   Interim events noted. Now resting comfortably in bed.  No complaints of chest pain, dyspnea, or palpitations reported. No signs of orthopnea or PND.     REVIEW OF SYSTEMS: All other review of systems is negative unless indicated above    PAST MEDICAL & SURGICAL HISTORY:  Asthma  Melanoma in situ  Hypothyroid  H/O Clostridium difficile infection  Urinary retention  Plaque psoriasis  History of urinary self-catheterization  Arthritis  Prostate CA: seed therapy aug 2009  Hyperlipidemia  Hypertension  CAD (coronary artery disease)  Acute MI  S/P tonsillectomy  H/O hernia repair  S/P cardiac cath      MEDICATIONS  (STANDING):  ferrous    sulfate 325 milliGRAM(s) Oral daily  finasteride 5 milliGRAM(s) Oral daily  fluticasone propionate 50 MICROgram(s)/spray Nasal Spray 2 Spray(s) Both Nostrils <User Schedule>  levothyroxine 25 MICROGram(s) Oral daily  loratadine 10 milliGRAM(s) Oral daily  metoprolol succinate  milliGRAM(s) Oral daily  montelukast 10 milliGRAM(s) Oral at bedtime  nitrofurantoin macrocrystals (MACRODANTIN) 50 milliGRAM(s) Oral <User Schedule>  pantoprazole  Injectable 40 milliGRAM(s) IV Push two times a day  phenazopyridine 100 milliGRAM(s) Oral every 8 hours  predniSONE   Tablet 1 milliGRAM(s) Oral four times a day  simvastatin 40 milliGRAM(s) Oral at bedtime  tamsulosin 0.4 milliGRAM(s) Oral at bedtime    MEDICATIONS  (PRN):      Allergies    cephalexin (Urticaria)  clindamycin (Diarrhea)  erythromycin (Other)  Methotrexate Sodium (Unknown)  penicillins (Hives)  sporalin (oxypsoralin/PUVA) (Other)  sulfa drugs (Rash)    Intolerances        Vital Signs Last 24 Hrs  T(C): 36.5 (29 Jun 2020 09:43), Max: 37 (28 Jun 2020 12:52)  T(F): 97.7 (29 Jun 2020 09:43), Max: 98.6 (28 Jun 2020 12:52)  HR: 67 (29 Jun 2020 09:43) (67 - 74)  BP: 127/52 (29 Jun 2020 09:43) (127/52 - 146/78)  BP(mean): --  RR: 17 (29 Jun 2020 09:43) (17 - 20)  SpO2: 97% (29 Jun 2020 09:43) (96% - 99%)    I&O's Summary    28 Jun 2020 07:01  -  29 Jun 2020 07:00  --------------------------------------------------------  IN: 460 mL / OUT: 1500 mL / NET: -1040 mL          PHYSICAL EXAM:  TELE:   Constitutional: NAD, awake and alert, well-developed  HEENT: Moist Mucous Membranes, Anicteric  Pulmonary: Non-labored, breath sounds are clear bilaterally, No wheezing, crackles or rhonchi  Cardiovascular: Regular, S1 and S2 nl, No murmurs, rubs, gallops or clicks  Gastrointestinal: Bowel Sounds present, soft, nontender.   Lymph: No lymphadenopathy. No peripheral edema.  Skin: No visible rashes or ulcers.  Psych:  Mood & affect appropriate    LABS: All Labs Reviewed:                        9.7    12.91 )-----------( 285      ( 29 Jun 2020 06:24 )             30.5                         9.6    x     )-----------( x        ( 28 Jun 2020 14:16 )             30.8                         9.7    11.26 )-----------( 276      ( 28 Jun 2020 06:53 )             30.9     28 Jun 2020 06:53    143    |  112    |  10     ----------------------------<  81     3.5     |  26     |  0.71   27 Jun 2020 07:29    142    |  110    |  13     ----------------------------<  83     3.6     |  26     |  0.74     Ca    8.2        28 Jun 2020 06:53  Ca    8.4        27 Jun 2020 07:29  Phos  2.5       28 Jun 2020 06:53  Phos  2.0       27 Jun 2020 07:29  Mg     1.6       27 Jun 2020 07:29    TPro  5.3    /  Alb  2.4    /  TBili  0.7    /  DBili  x      /  AST  53     /  ALT  46     /  AlkPhos  107    28 Jun 2020 06:53  TPro  5.0    /  Alb  2.3    /  TBili  0.5    /  DBili  x      /  AST  58     /  ALT  41     /  AlkPhos  96     27 Jun 2020 07:29             ECG:  < from: 12 Lead ECG (06.25.20 @ 12:09) >  Ventricular Rate 69 BPM    Atrial Rate 69 BPM    P-R Interval 134 ms    QRS Duration 90 ms    Q-T Interval 440 ms    QTC Calculation(Bezet) 471 ms    P Axis 61 degrees    R Axis -31 degrees    T Axis 133 degrees    Diagnosis Line Normal sinus rhythm  Left axis deviation  Left ventricular hypertrophy with repolarization abnormality  Inferior infarct (cited on or before 29-MAR-2010)  Nonspecific ST abnormality  Abnormal ECG  Confirmed by francois Kaplan ([id number]) on 6/25/2020 2:14:26 PM    < end of copied text >    Echo:    Radiology:  < from: Xray Chest 1 View- PORTABLE-Routine (06.25.20 @ 12:15) >  EXAM:  XR CHEST PORTABLE ROUTINE 1V                            PROCEDURE DATE:  06/25/2020          INTERPRETATION:  Medical evaluation.    AP chest. Prior 3/11/2013.    Heart magnified by projection. Atherosclerotic aorta. Occasional bilateral calcified granulomas. No acute infiltrate or pleural effusion.    Impression: No active disease                ALESIA RAMACHANDRAN M.D., ATTENDING RADIOLOGIST  This document has been electronically signed. Jun 25 2020 12:26PM                < end of copied text >

## 2020-06-29 NOTE — PHYSICAL THERAPY INITIAL EVALUATION ADULT - PERTINENT HX OF CURRENT PROBLEM, REHAB EVAL
79 YO M with pmhx of CAD, HTN, HLD, prostate cancer (seed radiation 2009), MI (nov 2000, angioplasty w 3 stents to the right coronary artery), rheumatoid and psoriatic arthritis, plaque psoriasis, b/l hip replacements (left 1998, right 2002), C diff in 2014, urinary retention with self catheretization hypothyroidism, mild asthma, melanoma in situ presents for GIB.

## 2020-06-29 NOTE — PROGRESS NOTE ADULT - ASSESSMENT
MARY on CKD 2  GI bleeding  HTN  Anemia    -BLCr 0.9  -MARY likely 2/2 hypoxic/prerenal injury driven by GI bleeding  -UA 30 protein  -Ur lytes reviewed   -Renal indices are stable at baseline range  -Off IVF  -PRBCs per primary  -BP rising again; Plan to restart HCTZ tomorrow  -GI eval noted; S/P endoscopy  -Now with paez, urology following     Thank you

## 2020-06-29 NOTE — PHYSICAL THERAPY INITIAL EVALUATION ADULT - BED MOBILITY TRAINING, PT EVAL
Patient will perform all bed mobility with supervision in 1 week in order to increase mobility at home

## 2020-06-29 NOTE — PROGRESS NOTE ADULT - SUBJECTIVE AND OBJECTIVE BOX
Patient is a 80y old  Male who presents with a chief complaint of GI bleeding (29 Jun 2020 16:05)      INTERVAL HPI:  OVERNIGHT EVENTS:  T(F): 97.8 (06-29-20 @ 13:56), Max: 98.6 (06-29-20 @ 10:50)  HR: 67 (06-29-20 @ 13:56) (67 - 74)  BP: 156/71 (06-29-20 @ 13:56) (127/52 - 156/71)  RR: 15 (06-29-20 @ 13:56) (12 - 18)  SpO2: 92% (06-29-20 @ 13:56) (92% - 99%)  I&O's Summary    28 Jun 2020 07:01  -  29 Jun 2020 07:00  --------------------------------------------------------  IN: 460 mL / OUT: 1500 mL / NET: -1040 mL    29 Jun 2020 07:01  -  29 Jun 2020 18:24  --------------------------------------------------------  IN: 0 mL / OUT: 300 mL / NET: -300 mL        REVIEW OF SYSTEMS:  CONSTITUTIONAL: No fever, weight loss, or fatigue  EYES: No eye pain, visual disturbances, or discharge  ENMT:  No difficulty hearing, tinnitus, vertigo; No sinus or throat pain  NECK: No pain or stiffness  BREASTS: No pain, masses, or nipple discharge  RESPIRATORY: No cough, wheezing, chills or hemoptysis; No shortness of breath  CARDIOVASCULAR: No chest pain, palpitations, dizziness, or leg swelling  GASTROINTESTINAL: No abdominal or epigastric pain. No nausea, vomiting, or hematemesis; No diarrhea or constipation. No melena or hematochezia.  GENITOURINARY: No dysuria, frequency, hematuria, or incontinence  NEUROLOGICAL: No headaches, memory loss, loss of strength, numbness, or tremors  SKIN: No itching, burning, rashes, or lesions   LYMPH NODES: No enlarged glands  ENDOCRINE: No heat or cold intolerance; No hair loss  MUSCULOSKELETAL: No joint pain or swelling; No muscle, back, or extremity pain  PSYCHIATRIC: No depression, anxiety, mood swings, or difficulty sleeping  HEME/LYMPH: No easy bruising, or bleeding gums  ALLERY AND IMMUNOLOGIC: No hives or eczema    PHYSICAL EXAM:  GENERAL: NAD, well-groomed, well-developed  HEAD:  Atraumatic, Normocephalic  EYES: EOMI, PERRLA, conjunctiva and sclera clear  ENMT: No tonsillar erythema, exudates, or enlargement; Moist mucous membranes, Good dentition, No lesions  NECK: Supple, No JVD, Normal thyroid  NERVOUS SYSTEM:  Alert & Oriented X3, Good concentration; Motor Strength 5/5 B/L upper and lower extremities; DTRs 2+ intact and symmetric  CHEST/LUNG: Clear to percussion bilaterally; No rales, rhonchi, wheezing, or rubs  HEART: Regular rate and rhythm; No murmurs, rubs, or gallops  ABDOMEN: Soft, Nontender, Nondistended; Bowel sounds present  EXTREMITIES:  2+ Peripheral Pulses, No clubbing, cyanosis, or edema  LYMPH: No lymphadenopathy noted  SKIN: No rashes or lesions    LABS:                        9.7    12.91 )-----------( 285      ( 29 Jun 2020 06:24 )             30.5     06-29    143  |  112<H>  |  10  ----------------------------<  80  3.5   |  25  |  0.71    Ca    8.4<L>      29 Jun 2020 13:49  Phos  2.5     06-28    TPro  5.3<L>  /  Alb  2.4<L>  /  TBili  0.7  /  DBili  x   /  AST  53<H>  /  ALT  46  /  AlkPhos  107  06-28        CAPILLARY BLOOD GLUCOSE          06-28 @ 21:16   No growth  --  --          MEDICATIONS  (STANDING):  ferrous    sulfate 325 milliGRAM(s) Oral daily  finasteride 5 milliGRAM(s) Oral daily  fluticasone propionate 50 MICROgram(s)/spray Nasal Spray 2 Spray(s) Both Nostrils <User Schedule>  levothyroxine 25 MICROGram(s) Oral daily  loratadine 10 milliGRAM(s) Oral daily  metoprolol succinate  milliGRAM(s) Oral daily  montelukast 10 milliGRAM(s) Oral at bedtime  pantoprazole  Injectable 40 milliGRAM(s) IV Push two times a day  phenazopyridine 100 milliGRAM(s) Oral every 8 hours  predniSONE   Tablet 1 milliGRAM(s) Oral four times a day  simvastatin 40 milliGRAM(s) Oral at bedtime  tamsulosin 0.4 milliGRAM(s) Oral at bedtime    MEDICATIONS  (PRN): Patient is a 80y old  Male who presents with a chief complaint of GI bleeding (29 Jun 2020 16:05)      INTERVAL HPI: Pt seen and examined. Some disturbed by colonic mass findings with colonoscopy, now prepping for CT abd. Denies any acute complaints at this time.     OVERNIGHT EVENTS: geoffreyshine noted  T(F): 97.8 (06-29-20 @ 13:56), Max: 98.6 (06-29-20 @ 10:50)  HR: 67 (06-29-20 @ 13:56) (67 - 74)  BP: 156/71 (06-29-20 @ 13:56) (127/52 - 156/71)  RR: 15 (06-29-20 @ 13:56) (12 - 18)  SpO2: 92% (06-29-20 @ 13:56) (92% - 99%)  I&O's Summary    28 Jun 2020 07:01  -  29 Jun 2020 07:00  --------------------------------------------------------  IN: 460 mL / OUT: 1500 mL / NET: -1040 mL    29 Jun 2020 07:01  -  29 Jun 2020 18:24  --------------------------------------------------------  IN: 0 mL / OUT: 300 mL / NET: -300 mL        REVIEW OF SYSTEMS:  CONSTITUTIONAL: No fever, weight loss, or fatigue  RESPIRATORY: No cough, wheezing, chills or hemoptysis; No shortness of breath  CARDIOVASCULAR: No chest pain, palpitations, dizziness, or leg swelling  GASTROINTESTINAL: No abdominal or epigastric pain. No nausea, vomiting, or hematemesis; + diarrhea No constipation. No melena or hematochezia.  GENITOURINARY: + hematuric clots, urge, discomfort  NEUROLOGICAL: No headaches, memory loss, loss of strength, numbness, or tremors  SKIN: No itching, burning, rashes, or lesions   MUSCULOSKELETAL: No joint pain or swelling; No muscle, back, or extremity pain  PSYCHIATRIC: No depression, anxiety, mood swings, or difficulty sleeping      PHYSICAL EXAM:  GENERAL: NAD, well-groomed, elder  NERVOUS SYSTEM:  Alert & Oriented X3,  Motor Strength 3/5 B/L upper and lower extremities  CHEST/LUNG: Clear to percussion bilaterally; No rales, rhonchi, wheezing, or rubs  HEART: Regular rate and rhythm; No murmurs, rubs, or gallops  ABDOMEN: Soft, Nontender, Nondistended; Bowel sounds present  EXTREMITIES:  2+ Peripheral Pulses, No clubbing, cyanosis, or edema  SKIN: No rashes or lesions    LABS:                        9.7    12.91 )-----------( 285      ( 29 Jun 2020 06:24 )             30.5     06-29    143  |  112<H>  |  10  ----------------------------<  80  3.5   |  25  |  0.71    Ca    8.4<L>      29 Jun 2020 13:49  Phos  2.5     06-28    TPro  5.3<L>  /  Alb  2.4<L>  /  TBili  0.7  /  DBili  x   /  AST  53<H>  /  ALT  46  /  AlkPhos  107  06-28        CAPILLARY BLOOD GLUCOSE          06-28 @ 21:16   No growth  --  --          MEDICATIONS  (STANDING):  ferrous    sulfate 325 milliGRAM(s) Oral daily  finasteride 5 milliGRAM(s) Oral daily  fluticasone propionate 50 MICROgram(s)/spray Nasal Spray 2 Spray(s) Both Nostrils <User Schedule>  levothyroxine 25 MICROGram(s) Oral daily  loratadine 10 milliGRAM(s) Oral daily  metoprolol succinate  milliGRAM(s) Oral daily  montelukast 10 milliGRAM(s) Oral at bedtime  pantoprazole  Injectable 40 milliGRAM(s) IV Push two times a day  phenazopyridine 100 milliGRAM(s) Oral every 8 hours  predniSONE   Tablet 1 milliGRAM(s) Oral four times a day  simvastatin 40 milliGRAM(s) Oral at bedtime  tamsulosin 0.4 milliGRAM(s) Oral at bedtime    MEDICATIONS  (PRN):

## 2020-06-29 NOTE — BRIEF OPERATIVE NOTE - COMMENTS
1.  Follow up pathology  2.  Surgical consult  3.  Medical oncology consult  4.  CT scan of the abd/pelvis  5.  Clear liquid diet only  6.  Transfer to floors  7.  Will follow with you.

## 2020-06-29 NOTE — CONSULT NOTE ADULT - SUBJECTIVE AND OBJECTIVE BOX
HPI:  80 year old male with PMH of CAD, s/p MI (Nov 2000, angioplasty w 3 stents to the right coronary artery), HTN, HLD, prostate cancer (seed radiation 2009), rheumatoid and psoriatic arthritis, plaque psoriasis, b/l hip replacements (left 1998, right 2002), C. diff in 2014, urinary retention with self catheretization 5-6 times daily, hypothyroidism, mild asthma, hx of melanoma in situ, admitted with GIB s/p 2u PRBC.  Patient had been having intermittent bloody stools since the fall of 2019, which he attributed to hemorrhoids, was supposed to undergo colonoscopy in March 2020 with GI Dr. Mullen, but delayed due to COVID-19.  Patient admits to passing flatus and had a loose BM today; he is unsure if the BM was bloody.  Patient underwent colonoscopy with GI today with finding of a large, near-obstructing sigmoid mass.  Full body CT performed this admission with findings suspicious for diffuse hepatic and osseous metastatic disease, as well as possible peritoneal carcinomatosis.    PAST MEDICAL & SURGICAL HISTORY:  Asthma  Melanoma in situ  Hypothyroid  H/O Clostridium difficile infection  Urinary retention  Plaque psoriasis  History of urinary self-catheterization  Arthritis  Prostate CA: seed therapy aug 2009  Hyperlipidemia  Hypertension  CAD (coronary artery disease)  Acute MI  S/P tonsillectomy  H/O hernia repair  S/P cardiac cath    REVIEW OF SYSTEMS:  CONSTITUTIONAL: +weakness, +weight loss. No fevers or chills  EYES/ENT: No visual changes;  No vertigo or throat pain   NECK: No pain or stiffness  RESPIRATORY: No cough, wheezing, hemoptysis; No shortness of breath  CARDIOVASCULAR: No chest pain or palpitations  GASTROINTESTINAL: See HPI. No abdominal or epigastric pain. No nausea, vomiting, or hematemesis; No diarrhea or constipation.  GENITOURINARY: No dysuria, frequency or hematuria  NEUROLOGICAL: No numbness or weakness  SKIN: No itching, burning, rashes, or lesions   All other review of systems is negative unless indicated above.    MEDICATIONS  (STANDING):  ferrous    sulfate 325 milliGRAM(s) Oral daily  finasteride 5 milliGRAM(s) Oral daily  fluticasone propionate 50 MICROgram(s)/spray Nasal Spray 2 Spray(s) Both Nostrils <User Schedule>  levothyroxine 25 MICROGram(s) Oral daily  loratadine 10 milliGRAM(s) Oral daily  metoprolol succinate  milliGRAM(s) Oral daily  montelukast 10 milliGRAM(s) Oral at bedtime  pantoprazole  Injectable 40 milliGRAM(s) IV Push two times a day  phenazopyridine 100 milliGRAM(s) Oral every 8 hours  predniSONE   Tablet 1 milliGRAM(s) Oral four times a day  simvastatin 40 milliGRAM(s) Oral at bedtime  tamsulosin 0.4 milliGRAM(s) Oral at bedtime    Allergies  cephalexin (Urticaria)  clindamycin (Diarrhea)  erythromycin (Other)  Methotrexate Sodium (Unknown)  penicillins (Hives)  sporalin (oxypsoralin/PUVA) (Other)  sulfa drugs (Rash)    SOCIAL HISTORY:  Never a smoker.  No ETOH or illicit drug use.    FAMILY HISTORY:  Colon cancer in maternal aunt  Lung cancer in father  FH: Alzheimers disease    Vital Signs Last 24 Hrs  T(C): 37.3 (29 Jun 2020 21:09), Max: 37.3 (29 Jun 2020 21:09)  T(F): 99.2 (29 Jun 2020 21:09), Max: 99.2 (29 Jun 2020 21:09)  HR: 77 (29 Jun 2020 21:09) (67 - 77)  BP: 131/73 (29 Jun 2020 21:09) (127/52 - 156/71)  RR: 16 (29 Jun 2020 21:09) (12 - 17)  SpO2: 96% (29 Jun 2020 21:09) (92% - 99%)    PHYSICAL EXAM  GENERAL:  Elderly male lying comfortably in bed in NAD  HEAD:  Normocephalic, atraumatic  CARDIO:  Regular rate and rhythm.  No murmur, gallop or rub appreciated.  RESPIRATORY:  Clear to auscultation bilaterally.  No wheezing, rales or rhonchi appreciated.  ABDOMEN:  Soft, nondistended, nontender;  No rebound tenderness or guarding.    LABS:                        9.7    12.91 )-----------( 285      ( 29 Jun 2020 06:24 )             30.5     06-29    143  |  112<H>  |  10  ----------------------------<  80  3.5   |  25  |  0.71    Ca    8.4<L>      29 Jun 2020 13:49  Phos  2.5     06-28    TPro  5.3<L>  /  Alb  2.4<L>  /  TBili  0.7  /  DBili  x   /  AST  53<H>  /  ALT  46  /  AlkPhos  107  06-28    RADIOLOGY & ADDITIONAL STUDIES:    6/29/20 EGD/colonoscopy:  EGD: Small hiatal hernia, multiple gastric polyps - biopsies obtained.  Normal duodenum s/p bx to r/o villous atrophy.   Colonoscopy:  Large, near obstructing, fungating mass in sigmoid colon at 30 cm from dentate line - colonoscope could not traverse through mass - biopsies obtained of the mass.  Area just distal to the mass was tattooed with CORNEL INK.  At 15 cm from the dentate line, there was a small benign appearing polyp in sigmoid colon that could not be removed secondary to difficult position - biopsy was obtained.  Sigmoid diverticulosis.    < from: CT Chest w/ Oral Cont and w/ IV Cont (06.29.20 @ 16:59) >  CHEST:  LUNGS AND LARGE AIRWAYS: PLEURA:   There are small bilateral pleural effusions with underlying compressive atelectasis.  The central airways remain patent.  VESSELS: Atherosclerotic calcification of thoracic aorta and coronary arteries.  HEART: Heart size is normal. No pericardial effusion.  MEDIASTINUM AND SOURAV: No lymphadenopathy.  CHEST WALL AND LOWER NECK: Within normal limits.    ABDOMEN AND PELVIS:  LIVER: There is a large conglomerate heterogeneous mass replacing the majority of the right hepatic lobe liver parenchyma. There is nodular contour of the liver and several low density lesions throughout the left hepatic lobe. Findings are suggestive of diffuse metastatic disease.  BILE DUCTS: Normal caliber.  GALLBLADDER: 6 mm polypoid density projecting within the lumen of the gallbladder could reflect polyp or gallstone. No gallbladder wall thickening.  SPLEEN: Within normal limits.  Probable accessory splenules.  PANCREAS: Within normal limits.  ADRENALS: Within normal limits.  KIDNEYS/URETERS: Large cyst upper pole left kidney. Smaller indeterminate hypodense bilateral renal lesions.  Bilateral extrarenal pelvises.  Evaluation of the pelvis is limited due to extensive metallic streak artifact related to patient's bilateral hip arthroplasties.  BLADDER: Limited evaluation.  Possible left-sided bladder diverticulum.  REPRODUCTIVE ORGANS: Limited evaluation. Radiation seed implants are present.  BOWEL: Evaluation of the rectosigmoid colon limited due to metallic streak artifact.  Sigmoid diverticulosis with segmental bowel wall thickening.  The descending colon is underdistended.  No bowel obstruction noted.  PERITONEUM: There is small to moderate free fluid within the pelvis.  There is mild nodularity along the peritoneal surface, example, image 77, series 2.  VESSELS: Atherosclerotic calcification.  RETROPERITONEUM/LYMPH NODES: No enlarged retroperitoneal lymphadenopathy.    ABDOMINAL WALL: Within normal limits.  BONES: Mottled lytic and sclerotic appearance bilateral posterior iliac bones with scattered sclerotic foci within the pelvis; and T6 and T7 vertebral bodies; findings suspicious for osseous metastatic disease.    IMPRESSION:   Small bilateral pleural effusions and underlying compressive atelectasis.  CT findings suggestive of diffuse hepatic metastatic disease.  Small to moderate pelvic ascites.  Mild peritoneal surface nodularity.  Findings may reflect peritoneal carcinomatosis.  Findings suspicious for osseous metastatic disease as discussed.

## 2020-06-29 NOTE — PROGRESS NOTE ADULT - ASSESSMENT
79y/o M PMH of CAD, HTN, HLD, prostate cancer (seed radiation 2009), MI (nov 2000, angioplasty w 3 stents to the right coronary artery), rheumatoid and psoriatic arthritis, plaque psoriasis, b/l hip replacements (left 1998, right 2002), C diff in 2014, urinary retention with self catheretization hypothyroidism, mild asthma, melanoma in situ presents for GIB. Pt states he noticed right red blood in his stools for 6 months. Pt has been feeling weakness, weight loss of 19 pounds since June 2019. Pt sent in by outpatient hematologist Dr. Bergeron given low Hgb outpatient. (6.8 on 6/24) Pt denies SOB or GEORGES, however feels weak when pushing heavy objects. Pt was scheduled with his GI doctor Dr. Mullen for outpatient colonoscopy in March 2020. However, colonoscopy canceled due to COVID19. Pt self catheterizes 5-6 times a day.    CARDIAC OPTIMIZATION/CLEARANCE  - There is no evidence of acute ischemia.  - EKG is unchanged when compared to prior  - Pt w/o anginal complaints  - There is no evidence of significant arrhythmia.  - There is no evidence for meaningful  volume overload.  - TTE 12/1/18 mild AS, LVD,45 %, no need to repeat  - Patient is optimized for colonoscopy procedure    CAD S/P stent  - Stable  - Continue Toprol  mg PO daily  - Continue Statin     HTN  - BP: 127/52 (06-29-20 @ 09:43) (127/52 - 146/78)  - Continue BB at home dose  - Hold HCTZ in setting of GI Bleed, resume prior to discharge  - Monitor routine hemodynamics.     HLD  - Continue statin    Anemia  -stable  - S/P PRBCs  - Trend CBC  - Would transfuse to keep Hb>8 and watch volume status     MARY  - Improved. no labs draw today  - Holding HCTZ  for now, resume prior to discharge  - Monitor and replete lytes, keep K>4, Mg>2.  - All other medical needs as per primary team.  - Other cardiovascular workup will depend on clinical course.  - Will continue to follow.    Rubin Justice DNP, ANP-c  Cardiology   Spectra #[id number]  [phone]

## 2020-06-29 NOTE — CONSULT NOTE ADULT - ASSESSMENT
80 year old male with PMH of CAD, s/p MI (Nov 2000, angioplasty w 3 stents to the right coronary artery), HTN, HLD, prostate cancer (seed radiation 2009), rheumatoid and psoriatic arthritis, plaque psoriasis, b/l hip replacements (left 1998, right 2002), C. diff in 2014, urinary retention with self catheretization 5-6 times daily, hypothyroidism, mild asthma, hx of melanoma in situ, admitted with GIB s/p 2u PRBC, found to have large near-obstructing sigmoid mass, likely with metastases to liver, carcinomatosis.    - Patient may benefit from palliative diverting colostomy vs resection  - To be discussed further with heme/onc and primary team  - Discussed with Dr. Ross 80 year old male with PMH of CAD, s/p MI (Nov 2000, angioplasty w 3 stents to the right coronary artery), HTN, HLD, prostate cancer (seed radiation 2009), rheumatoid and psoriatic arthritis, plaque psoriasis, b/l hip replacements (left 1998, right 2002), C. diff in 2014, urinary retention with self catheretization 5-6 times daily, hypothyroidism, mild asthma, hx of melanoma in situ, admitted with GIB s/p 2u PRBC, found to have large near-obstructing sigmoid mass, likely with metastases to liver, carcinomatosis.    - Patient may benefit from palliative diverting colostomy vs resection  - To be discussed further with heme/onc and primary team  - Discussed with Dr. Ross    All as above.  Patient personally seen and examined at time of this consult.  CEA markedly elevated.  Colonoscopy with obstructing (nearly) sigmoid lesion.  CT with diffuse disease, near liver replacement and possible carcinomatosis.  Entire picture most consistent with metastatic/ advanced colon cancer.  Diversion +/- palliative resection is most appropriate surgical option to prevent suffering of progression to total obstruction or perforation.  Allowing liquids for now until decisions are made...

## 2020-06-29 NOTE — PHYSICAL THERAPY INITIAL EVALUATION ADULT - ADDITIONAL COMMENTS
Patient lives in private home, + PRAKASH with one railing.  Home is [place], 1 flight to main level with one railing then on main surface.  Patient has stall shower with seat, + grab bars.  Patient was independent in all ADLs and ambulated independently without device, but used SAC as needed.

## 2020-06-29 NOTE — PROGRESS NOTE ADULT - SUBJECTIVE AND OBJECTIVE BOX
Patient is a 80y old  Male who presents with a chief complaint of  GI bleeding    HPI: Hypotonic Bladder, HTN, HLD, Prostate Cancer presents with GI bleeding and fatigue/weakness.  Patient has been having ongoing issues with GI bleeding and was scheduled to have oupt endoscopy, however was delayed due to covid 19.  He presents with Fatigue.  He chronically straight caths himself due to urinary retention for 12 years and has followed with urology.  He states he has been straight cathing with normal volume, but has been having more frequency.  He denies dysuria/SOB/CP/Dizziness/N/V.  Saw Dr. Carrasco-nephrologist sometime ago.      Had 2 episode of diarrhea.  Straight cath adequate amount.     Follow up Acute on CKD Stage 2  No new complaints    PAST MEDICAL & SURGICAL HISTORY:  Prostate CA  Hyperlipidemia  Hypertension  CAD (coronary artery disease)  Acute MI  No significant past surgical history       FAMILY HISTORY:  [place]    Social History:Non smoker    MEDICATIONS  (STANDING):  pantoprazole  Injectable 40 milliGRAM(s) IV Push Once  pantoprazole  Injectable 40 milliGRAM(s) IV Push two times a day    MEDICATIONS  (PRN):   Meds reviewed    Allergies    clindamycin (Diarrhea)  penicillins (Hives)  sulfa drugs (Rash)    Intolerances         REVIEW OF SYSTEMS:    CONSTITUTIONAL:  Fatigue   EYES: No eye pain, visual disturbances, or discharge  ENMT:  No difficulty hearing, tinnitus, vertigo; No sinus or throat pain  NECK: No pain or stiffness  BREASTS: No pain, masses, or nipple discharge  RESPIRATORY: No SOB. No wheeze. No GEORGES  CARDIOVASCULAR: No chest pain, palpitations, dizziness,   GASTROINTESTINAL: No abdominal or epigastric pain. No nausea, vomiting, or hematemesis; No diarrhea or constipation. + GI bleeding  GENITOURINARY: No dysuria, frequency, hematuria, or incontinence  NEUROLOGICAL: No headaches, memory loss, loss of strength, numbness, or tremors  SKIN: no Diffuse erythema, no blisters  LYMPH NODES: No enlarged glands  ENDOCRINE: No heat or cold intolerance; No hair loss  MUSCULOSKELETAL: No joint pain or swelling   PSYCHIATRIC: No depression, anxiety, mood swings, or difficulty sleeping  HEME/LYMPH: No easy bruising, or bleeding gums  ALLERGY AND IMMUNOLOGIC: No hives or eczema      ICU Vital Signs Last 24 Hrs  T(C): 36.6 (29 Jun 2020 13:56), Max: 37 (29 Jun 2020 10:50)  T(F): 97.8 (29 Jun 2020 13:56), Max: 98.6 (29 Jun 2020 10:50)  HR: 67 (29 Jun 2020 13:56) (67 - 74)  BP: 156/71 (29 Jun 2020 13:56) (127/52 - 156/71)  BP(mean): --  ABP: --  ABP(mean): --  RR: 15 (29 Jun 2020 13:56) (12 - 18)  SpO2: 92% (29 Jun 2020 13:56) (92% - 99%)        PHYSICAL EXAM:    GENERAL: No acute distress  HEAD:  Atraumatic, Normocephalic  EYES: Conjunctiva and sclera clear  ENMT: No tonsillar erythema, exudates, or enlargement; Moist mucous membranes, Good dentition, No lesions  NECK: Supple, neck  veins full  NERVOUS SYSTEM:  Alert & Oriented X3, Good concentration; Motor Strength wnl upper and lower extremities  CHEST/LUNG: Clear to percussion bilaterally; No rales, rhonchi, wheezing, or rubs  HEART: Regular rate and rhythm; No murmurs, rubs, or gallops  ABDOMEN: Soft, Nontender, Nondistended; Bowel sounds present,  EXTREMITIES:  +1 Edema  SKIN: No rashes or lesions, pale      LABS:                                   9.7    12.91 )-----------( 285      ( 29 Jun 2020 06:24 )             30.5     06-29    143  |  112<H>  |  10  ----------------------------<  80  3.5   |  25  |  0.71    Ca    8.4<L>      29 Jun 2020 13:49  Phos  2.5     06-28    TPro  5.3<L>  /  Alb  2.4<L>  /  TBili  0.7  /  DBili  x   /  AST  53<H>  /  ALT  46  /  AlkPhos  107  06-28            Sq Epi: x / Non Sq Epi: x / Bacteria: x      Magnesium, Serum: 1.6 mg/dL (06-27 @ 07:29)  Phosphorus Level, Serum: 2.0 mg/dL (06-27 @ 07:29)

## 2020-06-29 NOTE — PROGRESS NOTE ADULT - SUBJECTIVE AND OBJECTIVE BOX
Patient seen and examined;  Chart reviewed and events noted;   feels overall well; awaiting EGD/COLO      MEDICATIONS  (STANDING):  ferrous    sulfate 325 milliGRAM(s) Oral daily  finasteride 5 milliGRAM(s) Oral daily  fluticasone propionate 50 MICROgram(s)/spray Nasal Spray 2 Spray(s) Both Nostrils <User Schedule>  levothyroxine 25 MICROGram(s) Oral daily  loratadine 10 milliGRAM(s) Oral daily  metoprolol succinate  milliGRAM(s) Oral daily  montelukast 10 milliGRAM(s) Oral at bedtime  nitrofurantoin macrocrystals (MACRODANTIN) 50 milliGRAM(s) Oral <User Schedule>  pantoprazole  Injectable 40 milliGRAM(s) IV Push two times a day  phenazopyridine 100 milliGRAM(s) Oral every 8 hours  predniSONE   Tablet 1 milliGRAM(s) Oral four times a day  simvastatin 40 milliGRAM(s) Oral at bedtime  tamsulosin 0.4 milliGRAM(s) Oral at bedtime      Vital Signs Last 24 Hrs  T(C): 36.8 (29 Jun 2020 04:55), Max: 37 (28 Jun 2020 12:52)  T(F): 98.3 (29 Jun 2020 04:55), Max: 98.6 (28 Jun 2020 12:52)  HR: 69 (29 Jun 2020 04:55) (69 - 74)  BP: 132/65 (29 Jun 2020 04:55) (132/65 - 146/78)  RR: 17 (29 Jun 2020 04:55) (17 - 20)  SpO2: 99% (29 Jun 2020 04:55) (96% - 99%)    PHYSICAL EXAM  General: adult in NAD  HEENT: clear oropharynx, anicteric sclera, pink conjunctivae  Neck: supple  CV: normal S1S2 with no murmur rubs or gallops  Lungs: clear to auscultation, no wheezes, no rhales  Abdomen: soft non-tender non-distended, no hepato/splenomegaly  Ext: no clubbing cyanosis or edema  Skin: no rashes and no petichiae  Neuro: alert and oriented X3 no focal deficits      LABS:                        9.7    12.91 )-----------( 285      ( 29 Jun 2020 06:24 )             30.5     Hemoglobin: 9.7 g/dL (06-29 @ 06:24)  Hemoglobin: 9.6 g/dL (06-28 @ 14:16)  Hemoglobin: 9.7 g/dL (06-28 @ 06:53)  Hemoglobin: 9.1 g/dL (06-27 @ 07:29)  Hemoglobin: 9.0 g/dL (06-26 @ 16:54)    06-28    143  |  112<H>  |  10  ----------------------------<  81  3.5   |  26  |  0.71    Ca    8.2<L>      28 Jun 2020 06:53  Phos  2.5     06-28    TPro  5.3<L>  /  Alb  2.4<L>  /  TBili  0.7  /  DBili  x   /  AST  53<H>  /  ALT  46  /  AlkPhos  107  06-28

## 2020-06-30 NOTE — CONSULT NOTE ADULT - SUBJECTIVE AND OBJECTIVE BOX
Date/Time Patient Seen:  		  Referring MD:   Data Reviewed	       Patient is a 80y old  Male who presents with a chief complaint of GI bleeding (29 Jun 2020 16:05)      Subjective/HPI  in bed  seen and examined  vs and meds reviewed  labs reviewed  H and P reviewed  ER provider note reviewed  retired Dentist     follows with Dr. Narayan - Pulmonary Medicine      80 year old male with PMH of CAD, s/p MI (Nov 2000, angioplasty w 3 stents to the right coronary artery), HTN, HLD, prostate cancer (seed radiation 2009), rheumatoid and psoriatic arthritis, plaque psoriasis, b/l hip replacements (left 1998, right 2002), C. diff in 2014, urinary retention with self catheretization 5-6 times daily, hypothyroidism, mild asthma, hx of melanoma in situ, admitted with GIB s/p 2u PRBC.  Patient had been having intermittent bloody stools since the fall of 2019, which he attributed to hemorrhoids, was supposed to undergo colonoscopy in March 2020 with GI Dr. Mullen, but delayed due to COVID-19.  Patient admits to passing flatus and had a loose BM today; he is unsure if the BM was bloody.  Patient underwent colonoscopy with GI today with finding of a large, near-obstructing sigmoid mass.  Full body CT performed this admission with findings suspicious for diffuse hepatic and osseous metastatic disease, as well as possible peritoneal carcinomatosis.     History and Physical:   Source of Information	Chart(s), Patient  Comments/Contacts	wife   Outpatient Providers	Dr. Shyann Gilbert PCP  rheumatologist Dr. Chato Craig  GI Dr. Rajesh Carballo  cardiology Dr. Sung  urologist Dr. Duffy  Pulmonologist Dr. Asim Narayan  Radiation oncologist Dr. Richard Ferreira   Podiatrist Dr. del Velasco   oncologist Majo Mayers Dr.   derm Dr. Torres Cueto  [phone]     Patient Identity:  · Birth Sex	Male       History of Present Illness:  History of Present Illness:   81 YO M with pmhx of CAD, HTN, HLD, prostate cancer (seed radiation 2009), MI (nov 2000, angioplasty w 3 stents to the right coronary artery), rheumatoid and psoriatic arthritis, plaque psoriasis, b/l hip replacements (left 1998, right 2002), C diff in 2014, urinary retention with self catheretization hypothyroidism, mild asthma, melanoma in situ presents for GIB. Pt states he noticed right red blood in his stools for 6 months. Pt has been feeling weakness, weight loss of 19 pounds since June 2019. Pt sent in by outpatient hematologist Dr. Bergeron given low Hgb outpatient. (6.8 on 6/24) Pt denies SOB or GEORGES, however feels weak when pushing heavy objects. Pt was scheduled with his GI doctor Dr. Mullen for outpatient colonoscopy in March 2020. However, colonoscopy canceled due to COVID19. Pt self catheterizes 5-6 times a day. Pt admits to burning prior to catheterization when his bladder is full. Pt denies fever, chills, hematemesis, hemoptysis, hematuria, melena, SOB, GEORGES, CP, abdominal pain.     ED vitals significant for temp 98 F, HR 79, /55, RR 16, 93% on room air. Pt's labs significant for +FOBT , wbc 13, Hgb 7.5, hct 25.2, 94% neutrophils, BUN 30, Cr 1.5, AST 95. Pt type and screen performed. CXR showed no acute abnormalities. Pt put on remote tele, made NPO, s/p pantoprazole 40 IV, famotidine 20 IV. 1 unit of pRBCs pending. EKG showed rate 69, NSR, LVH, LAD.       PAST SURGICAL HISTORY:  H/O hernia repair     S/P cardiac cath     S/P tonsillectomy.     FAMILY HISTORY:  FH: Alzheimers disease  FH: asthma.     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	No    Risk Assessment:    Present on Admission:  Deep Venous Thrombosis	no  Pulmonary Embolus	no     Heart Failure:  Does this patient have a history of or has been diagnosed with heart failure? no.  PAST MEDICAL & SURGICAL HISTORY:  Asthma  Melanoma in situ  Hypothyroid  H/O Clostridium difficile infection  Urinary retention  Plaque psoriasis  History of urinary self-catheterization  Arthritis  Prostate CA: seed therapy aug 2009  Hyperlipidemia  Hypertension  CAD (coronary artery disease)  Acute MI  S/P tonsillectomy  H/O hernia repair  S/P cardiac cath  No significant past surgical history        Medication list         MEDICATIONS  (STANDING):  ferrous    sulfate 325 milliGRAM(s) Oral daily  finasteride 5 milliGRAM(s) Oral daily  fluticasone propionate 50 MICROgram(s)/spray Nasal Spray 2 Spray(s) Both Nostrils <User Schedule>  hydrochlorothiazide 12.5 milliGRAM(s) Oral daily  levothyroxine 25 MICROGram(s) Oral daily  loratadine 10 milliGRAM(s) Oral daily  metoprolol succinate  milliGRAM(s) Oral daily  montelukast 10 milliGRAM(s) Oral at bedtime  pantoprazole  Injectable 40 milliGRAM(s) IV Push two times a day  phenazopyridine 100 milliGRAM(s) Oral every 8 hours  predniSONE   Tablet 1 milliGRAM(s) Oral four times a day  simvastatin 40 milliGRAM(s) Oral at bedtime  tamsulosin 0.4 milliGRAM(s) Oral at bedtime    MEDICATIONS  (PRN):         Vitals log        ICU Vital Signs Last 24 Hrs  T(C): 36.7 (30 Jun 2020 05:04), Max: 37.3 (29 Jun 2020 21:09)  T(F): 98.1 (30 Jun 2020 05:04), Max: 99.2 (29 Jun 2020 21:09)  HR: 70 (30 Jun 2020 05:04) (67 - 77)  BP: 151/72 (30 Jun 2020 05:04) (127/52 - 156/71)  BP(mean): --  ABP: --  ABP(mean): --  RR: 16 (30 Jun 2020 05:04) (12 - 17)  SpO2: 98% (30 Jun 2020 05:04) (92% - 98%)           Input and Output:  I&O's Detail    28 Jun 2020 07:01  -  29 Jun 2020 07:00  --------------------------------------------------------  IN:    Oral Fluid: 460 mL  Total IN: 460 mL    OUT:    Ureteral Catheter: 1500 mL  Total OUT: 1500 mL    Total NET: -1040 mL      29 Jun 2020 07:01  -  30 Jun 2020 06:21  --------------------------------------------------------  IN:  Total IN: 0 mL    OUT:    Ureteral Catheter: 2250 mL  Total OUT: 2250 mL    Total NET: -2250 mL          Lab Data                        9.7    12.91 )-----------( 285      ( 29 Jun 2020 06:24 )             30.5     06-29    143  |  112<H>  |  10  ----------------------------<  80  3.5   |  25  |  0.71    Ca    8.4<L>      29 Jun 2020 13:49  Phos  2.5     06-28    TPro  5.3<L>  /  Alb  2.4<L>  /  TBili  0.7  /  DBili  x   /  AST  53<H>  /  ALT  46  /  AlkPhos  107  06-28            Review of Systems	  awake  alert      Objective     Physical Examination    heart s1s2  lung dec BS  abd dec BS  alert  verbal  on RA      Pertinent Lab findings & Imaging      Ana:  NO   Adequate UO     I&O's Detail    28 Jun 2020 07:01  -  29 Jun 2020 07:00  --------------------------------------------------------  IN:    Oral Fluid: 460 mL  Total IN: 460 mL    OUT:    Ureteral Catheter: 1500 mL  Total OUT: 1500 mL    Total NET: -1040 mL      29 Jun 2020 07:01  -  30 Jun 2020 06:21  --------------------------------------------------------  IN:  Total IN: 0 mL    OUT:    Ureteral Catheter: 2250 mL  Total OUT: 2250 mL    Total NET: -2250 mL               Discussed with:     Cultures:	        Radiology  ct scan reviewed

## 2020-06-30 NOTE — PROGRESS NOTE ADULT - PROBLEM SELECTOR PLAN 2
pt is considered a intermediate risk candidate for an intermediate risk surgical  procedure, pt is medically optimized at this time as long as clinically stable and hd stable, apprec cardio optimization

## 2020-06-30 NOTE — PROGRESS NOTE ADULT - SUBJECTIVE AND OBJECTIVE BOX
Hospital day: 1    80y Male admitted with Gastrointestinal hemorrhage  CT Chest/abd/pelvis: likely hepatic mets, carcinamatosis, possible bone mets   .    Patient seen and examined bedside resting comfortably.  Pt complaining of minimal discomfort in lower abdomen.  Seems a bit confused, possible due to difficulty hearing staff. Unsure who he has seen and hasnt.      T(F): 98.1 (06-30-20 @ 05:04), Max: 99.2 (06-29-20 @ 21:09)  HR: 70 (06-30-20 @ 05:04) (67 - 77)  BP: 151/72 (06-30-20 @ 05:04) (127/52 - 156/71)  RR: 16 (06-30-20 @ 05:04) (12 - 17)  SpO2: 98% (06-30-20 @ 05:04) (92% - 98%)  Wt(kg): --  CAPILLARY BLOOD GLUCOSE          PHYSICAL EXAM:  General: NAD  CV: +S1+S2 regular rate and rhythm  Lung: clear to ausculation bilaterally  Abdomen: Soft, ND, mild tenderness in lower abdomen to palpation, percussion  Extremities: no pedal edema or calf tenderness noted. Negative homans sign, good strength b.l  : Paez in place- dark urine in bag.      LABS:                        10.2   14.29 )-----------( 299      ( 30 Jun 2020 07:41 )             32.6     06-30    142  |  110<H>  |  8   ----------------------------<  82  3.3<L>   |  27  |  0.78    Ca    8.6      30 Jun 2020 07:41    TPro  5.3<L>  /  Alb  2.3<L>  /  TBili  0.5  /  DBili  x   /  AST  64<H>  /  ALT  53  /  AlkPhos  147<H>  06-30      I&O's Detail    29 Jun 2020 07:01  -  30 Jun 2020 07:00  --------------------------------------------------------  IN:  Total IN: 0 mL    OUT:    Ureteral Catheter: 2250 mL  Total OUT: 2250 mL    Total NET: -2250 mL          Impression: 80y Male admitted with Gastrointestinal hemorrhage  CT Chest/abd/pelvis: likely hepatic mets, carcinamatosis, possible bone mets     Plan:  -continue VTE prophylaxis-SCD  -Increase activity with PT, OOB, Ambulate  -Patient instructed on and encouraged incentive spirometry use  -continue paez  -Continue medical management.  -Hypokalemia- repleted with potassium  -Continue to monitor labs.  -Possible OR thursday for palliative resection- pending conversation with family.  -Discussed with Dr. Ross.

## 2020-06-30 NOTE — CONSULT NOTE ADULT - PROBLEM SELECTOR RECOMMENDATION 9
sigmoid mass - GI eval noted  Surgery eval reviewed - poss Palliative Diverting Colostomy -   pt has hx of Pulm nodules - and reactive airway disease - Asthma - follows with Dr. Narayan - on Singulair -   on RA - vs noted  alert and oriented and verbal  discussed GOC and Code Status  pt is uncertain about Code Status at the moment - wishes to known about his current dx and prognosis  Heme Onc eval noted  may benefit from Multidisciplinary meeting - eg. Medicine and Surgery   will be available  supportive medical regimen and care  will follow

## 2020-06-30 NOTE — PROGRESS NOTE ADULT - SUBJECTIVE AND OBJECTIVE BOX
Patient is a 80y old  Male who presents with a chief complaint of  GI bleeding    HPI: Hypotonic Bladder, HTN, HLD, Prostate Cancer presents with GI bleeding and fatigue/weakness.  Patient has been having ongoing issues with GI bleeding and was scheduled to have oupt endoscopy, however was delayed due to covid 19.  He presents with Fatigue.  He chronically straight caths himself due to urinary retention for 12 years and has followed with urology.  He states he has been straight cathing with normal volume, but has been having more frequency.  He denies dysuria/SOB/CP/Dizziness/N/V.  Saw Dr. Carrasco-nephrologist sometime ago.      Had 2 episode of diarrhea.  Straight cath adequate amount.     Follow up Acute on CKD Stage 2  No new complaints    PAST MEDICAL & SURGICAL HISTORY:  Prostate CA  Hyperlipidemia  Hypertension  CAD (coronary artery disease)  Acute MI  No significant past surgical history       FAMILY HISTORY:  NC    Social History:Non smoker    MEDICATIONS  (STANDING):  pantoprazole  Injectable 40 milliGRAM(s) IV Push Once  pantoprazole  Injectable 40 milliGRAM(s) IV Push two times a day    MEDICATIONS  (PRN):   Meds reviewed    Allergies    clindamycin (Diarrhea)  penicillins (Hives)  sulfa drugs (Rash)    Intolerances         REVIEW OF SYSTEMS:    CONSTITUTIONAL:  Fatigue   EYES: No eye pain, visual disturbances, or discharge  ENMT:  No difficulty hearing, tinnitus, vertigo; No sinus or throat pain  NECK: No pain or stiffness  BREASTS: No pain, masses, or nipple discharge  RESPIRATORY: No SOB. No wheeze. No GEORGES  CARDIOVASCULAR: No chest pain, palpitations, dizziness,   GASTROINTESTINAL: No abdominal or epigastric pain. No nausea, vomiting, or hematemesis; No diarrhea or constipation. + GI bleeding  GENITOURINARY: No dysuria, frequency, hematuria, or incontinence  NEUROLOGICAL: No headaches, memory loss, loss of strength, numbness, or tremors  SKIN: no Diffuse erythema, no blisters  LYMPH NODES: No enlarged glands  ENDOCRINE: No heat or cold intolerance; No hair loss  MUSCULOSKELETAL: No joint pain or swelling   PSYCHIATRIC: No depression, anxiety, mood swings, or difficulty sleeping  HEME/LYMPH: No easy bruising, or bleeding gums  ALLERGY AND IMMUNOLOGIC: No hives or eczema      ICU Vital Signs Last 24 Hrs  T(C): 36.6 (26 Jun 2020 13:30), Max: 37.8 (26 Jun 2020 02:27)  T(F): 97.9 (26 Jun 2020 13:30), Max: 100.1 (26 Jun 2020 02:27)  HR: 67 (26 Jun 2020 13:30) (61 - 82)  BP: 127/68 (26 Jun 2020 13:30) (119/66 - 151/63)  BP(mean): --  ABP: --  ABP(mean): --  RR: 17 (26 Jun 2020 13:30) (16 - 20)  SpO2: 98% (26 Jun 2020 13:30) (94% - 100%)      PHYSICAL EXAM:    GENERAL: No acute distress  HEAD:  Atraumatic, Normocephalic  EYES: Conjunctiva and sclera clear  ENMT: No tonsillar erythema, exudates, or enlargement; Moist mucous membranes, Good dentition, No lesions  NECK: Supple, neck  veins full  NERVOUS SYSTEM:  Alert & Oriented X3, Good concentration; Motor Strength wnl upper and lower extremities  CHEST/LUNG: Clear to percussion bilaterally; No rales, rhonchi, wheezing, or rubs  HEART: Regular rate and rhythm; No murmurs, rubs, or gallops  ABDOMEN: Soft, Nontender, Nondistended; Bowel sounds present,  EXTREMITIES:  +1 Edema  SKIN: No rashes or lesions, pale      LABS:                              10.2   14.29 )-----------( 299      ( 30 Jun 2020 07:41 )             32.6     06-29    143  |  112<H>  |  10  ----------------------------<  80  3.5   |  25  |  0.71    Ca    8.4<L>      29 Jun 2020 13:49 Patient is a 80y old  Male who presents with a chief complaint of  GI bleeding    HPI: Hypotonic Bladder, HTN, HLD, Prostate Cancer presents with GI bleeding and fatigue/weakness.  Patient has been having ongoing issues with GI bleeding and was scheduled to have oupt endoscopy, however was delayed due to covid 19.  He presents with Fatigue.  He chronically straight caths himself due to urinary retention for 12 years and has followed with urology.  He states he has been straight cathing with normal volume, but has been having more frequency.  He denies dysuria/SOB/CP/Dizziness/N/V.  Saw Dr. Carrasco-nephrologist sometime ago.      Had 2 episode of diarrhea.  Straight cath adequate amount.     Follow up Acute on CKD Stage 2  No new complaints    PAST MEDICAL & SURGICAL HISTORY:  Prostate CA  Hyperlipidemia  Hypertension  CAD (coronary artery disease)  Acute MI  No significant past surgical history       FAMILY HISTORY:  NC    Social History:Non smoker    MEDICATIONS  (STANDING):  pantoprazole  Injectable 40 milliGRAM(s) IV Push Once  pantoprazole  Injectable 40 milliGRAM(s) IV Push two times a day    MEDICATIONS  (PRN):   Meds reviewed    Allergies    clindamycin (Diarrhea)  penicillins (Hives)  sulfa drugs (Rash)    Intolerances         REVIEW OF SYSTEMS:    CONSTITUTIONAL:  Fatigue   EYES: No eye pain, visual disturbances, or discharge  ENMT:  No difficulty hearing, tinnitus, vertigo; No sinus or throat pain  NECK: No pain or stiffness  BREASTS: No pain, masses, or nipple discharge  RESPIRATORY: No SOB. No wheeze. No GEORGES  CARDIOVASCULAR: No chest pain, palpitations, dizziness,   GASTROINTESTINAL: No abdominal or epigastric pain. No nausea, vomiting, or hematemesis; No diarrhea or constipation. + GI bleeding  GENITOURINARY: No dysuria, frequency, hematuria, or incontinence  NEUROLOGICAL: No headaches, memory loss, loss of strength, numbness, or tremors  SKIN: no Diffuse erythema, no blisters  LYMPH NODES: No enlarged glands  ENDOCRINE: No heat or cold intolerance; No hair loss  MUSCULOSKELETAL: No joint pain or swelling   PSYCHIATRIC: No depression, anxiety, mood swings, or difficulty sleeping  HEME/LYMPH: No easy bruising, or bleeding gums  ALLERGY AND IMMUNOLOGIC: No hives or eczema      ICU Vital Signs Last 24 Hrs  T(C): 36.6 (26 Jun 2020 13:30), Max: 37.8 (26 Jun 2020 02:27)  T(F): 97.9 (26 Jun 2020 13:30), Max: 100.1 (26 Jun 2020 02:27)  HR: 67 (26 Jun 2020 13:30) (61 - 82)  BP: 127/68 (26 Jun 2020 13:30) (119/66 - 151/63)  BP(mean): --  ABP: --  ABP(mean): --  RR: 17 (26 Jun 2020 13:30) (16 - 20)  SpO2: 98% (26 Jun 2020 13:30) (94% - 100%)      PHYSICAL EXAM:    GENERAL: No acute distress  HEAD:  Atraumatic, Normocephalic  EYES: Conjunctiva and sclera clear  ENMT: No tonsillar erythema, exudates, or enlargement; Moist mucous membranes, Good dentition, No lesions  NECK: Supple, neck  veins full  NERVOUS SYSTEM:  Alert & Oriented X3, Good concentration; Motor Strength wnl upper and lower extremities  CHEST/LUNG: Clear to percussion bilaterally; No rales, rhonchi, wheezing, or rubs  HEART: Regular rate and rhythm; No murmurs, rubs, or gallops  ABDOMEN: Soft, Nontender, Nondistended; Bowel sounds present,  EXTREMITIES:  +1 Edema  SKIN: No rashes or lesions, pale      LABS:             Reviewed; K: 3.3

## 2020-06-30 NOTE — PROGRESS NOTE ADULT - PROBLEM SELECTOR PLAN 1
Acute on chronic GI bleed, pt has history of bright red blood per rectum since may 2019  -continue IV protonix BID   +FOBT  - s/p colonoscopy found to have colonic mass  -apprec gen surg recs dr arenas, ct scans to eval for malignancy as above  -get covid test if not within 72 hours of surgery, typer and screen as well  -apprec onco recs

## 2020-06-30 NOTE — PROGRESS NOTE ADULT - SUBJECTIVE AND OBJECTIVE BOX
[facility] -- Noe Land, Jamel Sung, Yuri Ruvalcaba Savella, Goodger: Office # [phone]    Follow Up:  Gi bleed, CAD    Subjective/Observations: Patient seen and examined. Patient awake and alert, resting comfortably in bed. No complaints of chest pain, SOB, LE edema, cough. No signs of orthopnea or PND.    REVIEW OF SYSTEMS: All review of systems is negative for eye, ENT, GI, , allergic, dermatologic, musculoskeletal and neurologic except as described above    PAST MEDICAL & SURGICAL HISTORY:  Asthma  Melanoma in situ  Hypothyroid  H/O Clostridium difficile infection  Urinary retention  Plaque psoriasis  History of urinary self-catheterization  Arthritis  Prostate CA: seed therapy aug 2009  Hyperlipidemia  Hypertension  Hypertension  CAD (coronary artery disease)  Acute MI  S/P tonsillectomy  H/O hernia repair  S/P cardiac cath    MEDICATIONS  (STANDING):  ferrous    sulfate 325 milliGRAM(s) Oral daily  finasteride 5 milliGRAM(s) Oral daily  fluticasone propionate 50 MICROgram(s)/spray Nasal Spray 2 Spray(s) Both Nostrils <User Schedule>  hydrochlorothiazide 12.5 milliGRAM(s) Oral daily  levothyroxine 25 MICROGram(s) Oral daily  loratadine 10 milliGRAM(s) Oral daily  metoprolol succinate  milliGRAM(s) Oral daily  montelukast 10 milliGRAM(s) Oral at bedtime  pantoprazole  Injectable 40 milliGRAM(s) IV Push two times a day  phenazopyridine 100 milliGRAM(s) Oral every 8 hours  potassium chloride    Tablet ER 40 milliEquivalent(s) Oral once  predniSONE   Tablet 1 milliGRAM(s) Oral four times a day  simvastatin 40 milliGRAM(s) Oral at bedtime  tamsulosin 0.4 milliGRAM(s) Oral at bedtime    MEDICATIONS  (PRN):    Allergies  cephalexin (Urticaria)  clindamycin (Diarrhea)  erythromycin (Other)  Methotrexate Sodium (Unknown)  penicillins (Hives)  sporalin (oxypsoralin/PUVA) (Other)  sulfa drugs (Rash)    Vital Signs Last 24 Hrs  T(C): 36.7 (30 Jun 2020 05:04), Max: 37.3 (29 Jun 2020 21:09)  T(F): 98.1 (30 Jun 2020 05:04), Max: 99.2 (29 Jun 2020 21:09)  HR: 62 (30 Jun 2020 10:44) (62 - 77)  BP: 132/63 (30 Jun 2020 10:44) (131/73 - 156/71)  BP(mean): --  RR: 16 (30 Jun 2020 05:04) (15 - 16)  SpO2: 93% (30 Jun 2020 10:44) (92% - 98%)    I&O's Summary    29 Jun 2020 07:01  -  30 Jun 2020 07:00  --------------------------------------------------------  IN: 0 mL / OUT: 2250 mL / NET: -2250 mL    TELE: SR 60-70s with rare PVC  PHYSICAL EXAM:  Appearance: NAD, no distress, alert, Frail   HEENT: Moist Mucous Membranes, Anicteric  Cardiovascular: Regular rate and rhythm, Normal S1 S2, No JVD, No murmurs, No rubs, gallops or clicks  Respiratory: Non-labored, Clear to auscultation, No rales, No rhonchi, No wheezing.   Gastrointestinal:  Soft, Non-tender, + BS  Neurologic: Non-focal  Skin: Warm and dry, No visible rashes or ulcers, No ecchymosis, No cyanosis  Musculoskeletal: No clubbing, No cyanosis, No joint swelling/tenderness  Psychiatry: Mood & affect appropriate  Lymph: No peripheral edema.     LABS: All Labs Reviewed:                        10.2   14.29 )-----------( 299      ( 30 Jun 2020 07:41 )             32.6                         9.7    12.91 )-----------( 285      ( 29 Jun 2020 06:24 )             30.5                         9.6    x     )-----------( x        ( 28 Jun 2020 14:16 )             30.8     30 Jun 2020 07:41    142    |  110    |  8      ----------------------------<  82     3.3     |  27     |  0.78   29 Jun 2020 13:49    143    |  112    |  10     ----------------------------<  80     3.5     |  25     |  0.71   28 Jun 2020 06:53    143    |  112    |  10     ----------------------------<  81     3.5     |  26     |  0.71     Ca    8.6        30 Jun 2020 07:41  Ca    8.4        29 Jun 2020 13:49  Ca    8.2        28 Jun 2020 06:53  Phos  2.5       28 Jun 2020 06:53    TPro  5.3    /  Alb  2.3    /  TBili  0.5    /  DBili  x      /  AST  64     /  ALT  53     /  AlkPhos  147    30 Jun 2020 07:41  TPro  5.3    /  Alb  2.4    /  TBili  0.7    /  DBili  x      /  AST  53     /  ALT  46     /  AlkPhos  107    28 Jun 2020 06:53    12 Lead ECG:   Ventricular Rate 69 BPM  Atrial Rate 69 BPM  P-R Interval 134 ms  QRS Duration 90 ms  Q-T Interval 440 ms  QTC Calculation(Bezet) 471 ms  P Axis 61 degrees  R Axis -31 degrees  T Axis 133 degrees  Diagnosis Line Normal sinus rhythm  Left axis deviation  Left ventricular hypertrophy with repolarization abnormality  Inferior infarct (cited on or before 29-MAR-2010)  Nonspecific ST abnormality  Abnormal ECG  Confirmed by francois Kaplan ([id number]) on 6/25/2020 2:14:26 PM (06-25-20 @ 12:09)    < from: CT Chest w/ Oral Cont and w/ IV Cont (06.29.20 @ 16:59) >  CHEST:  LUNGS AND LARGE AIRWAYS: PLEURA:   There are small bilateral pleural effusions with underlying compressive atelectasis.  The central airways remain patent.  VESSELS: Atherosclerotic calcification of thoracic aorta and coronary arteries.  HEART: Heart size is normal. No pericardial effusion.  MEDIASTINUM AND SOURAV: No lymphadenopathy.  CHEST WALL AND LOWER NECK: Within normal limits.    ABDOMEN AND PELVIS:  LIVER: There is a large conglomerate heterogeneous mass replacing the majority of the right hepatic lobe liver parenchyma. There is nodular contour of the liver and several low density lesions throughout the left hepatic lobe. Findings are suggestive of diffuse metastatic disease.  BILE DUCTS: Normal caliber.  GALLBLADDER: 6 mm polypoid density projecting within the lumen of the gallbladder could reflect polyp or gallstone. No gallbladder wall thickening.  SPLEEN: Within normal limits.  Probable accessory splenules.  PANCREAS: Within normal limits.  ADRENALS: Within normal limits.  KIDNEYS/URETERS: Large cyst upper pole left kidney. Smaller indeterminate hypodense bilateral renal lesions. Bilateral extrarenal pelvises.  Evaluation of the pelvis is limited due to extensive metallic streak artifact related to patient's bilateral hip arthroplasties.  BLADDER: Limited evaluation.  Possible left-sided bladder diverticulum.  REPRODUCTIVE ORGANS: Limited evaluation. Radiation seed implants are present.  BOWEL: Evaluation of the rectosigmoid colon limited due to metallic streak artifact.  Sigmoid diverticulosis with segmental bowel wall thickening.  The descending colon is underdistended.  No bowel obstruction noted.  PERITONEUM: There is small to moderate free fluid within the pelvis. There is mild nodularity along the peritoneal surface, example, image 77, series 2.  VESSELS: Atherosclerotic calcification.  RETROPERITONEUM/LYMPH NODES: No enlarged retroperitoneal lymphadenopathy.    ABDOMINAL WALL: Within normal limits.  BONES: Mottled lytic and sclerotic appearance bilateral posterior iliac bones with scattered sclerotic foci within the pelvis; and T6 and T7 vertebral bodies; findings suspicious for osseous metastatic disease.    IMPRESSION:     Small bilateral pleural effusions and underlying compressive atelectasis.  CT findings suggestive of diffuse hepatic metastatic disease.  Small to moderate pelvic ascites.  Mild peritoneal surface nodularity.  Findings may reflect peritoneal carcinomatosis.  Findings suspicious for osseous metastatic disease as discussed.  Recommend further evaluation with PET/CT scan.  Other findings as discussed above.    < end of copied text >    < from: Xray Chest 1 View- PORTABLE-Routine (06.25.20 @ 12:15) >  Heart magnified by projection. Atherosclerotic aorta. Occasional bilateral calcified granulomas. No acute infiltrate or pleural effusion.    Impression: No active disease    < end of copied text >

## 2020-06-30 NOTE — PROGRESS NOTE ADULT - SUBJECTIVE AND OBJECTIVE BOX
All interim records and events noted.    comfortable at present, awaiting palliative sigmoid colon resection      MEDICATIONS  (STANDING):  ferrous    sulfate 325 milliGRAM(s) Oral daily  finasteride 5 milliGRAM(s) Oral daily  fluticasone propionate 50 MICROgram(s)/spray Nasal Spray 2 Spray(s) Both Nostrils <User Schedule>  hydrochlorothiazide 12.5 milliGRAM(s) Oral daily  levothyroxine 25 MICROGram(s) Oral daily  loratadine 10 milliGRAM(s) Oral daily  metoprolol succinate  milliGRAM(s) Oral daily  montelukast 10 milliGRAM(s) Oral at bedtime  pantoprazole  Injectable 40 milliGRAM(s) IV Push two times a day  phenazopyridine 100 milliGRAM(s) Oral every 8 hours  potassium chloride    Tablet ER 40 milliEquivalent(s) Oral once  predniSONE   Tablet 1 milliGRAM(s) Oral four times a day  simvastatin 40 milliGRAM(s) Oral at bedtime  tamsulosin 0.4 milliGRAM(s) Oral at bedtime    MEDICATIONS  (PRN):      Vital Signs Last 24 Hrs  T(C): 36.7 (30 Jun 2020 05:04), Max: 37.3 (29 Jun 2020 21:09)  T(F): 98.1 (30 Jun 2020 05:04), Max: 99.2 (29 Jun 2020 21:09)  HR: 70 (30 Jun 2020 05:04) (67 - 77)  BP: 151/72 (30 Jun 2020 05:04) (131/73 - 156/71)  BP(mean): --  RR: 16 (30 Jun 2020 05:04) (12 - 16)  SpO2: 98% (30 Jun 2020 05:04) (92% - 98%)    PHYSICAL EXAM  General: well developed thin frail, up in chair, in no acute distress  Head: atraumatic, normocephalic  ENT: sclera anicteric, buccal mucosa moist  Neck: supple, trachea midline, no palpable masses  CV: S1 S2, regular rate and rhythm  Lungs: clear to auscultation, no wheezes/rhonchi  Abdomen: soft, nontender, bowel sounds present, no palpable masses  Extrem: no clubbing/cyanosis/edema  Skin: no significant increased ecchymosis/petechiae  Neuro: alert and oriented X3,  no focal deficits      LABS:             10.2   14.29 )-----------( 299      ( 06-30 @ 07:41 )             32.6                9.7    12.91 )-----------( 285      ( 06-29 @ 06:24 )             30.5                9.6    x     )-----------( x        ( 06-28 @ 14:16 )             30.8                9.7    11.26 )-----------( 276      ( 06-28 @ 06:53 )             30.9       06-30    142  |  110<H>  |  8   ----------------------------<  82  3.3<L>   |  27  |  0.78    Ca    8.6      30 Jun 2020 07:41    TPro  5.3<L>  /  Alb  2.3<L>  /  TBili  0.5  /  DBili  x   /  AST  64<H>  /  ALT  53  /  AlkPhos  147<H>  06-30 06-25 @ 11:48  PT12.7 INR1.13  PTT31.7      RADIOLOGY & ADDITIONAL STUDIES:    IMPRESSION/RECOMMENDATIONS:  < from: CT Chest w/ Oral Cont and w/ IV Cont (06.29.20 @ 16:59) >    EXAM:  CT ABDOMEN AND PELVIS OC IC                          EXAM:  CT CHEST OC IC                            PROCEDURE DATE:  06/29/2020          INTERPRETATION:  CLINICAL INFORMATION: Anemia. Obstructing sigmoid mass on colonoscopy.  Gastric polyps.    COMPARISON: None available.    PROCEDURE:   CT of the Chest, Abdomen and Pelvis was performed with intravenous contrast.   Intravenous contrast: 90 ml Omnipaque 350. 10 ml discarded.  Oral contrast: None.  Sagittal and coronal reformats were performed.    FINDINGS:     CHEST:    LUNGS AND LARGE AIRWAYS: PLEURA:   There are small bilateral pleural effusions with underlying compressive atelectasis.  The central airways remain patent.    VESSELS: Atherosclerotic calcification of thoracic aorta and coronary arteries.    HEART: Heart size is normal. No pericardial effusion.    MEDIASTINUM AND SOURAV: No lymphadenopathy.    CHEST WALL AND LOWER NECK: Within normal limits.    ABDOMEN AND PELVIS:    LIVER: There is a large conglomerate heterogeneous mass replacing the majority of the right hepatic lobe liver parenchyma. There is nodular contour of the liver and several low density lesions throughout the left hepatic lobe. Findings are suggestive of diffuse metastatic disease.  BILE DUCTS: Normal caliber.  GALLBLADDER: 6 mm polypoid density projecting within the lumen of the gallbladder could reflect polyp or gallstone. No gallbladder wall thickening.  SPLEEN: Within normal limits.  Probable accessory splenules.  PANCREAS: Within normal limits.  ADRENALS: Within normal limits.  KIDNEYS/URETERS: Large cyst upper pole left kidney. Smaller indeterminate hypodense bilateral renal lesions.    Bilateral extrarenal pelvises.    Evaluation of the pelvis is limited due to extensive metallic streak artifact related to patient's bilateral hip arthroplasties.  BLADDER: Limited evaluation.  Possible left-sided bladder diverticulum.  REPRODUCTIVE ORGANS: Limited evaluation. Radiation seed implants are present.    BOWEL: Evaluation of the rectosigmoid colon limited due to metallic streak artifact.  Sigmoid diverticulosis with segmental bowel wall thickening.  The descending colon is underdistended.  No bowel obstruction noted.    PERITONEUM: There is small to moderate free fluid within the pelvis.  There is mild nodularity along the peritoneal surface, example, image 77, series 2.    VESSELS: Atherosclerotic calcification.  RETROPERITONEUM/LYMPH NODES: No enlarged retroperitoneal lymphadenopathy.      ABDOMINAL WALL: Within normal limits.    BONES: Mottled lytic and sclerotic appearance bilateral posterior iliac bones with scattered sclerotic foci within the pelvis; and T6 and T7 vertebral bodies; findings suspicious for osseous metastatic disease.    IMPRESSION:     Small bilateral pleural effusions and underlying compressive atelectasis.    CT findings suggestive of diffuse hepatic metastatic disease.    Small to moderate pelvic ascites.  Mild peritoneal surface nodularity.  Findings may reflect peritoneal carcinomatosis.    Findings suspicious for osseous metastatic disease as discussed.    Recommend further evaluation with PET/CT scan.    Other findings as discussed above.      < end of copied text >

## 2020-06-30 NOTE — PROGRESS NOTE ADULT - ASSESSMENT
81y/o M PMH of CAD, HTN, HLD, prostate cancer (seed radiation 2009), MI (nov 2000, angioplasty w 3 stents to the right coronary artery), rheumatoid and psoriatic arthritis, plaque psoriasis, b/l hip replacements (left 1998, right 2002), C diff in 2014, urinary retention with self catheretization hypothyroidism, mild asthma, melanoma in situ presents for GIB. Pt states he noticed right red blood in his stools for 6 months. Pt has been feeling weakness, weight loss of 19 pounds since June 2019. Pt sent in by outpatient hematologist Dr. Bergeron given low Hgb outpatient. (6.8 on 6/24) Pt denies SOB or GEORGES, however feels weak when pushing heavy objects. Pt was scheduled with his GI doctor Dr. Mullen for outpatient colonoscopy in March 2020. However, colonoscopy canceled due to COVID19. Pt self catheterizes 5-6 times a day.    Sigmoid mass   - Colonoscopy revealed large fungating near obstructing sigmoid colon mass.   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 Awaiting palliative colon resection, Possible OR Thursday   - Continue Perez   - Manage per surgery/Heme/Primary/GI    CAD S/P stent  - EKG is unchanged when compared to prior  - Pt w/o anginal complaints  - There is no evidence of significant arrhythmia.  - There is no evidence for meaningful  volume overload.  - TTE 12/1/18 mild AS, LVD,45 %, no need to repeat  - Continue Toprol  mg PO daily  - Continue Statin     HTN  - BP: 132/63 (06-30-20 @ 10:44) (131/73 - 156/71)  - Continue BB at home dose  - Continue HCTZ  - Monitor routine hemodynamics.     HLD  - Continue statin    Anemia  - Hemoglobin: 10.2 (06-30-20 @ 07:41) Hematocrit: 32.6 (06-30-20 @ 07:41)  - S/P PRBCs  - Trend CBC  - Would transfuse to keep Hb>8 and watch volume status     MARY  - Creatinine, Serum: 0.78 mg/dL (06-30-20 @ 07:41)  - resolved     - Monitor and replete lytes, keep K>4, Mg>2.  - All other medical needs as per primary team.  - Other cardiovascular workup will depend on clinical course.  - Will continue to follow.      Ambili Mitchel, MS FNP, [facility]  Nurse Practitioner- Cardiology   Spectra #[phone]

## 2020-06-30 NOTE — PROGRESS NOTE ADULT - ASSESSMENT
79 y/o man known to our office followed for iron deficiency anemia, admitted for sig precipitous drop of H/H to Hgb 7+  Hx of known lower GI bleed ?radiation proctitis  Post tx 2U PRBC since admission, w appropriate H/H response and continued increase    -colonoscopy w large fungating near obstructing sigmoid colon mass. 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  -seen by surgery, for palliative colon resection   -discussed w pt, who is aware of significance of findings.

## 2020-06-30 NOTE — PROGRESS NOTE ADULT - SUBJECTIVE AND OBJECTIVE BOX
Patient reports feeling comfortable.  Denies nausea, vomiting, abdominal pain.  He has been having watery stools.                  MEDICATIONS  (STANDING):  ferrous    sulfate 325 milliGRAM(s) Oral daily  finasteride 5 milliGRAM(s) Oral daily  fluticasone propionate 50 MICROgram(s)/spray Nasal Spray 2 Spray(s) Both Nostrils <User Schedule>  hydrochlorothiazide 12.5 milliGRAM(s) Oral daily  levothyroxine 25 MICROGram(s) Oral daily  loratadine 10 milliGRAM(s) Oral daily  metoprolol succinate  milliGRAM(s) Oral daily  montelukast 10 milliGRAM(s) Oral at bedtime  pantoprazole  Injectable 40 milliGRAM(s) IV Push two times a day  phenazopyridine 100 milliGRAM(s) Oral every 8 hours  potassium chloride    Tablet ER 40 milliEquivalent(s) Oral once  predniSONE   Tablet 1 milliGRAM(s) Oral four times a day  simvastatin 40 milliGRAM(s) Oral at bedtime  tamsulosin 0.4 milliGRAM(s) Oral at bedtime    MEDICATIONS  (PRN):       PHYSICAL EXAM:  Constitutional:  Comfortable appearing  HEENT: NCAT, anicteric sclera, moist mucous membranes  Respiratory:  CTA b/l  Cardiovascular:  nl S1, S2, no m/r/g  Gastrointestinal:  Soft, +BS, NT, ND  Extremities:  no E/C/C  Neurological:  A&O x 3.  Skin:  no Jaundice                     10.2   14.29 )-----------( 299      ( 30 Jun 2020 07:41 )             32.6     06-30    142  |  110<H>  |  8   ----------------------------<  82  3.3<L>   |  27  |  0.78    Ca    8.6      30 Jun 2020 07:41    TPro  5.3<L>  /  Alb  2.3<L>  /  TBili  0.5  /  DBili  x   /  AST  64<H>  /  ALT  53  /  AlkPhos  147<H>  06-30

## 2020-06-30 NOTE — PROGRESS NOTE ADULT - ASSESSMENT
81 YO M with pmhx of CAD, HTN, HLD, prostate cancer (seed radiation 2009), MI (nov 2000, angioplasty w 3 stents to the right coronary artery), rheumatoid and psoriatic arthritis, plaque psoriasis, b/l hip replacements (left 1998, right 2002), C. diff in 2014, urinary retention with self catheretization hypothyroidism, mild asthma, melanoma in situ, admitted for GIB s/p colonoscopy found to have colonic mass,     CT abd/pelvs/chest: IMPRESSION:  Small bilateral pleural effusions and underlying compressive atelectasis.  CT findings suggestive of diffuse hepatic metastatic disease. Small to moderate pelvic ascites. Mild peritoneal surface nodularity. Findings may reflect peritoneal carcinomatosis. Findings suspicious for osseous metastatic disease as discussed.  Recommend further evaluation with PET/CT scan. Other findings as discussed above.

## 2020-06-30 NOTE — PROGRESS NOTE ADULT - ASSESSMENT
MARY on CKD 2  GI bleeding  HTN  Anemia  Sigmoid Mass    -BLCr 0.9  -MARY likely 2/2 hypoxic/prerenal injury driven by GI bleeding  -UA 30 protein  -Ur lytes reviewed   -Renal indices remain stable at baseline range  -PRBCs per primary; Hb currently stable  -BP rising again; HCTZ started; will monitor  -Surg eval noted; may benefit from palliative diverting colostomy vs resection    Thank you MARY on CKD 2  GI bleeding  HTN  Anemia  Sigmoid Mass  Hypokalemia    -BLCr 0.9  -MARY likely 2/2 hypoxic/prerenal injury driven by GI bleeding  -UA 30 protein  -Ur lytes reviewed   -Renal indices remain stable at baseline range  -PRBCs per primary; Hb currently stable  -BP rising again; HCTZ started; will monitor  -Surg eval noted; may benefit from palliative diverting colostomy vs resection  -KCl ordered    Thank you

## 2020-06-30 NOTE — PROGRESS NOTE ADULT - SUBJECTIVE AND OBJECTIVE BOX
Patient is a 80y old  Male who presents with a chief complaint of Anemia (30 Jun 2020 10:56)      INTERVAL HPI: Pt seen and examined. States he is lactose intolerant and needs lactaid, has some concerns about his prognosis and diagnosis but has been pleasantly appreciative of all his care. Denies any other acute complaints at this time.     OVERNIGHT EVENTS: none noted  T(F): 98 (06-30-20 @ 13:03), Max: 99.2 (06-29-20 @ 21:09)  HR: 66 (06-30-20 @ 13:03) (62 - 77)  BP: 132/64 (06-30-20 @ 13:03) (131/73 - 151/72)  RR: 17 (06-30-20 @ 13:03) (16 - 17)  SpO2: 97% (06-30-20 @ 13:03) (93% - 98%)  I&O's Summary    29 Jun 2020 07:01  -  30 Jun 2020 07:00  --------------------------------------------------------  IN: 0 mL / OUT: 2250 mL / NET: -2250 mL        REVIEW OF SYSTEMS:  CONSTITUTIONAL: No fever, weight loss, or fatigue  EYES: No eye pain, visual disturbances, or discharge  ENMT:  No difficulty hearing, tinnitus, vertigo; No sinus or throat pain  NECK: No pain or stiffness  BREASTS: No pain, masses, or nipple discharge  RESPIRATORY: No cough, wheezing, chills or hemoptysis; No shortness of breath  CARDIOVASCULAR: No chest pain, palpitations, dizziness, or leg swelling  GASTROINTESTINAL: No abdominal or epigastric pain. No nausea, vomiting, or hematemesis; No diarrhea or constipation. No melena or hematochezia.  GENITOURINARY: No dysuria, frequency, hematuria, or incontinence  NEUROLOGICAL: No headaches, memory loss, loss of strength, numbness, or tremors  SKIN: No itching, burning, rashes, or lesions   LYMPH NODES: No enlarged glands  ENDOCRINE: No heat or cold intolerance; No hair loss  MUSCULOSKELETAL: No joint pain or swelling; No muscle, back, or extremity pain  PSYCHIATRIC: No depression, anxiety, mood swings, or difficulty sleeping  HEME/LYMPH: No easy bruising, or bleeding gums  ALLERY AND IMMUNOLOGIC: No hives or eczema    PHYSICAL EXAM:  GENERAL: NAD, well-groomed, well-developed  HEAD:  Atraumatic, Normocephalic  EYES: EOMI, PERRLA, conjunctiva and sclera clear  ENMT: No tonsillar erythema, exudates, or enlargement; Moist mucous membranes, Good dentition, No lesions  NECK: Supple, No JVD, Normal thyroid  NERVOUS SYSTEM:  Alert & Oriented X3, Good concentration; Motor Strength 5/5 B/L upper and lower extremities; DTRs 2+ intact and symmetric  CHEST/LUNG: Clear to percussion bilaterally; No rales, rhonchi, wheezing, or rubs  HEART: Regular rate and rhythm; No murmurs, rubs, or gallops  ABDOMEN: Soft, Nontender, Nondistended; Bowel sounds present  EXTREMITIES:  2+ Peripheral Pulses, No clubbing, cyanosis, or edema  LYMPH: No lymphadenopathy noted  SKIN: No rashes or lesions    LABS:                        10.2   14.29 )-----------( 299      ( 30 Jun 2020 07:41 )             32.6     06-30    142  |  110<H>  |  8   ----------------------------<  82  3.3<L>   |  27  |  0.78    Ca    8.6      30 Jun 2020 07:41    TPro  5.3<L>  /  Alb  2.3<L>  /  TBili  0.5  /  DBili  x   /  AST  64<H>  /  ALT  53  /  AlkPhos  147<H>  06-30        CAPILLARY BLOOD GLUCOSE          06-28 @ 21:16   No growth  --  --          MEDICATIONS  (STANDING):  ferrous    sulfate 325 milliGRAM(s) Oral daily  finasteride 5 milliGRAM(s) Oral daily  fluticasone propionate 50 MICROgram(s)/spray Nasal Spray 2 Spray(s) Both Nostrils <User Schedule>  hydrochlorothiazide 12.5 milliGRAM(s) Oral daily  levothyroxine 25 MICROGram(s) Oral daily  loratadine 10 milliGRAM(s) Oral daily  metoprolol succinate  milliGRAM(s) Oral daily  montelukast 10 milliGRAM(s) Oral at bedtime  pantoprazole  Injectable 40 milliGRAM(s) IV Push two times a day  phenazopyridine 100 milliGRAM(s) Oral every 8 hours  predniSONE   Tablet 1 milliGRAM(s) Oral four times a day  simvastatin 40 milliGRAM(s) Oral at bedtime  tamsulosin 0.4 milliGRAM(s) Oral at bedtime    MEDICATIONS  (PRN): Patient is a 80y old  Male who presents with a chief complaint of Anemia (30 Jun 2020 10:56)      INTERVAL HPI: Pt seen and examined. States he is lactose intolerant and needs lactaid, has some concerns about his prognosis and diagnosis but has been pleasantly appreciative of all his care. Denies any other acute complaints at this time.     OVERNIGHT EVENTS: none noted  T(F): 98 (06-30-20 @ 13:03), Max: 99.2 (06-29-20 @ 21:09)  HR: 66 (06-30-20 @ 13:03) (62 - 77)  BP: 132/64 (06-30-20 @ 13:03) (131/73 - 151/72)  RR: 17 (06-30-20 @ 13:03) (16 - 17)  SpO2: 97% (06-30-20 @ 13:03) (93% - 98%)  I&O's Summary    29 Jun 2020 07:01  -  30 Jun 2020 07:00  --------------------------------------------------------  IN: 0 mL / OUT: 2250 mL / NET: -2250 mL        REVIEW OF SYSTEMS:  CONSTITUTIONAL: No fever, weight loss, or fatigue  RESPIRATORY: No cough, wheezing, chills or hemoptysis; No shortness of breath  CARDIOVASCULAR: No chest pain, palpitations, dizziness, or leg swelling  GASTROINTESTINAL: No abdominal or epigastric pain. No nausea, vomiting, or hematemesis; + diarrhea No constipation. No melena or hematochezia.  GENITOURINARY: + hematuric clots, urge, discomfort  NEUROLOGICAL: No headaches, memory loss, loss of strength, numbness, or tremors  SKIN: No itching, burning, rashes, or lesions   MUSCULOSKELETAL: No joint pain or swelling; No muscle, back, or extremity pain  PSYCHIATRIC: No depression, anxiety, mood swings, or difficulty sleeping      PHYSICAL EXAM:  GENERAL: NAD, well-groomed, elder, frail  NERVOUS SYSTEM:  Alert & Oriented X3,  Motor Strength 3/5 B/L upper and lower extremities  CHEST/LUNG: Clear to percussion bilaterally; No rales, rhonchi, wheezing, or rubs  HEART: Regular rate and rhythm; No murmurs, rubs, or gallops  ABDOMEN: Soft, Nontender, Nondistended; Bowel sounds present  EXTREMITIES:  2+ Peripheral Pulses, No clubbing, cyanosis, or edema  SKIN: No rashes or lesions    LABS:                        10.2   14.29 )-----------( 299      ( 30 Jun 2020 07:41 )             32.6     06-30    142  |  110<H>  |  8   ----------------------------<  82  3.3<L>   |  27  |  0.78    Ca    8.6      30 Jun 2020 07:41    TPro  5.3<L>  /  Alb  2.3<L>  /  TBili  0.5  /  DBili  x   /  AST  64<H>  /  ALT  53  /  AlkPhos  147<H>  06-30        CAPILLARY BLOOD GLUCOSE          06-28 @ 21:16   No growth  --  --          MEDICATIONS  (STANDING):  ferrous    sulfate 325 milliGRAM(s) Oral daily  finasteride 5 milliGRAM(s) Oral daily  fluticasone propionate 50 MICROgram(s)/spray Nasal Spray 2 Spray(s) Both Nostrils <User Schedule>  hydrochlorothiazide 12.5 milliGRAM(s) Oral daily  levothyroxine 25 MICROGram(s) Oral daily  loratadine 10 milliGRAM(s) Oral daily  metoprolol succinate  milliGRAM(s) Oral daily  montelukast 10 milliGRAM(s) Oral at bedtime  pantoprazole  Injectable 40 milliGRAM(s) IV Push two times a day  phenazopyridine 100 milliGRAM(s) Oral every 8 hours  predniSONE   Tablet 1 milliGRAM(s) Oral four times a day  simvastatin 40 milliGRAM(s) Oral at bedtime  tamsulosin 0.4 milliGRAM(s) Oral at bedtime    MEDICATIONS  (PRN):

## 2020-06-30 NOTE — PROGRESS NOTE ADULT - ASSESSMENT
IMPRESSION:   Near obstructing mass in sigmoid colon with mets to liver, and bone.         PLAN:  Awaiting palliative colon resection  Follow up with medical oncology

## 2020-06-30 NOTE — CONSULT NOTE ADULT - CONSULT REQUESTED DATE/TIME
25-Jun-2020 17:24
25-Jun-2020 14:09
25-Jun-2020 22:59
27-Jun-2020 06:53
28-Jun-2020 22:29
29-Jun-2020 23:21
30-Jun-2020 06:21

## 2020-07-01 NOTE — PROGRESS NOTE ADULT - PROBLEM SELECTOR PLAN 1
planned for OR - tomorrow - discussed with Dr. Ross   sigmoid mass - GI eval noted  Surgery eval reviewed - Palliative Diverting Colostomy -   pt has hx of Pulm nodules - and reactive airway disease - Asthma - follows with Dr. Narayna - on Singulair -   on RA - vs noted  alert and oriented and verbal  discussed GOC and Code Status - pt is full code - he knows and understands his diagnosis, pt is a Root Canal Surgeon - retired -

## 2020-07-01 NOTE — PROGRESS NOTE ADULT - PROBLEM SELECTOR PROBLEM 3
GI bleed
Anemia due to blood loss
MARY (acute kidney injury)
MARY (acute kidney injury)
Anemia due to blood loss

## 2020-07-01 NOTE — PROGRESS NOTE ADULT - PROBLEM SELECTOR PLAN 10
Hypothyroidism - TSH elevated, continue home dose levothyroxine,   dvt ppx SCDs in setting of GI bleed  -hypertriglyceridemia: cont fenofibrate    -asthma: cont monteleukast  IMPROVE VTE Individual Risk Assessment        RISK                                                          Points  [  ] Previous VTE                                                3  [  ] Thrombophilia                                             2  [  ] Lower limb paralysis                                   2        (unable to hold up >15 seconds)    [ 2 ] Current Cancer                                            2         (within 6 months)  [  ] Immobilization > 24 hrs                              1  [  ] ICU/CCU stay > 24 hours                            1  [ 1 ] Age > 60                                                    1  IMPROVE VTE Score ____3_____
Hypothyroidism - continue home dose levothyroxine, f/u TSH  dvt ppx SCDs in setting of GI bleed  -hypertriglyceridemia: cont fenofibrate    -asthma: cont monteleukast  IMPROVE VTE Individual Risk Assessment        RISK                                                          Points  [  ] Previous VTE                                                3  [  ] Thrombophilia                                             2  [  ] Lower limb paralysis                                   2        (unable to hold up >15 seconds)    [ 2 ] Current Cancer                                            2         (within 6 months)  [  ] Immobilization > 24 hrs                              1  [  ] ICU/CCU stay > 24 hours                            1  [ 1 ] Age > 60                                                    1  IMPROVE VTE Score ____3_____

## 2020-07-01 NOTE — PROGRESS NOTE ADULT - PROBLEM SELECTOR PLAN 1
Acute on chronic GI bleed, pt has history of bright red blood per rectum since may 2019  -continue IV protonix BID   +FOBT  - s/p colonoscopy found to have colonic mass  -Surgery following (Dr. Ross), plan for palliative colon resection 7/2.  -Follow up preoperative COVID test results  -Heme Onc (Dr. Simental) following, recommendations noted Acute on chronic GI bleed, pt has history of bright red blood per rectum since may 2019  -continue IV protonix BID   +FOBT  - s/p colonoscopy found to have colonic mass  -Surgery following (Dr. Ross), plan for palliative colon resection 7/2.  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 Ross), plan for palliative colon resection 7/2.  -Follow up preoperative COVID test results  -Heme Onc Dr Bay recs apprec

## 2020-07-01 NOTE — PROGRESS NOTE ADULT - PROBLEM SELECTOR PROBLEM 8
CAD (coronary artery disease)
Hyperlipidemia
Hyperlipidemia
CAD (coronary artery disease)

## 2020-07-01 NOTE — PROGRESS NOTE ADULT - PROBLEM SELECTOR PLAN 3
-MARY on CKD2 likely 2/2 prerenal in setting of GI bleed  s/p IVF resolved, continue to monitor  -hold HCTZ, ACEI for now  -nephro consulted Salvatore Linda recs appreciated
-MARY on CKD2 likely 2/2 prerenal in setting of GI bleed  s/p IVF resolved, continue to monitor  -hold HCTZ, ACEI for now  -nephro consulted Salvatore Linda recs appreciated
-acute blood loss anemia in setting of GIB, stable  -s/p 2 units pRBCs  -H/H noted today stable  -transfuse for hgb<8  -Heme/Onc following, recommendations noted  -continue iron supplementation
acute blood loss anemia in setting of GIB, stable  s/p 1 unit prbc, will give additional unit now  transfuse for hgb<8  apprec hemat recs  continue iron supp

## 2020-07-01 NOTE — PROGRESS NOTE ADULT - SUBJECTIVE AND OBJECTIVE BOX
Patient is a 80y old  Male who presents with a chief complaint of Near obstructing sigmoid mass (01 Jul 2020 07:49)      INTERVAL HPI/OVERNIGHT EVENTS: Patient was seen and examined at the bedside this morning.  No significant events overnight. Patient continues to complain of suprapubic aching and discomfort. Denies current urethral burning or hematuria. Denies diarrhea but states he is very gassy. Denies nausea, vomiting, chest pain, shortness of breath, chills. Labs and vital signs reviewed. Hemodynamically stable. Patient is afebrile.      MEDICATIONS  (STANDING):  ferrous    sulfate 325 milliGRAM(s) Oral daily  finasteride 5 milliGRAM(s) Oral daily  fluticasone propionate 50 MICROgram(s)/spray Nasal Spray 2 Spray(s) Both Nostrils <User Schedule>  hydrochlorothiazide 12.5 milliGRAM(s) Oral daily  lactase Tablet 1 Tablet(s) Oral three times a day with meals  levothyroxine 25 MICROGram(s) Oral daily  loratadine 10 milliGRAM(s) Oral daily  metoprolol succinate  milliGRAM(s) Oral daily  montelukast 10 milliGRAM(s) Oral at bedtime  pantoprazole  Injectable 40 milliGRAM(s) IV Push two times a day  phenazopyridine 100 milliGRAM(s) Oral every 8 hours  predniSONE   Tablet 1 milliGRAM(s) Oral four times a day  simvastatin 40 milliGRAM(s) Oral at bedtime  tamsulosin 0.4 milliGRAM(s) Oral at bedtime    MEDICATIONS  (PRN):      Allergies    cephalexin (Urticaria)  clindamycin (Diarrhea)  erythromycin (Other)  Methotrexate Sodium (Unknown)  penicillins (Hives)  sporalin (oxypsoralin/PUVA) (Other)  sulfa drugs (Rash)    Intolerances        REVIEW OF SYSTEMS:  CONSTITUTIONAL: No fever or chills  HEENT:  No headache  RESPIRATORY: No cough, wheezing, or shortness of breath  CARDIOVASCULAR: No chest pain  GASTROINTESTINAL: No nausea, vomiting, or diarrhea, +suprapubic achiness and discomfort  GENITOURINARY: No frequency or hematuria, denies urethral burning  NEUROLOGICAL: no focal weakness or dizziness  MUSCULOSKELETAL: no myalgias     Vital Signs Last 24 Hrs  T(C): 36.6 (01 Jul 2020 05:10), Max: 36.9 (30 Jun 2020 21:29)  T(F): 97.8 (01 Jul 2020 05:10), Max: 98.5 (30 Jun 2020 21:29)  HR: 68 (01 Jul 2020 05:10) (62 - 71)  BP: 127/69 (01 Jul 2020 05:10) (127/69 - 132/64)  BP(mean): --  RR: 18 (01 Jul 2020 05:10) (17 - 18)  SpO2: 93% (01 Jul 2020 05:10) (93% - 98%)    PHYSICAL EXAM:  GENERAL: NAD  HEENT:  anicteric, moist mucous membranes  CHEST/LUNG:  CTA b/l, no rales, wheezes, or rhonchi  HEART:  RRR, S1, S2  ABDOMEN:  BS+, soft, nondistended, mild tenderness to palpation of suprapubic area  : paez in place dark urine present, urethral opening appears nonerythematous, without blood  EXTREMITIES: no edema, cyanosis, or calf tenderness  NERVOUS SYSTEM: answers questions and follows commands appropriately    LABS:                        9.5    13.65 )-----------( 269      ( 01 Jul 2020 06:16 )             30.2     CBC Full  -  ( 01 Jul 2020 06:16 )  WBC Count : 13.65 K/uL  Hemoglobin : 9.5 g/dL  Hematocrit : 30.2 %  Platelet Count - Automated : 269 K/uL  Mean Cell Volume : 87.0 fl  Mean Cell Hemoglobin : 27.4 pg  Mean Cell Hemoglobin Concentration : 31.5 gm/dL  Auto Neutrophil # : x  Auto Lymphocyte # : x  Auto Monocyte # : x  Auto Eosinophil # : x  Auto Basophil # : x  Auto Neutrophil % : x  Auto Lymphocyte % : x  Auto Monocyte % : x  Auto Eosinophil % : x  Auto Basophil % : x    01 Jul 2020 06:16    142    |  109    |  9      ----------------------------<  88     3.7     |  27     |  0.74     Ca    8.3        01 Jul 2020 06:16          CAPILLARY BLOOD GLUCOSE            Culture - Urine (collected 06-28-20 @ 21:16)  Source: .Urine Catheterized  Final Report (06-29-20 @ 16:33):    No growth        RADIOLOGY & ADDITIONAL TESTS:  No new imaging.     Personally reviewed.     Consultant(s) Notes Reviewed:  [x] YES  [ ] NO Patient is a 80y old  Male who presents with a chief complaint of Near obstructing sigmoid mass (01 Jul 2020 07:49)      INTERVAL HPI/OVERNIGHT EVENTS: Patient was seen and examined at the bedside this morning.  No significant events overnight. Patient continues to complain of suprapubic aching and discomfort. Denies current urethral burning or hematuria. Denies diarrhea but states he is very gassy. Denies nausea, vomiting, chest pain, shortness of breath, chills. Labs and vital signs reviewed. Hemodynamically stable. Patient is afebrile.      MEDICATIONS  (STANDING):  ferrous    sulfate 325 milliGRAM(s) Oral daily  finasteride 5 milliGRAM(s) Oral daily  fluticasone propionate 50 MICROgram(s)/spray Nasal Spray 2 Spray(s) Both Nostrils <User Schedule>  hydrochlorothiazide 12.5 milliGRAM(s) Oral daily  lactase Tablet 1 Tablet(s) Oral three times a day with meals  levothyroxine 25 MICROGram(s) Oral daily  loratadine 10 milliGRAM(s) Oral daily  metoprolol succinate  milliGRAM(s) Oral daily  montelukast 10 milliGRAM(s) Oral at bedtime  pantoprazole  Injectable 40 milliGRAM(s) IV Push two times a day  phenazopyridine 100 milliGRAM(s) Oral every 8 hours  predniSONE   Tablet 1 milliGRAM(s) Oral four times a day  simvastatin 40 milliGRAM(s) Oral at bedtime  tamsulosin 0.4 milliGRAM(s) Oral at bedtime    MEDICATIONS  (PRN):      Allergies    cephalexin (Urticaria)  clindamycin (Diarrhea)  erythromycin (Other)  Methotrexate Sodium (Unknown)  penicillins (Hives)  sporalin (oxypsoralin/PUVA) (Other)  sulfa drugs (Rash)    Intolerances        REVIEW OF SYSTEMS:  CONSTITUTIONAL: No fever or chills  HEENT:  No headache  RESPIRATORY: No cough, wheezing, or shortness of breath  CARDIOVASCULAR: No chest pain  GASTROINTESTINAL: No nausea, vomiting, or diarrhea, +suprapubic achiness and discomfort  GENITOURINARY: No frequency or hematuria, denies urethral burning  NEUROLOGICAL: no focal weakness or dizziness  MUSCULOSKELETAL: no myalgias     Vital Signs Last 24 Hrs  T(C): 36.6 (01 Jul 2020 05:10), Max: 36.9 (30 Jun 2020 21:29)  T(F): 97.8 (01 Jul 2020 05:10), Max: 98.5 (30 Jun 2020 21:29)  HR: 68 (01 Jul 2020 05:10) (62 - 71)  BP: 127/69 (01 Jul 2020 05:10) (127/69 - 132/64)  BP(mean): --  RR: 18 (01 Jul 2020 05:10) (17 - 18)  SpO2: 93% (01 Jul 2020 05:10) (93% - 98%)    PHYSICAL EXAM:  GENERAL: NAD, elder  HEENT:  anicteric, moist mucous membranes  CHEST/LUNG:  CTA b/l, no rales, wheezes, or rhonchi  HEART:  RRR, S1, S2  ABDOMEN:  BS+, soft, nondistended, mild tenderness to palpation of suprapubic area  : paez in place dark urine present, urethral opening appears nonerythematous, without blood  EXTREMITIES: no edema, cyanosis, or calf tenderness  NERVOUS SYSTEM: answers questions and follows commands appropriately    LABS:                        9.5    13.65 )-----------( 269      ( 01 Jul 2020 06:16 )             30.2     CBC Full  -  ( 01 Jul 2020 06:16 )  WBC Count : 13.65 K/uL  Hemoglobin : 9.5 g/dL  Hematocrit : 30.2 %  Platelet Count - Automated : 269 K/uL  Mean Cell Volume : 87.0 fl  Mean Cell Hemoglobin : 27.4 pg  Mean Cell Hemoglobin Concentration : 31.5 gm/dL  Auto Neutrophil # : x  Auto Lymphocyte # : x  Auto Monocyte # : x  Auto Eosinophil # : x  Auto Basophil # : x  Auto Neutrophil % : x  Auto Lymphocyte % : x  Auto Monocyte % : x  Auto Eosinophil % : x  Auto Basophil % : x    01 Jul 2020 06:16    142    |  109    |  9      ----------------------------<  88     3.7     |  27     |  0.74     Ca    8.3        01 Jul 2020 06:16          CAPILLARY BLOOD GLUCOSE            Culture - Urine (collected 06-28-20 @ 21:16)  Source: .Urine Catheterized  Final Report (06-29-20 @ 16:33):    No growth        RADIOLOGY & ADDITIONAL TESTS:  No new imaging.     Personally reviewed.     Consultant(s) Notes Reviewed:  [x] YES  [ ] NO

## 2020-07-01 NOTE — PROGRESS NOTE ADULT - SUBJECTIVE AND OBJECTIVE BOX
Patient is a 80y old  Male who presents with a chief complaint of  GI bleeding    HPI: Hypotonic Bladder, HTN, HLD, Prostate Cancer presents with GI bleeding and fatigue/weakness.  Patient has been having ongoing issues with GI bleeding and was scheduled to have oupt endoscopy, however was delayed due to covid 19.  He presents with Fatigue.  He chronically straight caths himself due to urinary retention for 12 years and has followed with urology.  He states he has been straight cathing with normal volume, but has been having more frequency.  He denies dysuria/SOB/CP/Dizziness/N/V.  Saw Dr. Carrasco-nephrologist sometime ago.      Had 2 episode of diarrhea.  Straight cath adequate amount.     Follow up Acute on CKD Stage 2  No new complaints    PAST MEDICAL & SURGICAL HISTORY:  Prostate CA  Hyperlipidemia  Hypertension  CAD (coronary artery disease)  Acute MI  No significant past surgical history       FAMILY HISTORY:  [place]    Social History:Non smoker    MEDICATIONS  (STANDING):  pantoprazole  Injectable 40 milliGRAM(s) IV Push Once  pantoprazole  Injectable 40 milliGRAM(s) IV Push two times a day    MEDICATIONS  (PRN):   Meds reviewed    Allergies    clindamycin (Diarrhea)  penicillins (Hives)  sulfa drugs (Rash)    Intolerances         REVIEW OF SYSTEMS:    CONSTITUTIONAL:  neg  EYES: No eye pain, visual disturbances, or discharge  ENMT:  No difficulty hearing, tinnitus, vertigo; No sinus or throat pain  NECK: No pain or stiffness  BREASTS: No pain, masses, or nipple discharge  RESPIRATORY: No SOB. No wheeze. No GEORGES  CARDIOVASCULAR: No chest pain, palpitations, dizziness,   GASTROINTESTINAL: No abdominal or epigastric pain. No nausea, vomiting, or hematemesis; No diarrhea or constipation.   GENITOURINARY: No dysuria, frequency, hematuria, or incontinence  NEUROLOGICAL: No headaches, memory loss, loss of strength, numbness, or tremors  SKIN: no Diffuse erythema, no blisters  LYMPH NODES: No enlarged glands  ENDOCRINE: No heat or cold intolerance; No hair loss  MUSCULOSKELETAL: No joint pain or swelling   PSYCHIATRIC: No depression, anxiety, mood swings, or difficulty sleeping  HEME/LYMPH: No easy bruising, or bleeding gums  ALLERGY AND IMMUNOLOGIC: No hives or eczema      ICU Vital Signs Last 24 Hrs  T(C): 36.6 (26 Jun 2020 13:30), Max: 37.8 (26 Jun 2020 02:27)  T(F): 97.9 (26 Jun 2020 13:30), Max: 100.1 (26 Jun 2020 02:27)  HR: 67 (26 Jun 2020 13:30) (61 - 82)  BP: 127/68 (26 Jun 2020 13:30) (119/66 - 151/63)  BP(mean): --  ABP: --  ABP(mean): --  RR: 17 (26 Jun 2020 13:30) (16 - 20)  SpO2: 98% (26 Jun 2020 13:30) (94% - 100%)      PHYSICAL EXAM:    GENERAL: No acute distress  HEAD:  Atraumatic, Normocephalic  EYES: Conjunctiva and sclera clear  ENMT: No tonsillar erythema, exudates, or enlargement; Moist mucous membranes, Good dentition, No lesions  NECK: Supple, neck  veins full  NERVOUS SYSTEM:  Alert & Oriented X3, Good concentration; Motor Strength wnl upper and lower extremities  CHEST/LUNG: Clear to percussion bilaterally; No rales, rhonchi, wheezing, or rubs  HEART: Regular rate and rhythm; No murmurs, rubs, or gallops  ABDOMEN: Soft, Nontender, Nondistended; Bowel sounds present,  EXTREMITIES:  No Edema  SKIN: No rashes or lesions, pale      LABS:                        9.5    13.65 )-----------( 269      ( 01 Jul 2020 06:16 )             30.2     07-01    142  |  109<H>  |  9   ----------------------------<  88  3.7   |  27  |  0.74    Ca    8.3<L>      01 Jul 2020 06:16    TPro  5.3<L>  /  Alb  2.3<L>  /  TBili  0.5  /  DBili  x   /  AST  64<H>  /  ALT  53  /  AlkPhos  147<H>  06-30

## 2020-07-01 NOTE — PROGRESS NOTE ADULT - PROBLEM SELECTOR PROBLEM 2
Prostate CA
Anemia due to blood loss
Anemia due to blood loss
Preop examination

## 2020-07-01 NOTE — PROGRESS NOTE ADULT - SUBJECTIVE AND OBJECTIVE BOX
Date/Time Patient Seen:  		  Referring MD:   Data Reviewed	       Patient is a 80y old  Male who presents with a chief complaint of GI bleed (30 Jun 2020 14:54)      Subjective/HPI     PAST MEDICAL & SURGICAL HISTORY:  Asthma  Melanoma in situ  Hypothyroid  H/O Clostridium difficile infection  Urinary retention  Plaque psoriasis  History of urinary self-catheterization  Arthritis  Prostate CA: seed therapy aug 2009  Hyperlipidemia  Hypertension  CAD (coronary artery disease)  Acute MI  S/P tonsillectomy  H/O hernia repair  S/P cardiac cath  No significant past surgical history        Medication list         MEDICATIONS  (STANDING):  ferrous    sulfate 325 milliGRAM(s) Oral daily  finasteride 5 milliGRAM(s) Oral daily  fluticasone propionate 50 MICROgram(s)/spray Nasal Spray 2 Spray(s) Both Nostrils <User Schedule>  hydrochlorothiazide 12.5 milliGRAM(s) Oral daily  lactase Tablet 1 Tablet(s) Oral three times a day with meals  levothyroxine 25 MICROGram(s) Oral daily  loratadine 10 milliGRAM(s) Oral daily  metoprolol succinate  milliGRAM(s) Oral daily  montelukast 10 milliGRAM(s) Oral at bedtime  pantoprazole  Injectable 40 milliGRAM(s) IV Push two times a day  phenazopyridine 100 milliGRAM(s) Oral every 8 hours  predniSONE   Tablet 1 milliGRAM(s) Oral four times a day  simvastatin 40 milliGRAM(s) Oral at bedtime  tamsulosin 0.4 milliGRAM(s) Oral at bedtime    MEDICATIONS  (PRN):         Vitals log        ICU Vital Signs Last 24 Hrs  T(C): 36.6 (01 Jul 2020 05:10), Max: 36.9 (30 Jun 2020 21:29)  T(F): 97.8 (01 Jul 2020 05:10), Max: 98.5 (30 Jun 2020 21:29)  HR: 68 (01 Jul 2020 05:10) (62 - 71)  BP: 127/69 (01 Jul 2020 05:10) (127/69 - 132/64)  BP(mean): --  ABP: --  ABP(mean): --  RR: 18 (01 Jul 2020 05:10) (17 - 18)  SpO2: 93% (01 Jul 2020 05:10) (93% - 98%)           Input and Output:  I&O's Detail    29 Jun 2020 07:01 - 30 Jun 2020 07:00  --------------------------------------------------------  IN:  Total IN: 0 mL    OUT:    Ureteral Catheter: 2250 mL  Total OUT: 2250 mL    Total NET: -2250 mL      30 Jun 2020 07:01  -  01 Jul 2020 06:23  --------------------------------------------------------  IN:  Total IN: 0 mL    OUT:    Ureteral Catheter: 1950 mL  Total OUT: 1950 mL    Total NET: -1950 mL          Lab Data                        9.5    13.65 )-----------( 269      ( 01 Jul 2020 06:16 )             30.2     06-30    142  |  110<H>  |  8   ----------------------------<  82  3.3<L>   |  27  |  0.78    Ca    8.6      30 Jun 2020 07:41    TPro  5.3<L>  /  Alb  2.3<L>  /  TBili  0.5  /  DBili  x   /  AST  64<H>  /  ALT  53  /  AlkPhos  147<H>  06-30            Review of Systems	      Objective     Physical Examination    heart s1s2  lung dec BS  abd soft  head nc  head at  on room air  verbal  alert      Pertinent Lab findings & Imaging      Ana:  NO   Adequate UO     I&O's Detail    29 Jun 2020 07:01  -  30 Jun 2020 07:00  --------------------------------------------------------  IN:  Total IN: 0 mL    OUT:    Ureteral Catheter: 2250 mL  Total OUT: 2250 mL    Total NET: -2250 mL      30 Jun 2020 07:01  -  01 Jul 2020 06:23  --------------------------------------------------------  IN:  Total IN: 0 mL    OUT:    Ureteral Catheter: 1950 mL  Total OUT: 1950 mL    Total NET: -1950 mL               Discussed with:     Cultures:	        Radiology

## 2020-07-01 NOTE — PROGRESS NOTE ADULT - PROBLEM SELECTOR PLAN 2
pt is considered a intermediate risk candidate for an intermediate risk surgical  procedure, pt is medically optimized at this time as long as clinically stable and hd stable   -optimized from cardio standpoint as well

## 2020-07-01 NOTE — PROGRESS NOTE ADULT - ASSESSMENT
81 y/o man known to our office followed for iron deficiency anemia, admitted for sig precipitous drop of H/H to Hgb 7+  Hx of known lower GI bleed ?radiation proctitis  Post tx 2U PRBC since admission, w appropriate H/H response and continued increase    -colonoscopy w large fungating near obstructing sigmoid colon mass. 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  -seen by surgery, for palliative colon resection   -discussed w pt, who is aware of significance of findings.    RECOMMEND:   Planned palliative resection for near-obstructing colon mass  Pre-surgery optimization per renal, medicine, pulm, cardiology  Hgb 9.5. Monitor CBC.   Transfusion as needed, if symptomatic, if increased bleeding.   DVT prophylaxis  Followup path post surgery

## 2020-07-01 NOTE — PROGRESS NOTE ADULT - ASSESSMENT
All as above in PA notation.  Patient personally seen and examined.  Vitals non-suggestive.  Abdomen benign.  Labs reassuring.  Biopsy at 30 cm consistent with adenocarcinoma.  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  Not ideal for stenting as palliation given jaylin bleeding per his report and documented anemia.  Risks, benefits, nature of surgery reviewed in detail, including but not limited to:  -General anesthesia with intubation  -Maintenance of Perez catheter and possible post-operative NG or OG  -Plan for laparoscopy, possible laparotomy  -Plan for resection with diversion, possible simple diversion if technically not feasible or in the setting of diffuse disease/ high volume carcinomatosis  -Plan is for anticipated colostomy or "stoma" which may be reversible, but is likely to be lifelong  -Possible post-operative intubation or ICU care  -Discussion of infection, abscess, adhesions, wound issues, enteric/ vascular/ ureteral injury  -Dr. Wagner and his wife are aware that this is a major operative intervention with risk of cardiopulmonary complication or death  -They understand that this is a palliative operation meant to avoid obstruction, ideally stop further bleeding and hopefully facilitate adjuvant therapy if that is their ultimate wish if appropriate  -They understand that this is not a curative procedure.  Despite all of the above, Kristy Odell demonstrates good insight, asks appropriate questions, remains hopeful and is eager to proceed.  For OR...

## 2020-07-01 NOTE — PROGRESS NOTE ADULT - PROBLEM SELECTOR PLAN 4
-AMRY on CKD2 likely 2/2 prerenal in setting of GI bleed  -s/p IVF resolved, continue to monitor  -HCTZ resumed per nephrology  -nephro consulted Salvatore Linda recs appreciated
-MARY on CKD2 likely 2/2 prerenal in setting of GI bleed  s/p IVF resolved, continue to monitor  -hold HCTZ, ACEI for now  -nephro consulted Salvatore Linda recs appreciated
-MARY on CKD2 likely 2/2 prerenal in setting of GI bleed  s/p IVF resolved, continue to monitor  -hold HCTZ, ACEI for now  -nephro consulted Salvatore Linda recs appreciated
-self catheterizes 5-6 times a day, will continue  -continue home flomax .4mg at bedtime   -continue nitrofurantoin 50 mg capsule at bedtime at home, pharmacy unable to break the 100 mg capsules at home, pt's wife can bring in pt's home med
-self catheterizes 5-6 times a day, will continue  -continue home flomax .4mg at bedtime   -continue nitrofurantoin 50 mg capsule at bedtime at home, pharmacy unable to break the 100 mg capsules at home, pt's wife can bring in pt's home med
-MARY on CKD2 likely 2/2 prerenal in setting of GI bleed  s/p IVF resolved, continue to monitor  -hold HCTZ, ACEI for now  -nephro consulted Salvatore Linda recs appreciated

## 2020-07-01 NOTE — PROGRESS NOTE ADULT - PROBLEM SELECTOR PLAN 6
-chronic  -continue home dose toprol XL with hold parameters  - hold ace/HCTZ in setting of MARY
-chronic  -continue home dose toprol XL with hold parameters  - hold ace/HCTZ in setting of MARY
-s/p seed radiation 2009  -continue finasteride home dose

## 2020-07-01 NOTE — PROGRESS NOTE ADULT - PROBLEM SELECTOR PLAN 5
-  -self catheterizes 5-6 times a day, will obtain urine culture and insert paez catheter due to difficulty catherizing  -apprec uro recs Dr Duffy who will see in AM  -continue home flomax .4mg at bedtime   -will start phenozopyridine for urethral discomfort   -repeat h.h stable  -continue nitrofurantoin 50 mg capsule at bedtime at home, pharmacy unable to break the 100 mg capsules at home, pt's wife can bring in pt's home med
-  -self catheterizes 5-6 times a day, will obtain urine culture and insert paez catheter due to difficulty catherizing  -apprec uro recs Dr Duffy who will see in AM  -continue home flomax .4mg at bedtime   -will start phenozopyridine for urethral discomfort   -repeat h.h stable  -continue nitrofurantoin 50 mg capsule at bedtime at home, pharmacy unable to break the 100 mg capsules at home, pt's wife can bring in pt's home med
-acute on chronic, maintain paez while hospitalized  -self catheterizes 5-6 times a day, will obtain urine culture and insert paez catheter due to difficulty catherizing  -Urine culture no growth, final  -Uro (Dr. Duffy) recommendations noted  -continue home flomax .4mg at bedtime   -Continue phenozopyridine for urethral discomfort   -H.H stable  -continue nitrofurantoin 50 mg capsule at bedtime at home, pharmacy unable to break the 100 mg capsules at home, pt's wife can bring in pt's home med
-s/p seed radiation 2009  -continue finasteride home dose
-s/p seed radiation 2009  -continue finasteride home dose
-acute on chronic, maintain paez while hospitalized  -self catheterizes 5-6 times a day, will obtain urine culture and insert paez catheter due to difficulty catherizing  -apprec uro recs Dr Duffy   -continue home flomax .4mg at bedtime   -will start phenozopyridine for urethral discomfort   -repeat h.h stable  -continue nitrofurantoin 50 mg capsule at bedtime at home, pharmacy unable to break the 100 mg capsules at home, pt's wife can bring in pt's home med

## 2020-07-01 NOTE — PROGRESS NOTE ADULT - SUBJECTIVE AND OBJECTIVE BOX
Patient was seen lying comfortably in bed. No acute events overnight.  Pt admits to mild pain but states it is tolerable with meds  Is tolerating diet well without any nausea or vomiting.  Ambulating well. Voiding. Pt has passed flatus and had BMs.   Pt is denying any chest pain, sob, dizziness, weakness       T(C): 36.6 (07-01-20 @ 05:10), Max: 36.9 (06-30-20 @ 21:29)  HR: 68 (07-01-20 @ 05:10) (62 - 71)  BP: 127/69 (07-01-20 @ 05:10) (127/69 - 132/64)  RR: 18 (07-01-20 @ 05:10) (17 - 18)  SpO2: 93% (07-01-20 @ 05:10) (93% - 98%)      06-30-20 @ 07:01  -  07-01-20 @ 07:00  --------------------------------------------------------  IN: 0 mL / OUT: 1950 mL / NET: -1950 mL      PE  General: Pt is lying in bed, NAD, A+O x 3  Cardio: RRR  Lungs: NLB  Abdomen: soft, non distended, and mild suprapubic tenderness   Ext: Calves are soft and non tender to palpation b/l.  ( - ) edema    ASSESSMENT  80M w/ near obstructing sigmoid mass seen on imaging, scheduled for possible OR palliative diverting colostomy thursday 7/2/2020 with Dr. Ross     PLAN  Antibiotics- will order pre operative prep   Respiration- IS use encouraged   Ambulation- OOB as tolerated  Diet- CLD   Pain meds PRN  f/u AM labs   Consult notes appreciated   Will discuss operative plan with attending  Type and screen, pre op labs  dvt ppx, gi ppx   Care per medicine Patient was seen lying comfortably in bed. No acute events overnight.  Pt admits to mild pain but states it is tolerable with meds  Is tolerating diet well without any nausea or vomiting.  Ambulating well. Voiding. Pt has passed flatus and had BMs.   Pt is denying any chest pain, sob, dizziness, weakness       T(C): 36.6 (07-01-20 @ 05:10), Max: 36.9 (06-30-20 @ 21:29)  HR: 68 (07-01-20 @ 05:10) (62 - 71)  BP: 127/69 (07-01-20 @ 05:10) (127/69 - 132/64)  RR: 18 (07-01-20 @ 05:10) (17 - 18)  SpO2: 93% (07-01-20 @ 05:10) (93% - 98%)      06-30-20 @ 07:01  -  07-01-20 @ 07:00  --------------------------------------------------------  IN: 0 mL / OUT: 1950 mL / NET: -1950 mL      PE  General: Pt is lying in bed, NAD, A+O x 3  Cardio: RRR  Lungs: NLB  Abdomen: soft, non distended, and mild suprapubic tenderness   Ext: Calves are soft and non tender to palpation b/l.  ( - ) edema      ASSESSMENT  80M w/ near obstructing sigmoid mass seen on imaging, scheduled for possible OR palliative diverting colostomy thursday 7/2/2020 with Dr. Ross     PLAN  Antibiotics- will order pre operative prep   Respiration- IS use encouraged   Ambulation- OOB as tolerated  Diet- CLD   Pain meds PRN  f/u AM labs   Consult notes appreciated   Will discuss operative plan with attending  Type and screen, pre op labs  dvt ppx, gi ppx   Care per medicine

## 2020-07-01 NOTE — PROGRESS NOTE ADULT - PROBLEM SELECTOR PROBLEM 7
CAD (coronary artery disease)
CAD (coronary artery disease)
Hypertension

## 2020-07-01 NOTE — PROGRESS NOTE ADULT - PROBLEM SELECTOR PLAN 8
-chronic  -continue home dose simvastatin
-chronic  -continue home dose simvastatin
CAD with Hx of MI in 2000   -continue home dose vasotec  - keep hb>8

## 2020-07-01 NOTE — PROGRESS NOTE ADULT - ASSESSMENT
79y/o M PMH of CAD, HTN, HLD, prostate cancer (seed radiation 2009), MI (nov 2000, angioplasty w 3 stents to the right coronary artery), rheumatoid and psoriatic arthritis, plaque psoriasis, b/l hip replacements (left 1998, right 2002), C diff in 2014, urinary retention with self catheretization hypothyroidism, mild asthma, melanoma in situ presents for GIB. Pt states he noticed right red blood in his stools for 6 months. Pt has been feeling weakness, weight loss of 19 pounds since June 2019. Pt sent in by outpatient hematologist Dr. Bergeron given low Hgb outpatient. (6.8 on 6/24) Pt denies SOB or GEORGES, however feels weak when pushing heavy objects. Pt was scheduled with his GI doctor Dr. Mullen for outpatient colonoscopy in March 2020. However, colonoscopy canceled due to COVID19. Pt self catheterizes 5-6 times a day.    Sigmoid mass   - Colonoscopy revealed large fungating near obstructing sigmoid colon mass.   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 Awaiting palliative colon resection  - Optimized for the OR from a cardiac point of view with no evidence of active ischemic heart disease, decompensated heart failure, severe obstructive valvular disease, or uncontrolled arrhythmia.  - BP well controlled, monitor routine hemodynamic   - Continue Perez   - Manage per surgery/Heme/Primary/GI    CAD S/P stent  - EKG is unchanged when compared to prior  - Pt w/o anginal complaints  - There is no evidence of significant arrhythmia.  - There is no evidence for meaningful  volume overload.  - TTE 12/1/18 mild AS, LVD,45 %, no need to repeat  - Continue Toprol  mg PO daily  - Continue Statin     HTN  - BP: 127/69 (07-01-20 @ 05:10) (127/69 - 132/64)  - Continue BB at home dose  - Continue HCTZ  - Monitor routine hemodynamics.     HLD  - Continue statin    Anemia  - S/P PRBCs  - Trend CBC  - Would transfuse to keep Hb>8 and watch volume status     MARY  - Creatinine improved   - resolved     - Monitor and replete lytes, keep K>4, Mg>2.  - All other medical needs as per primary team.  - Other cardiovascular workup will depend on clinical course.  - Will continue to follow.    Rubin Justice DNP, ANP-c  Cardiology   Spectra #[id number]  [phone]

## 2020-07-01 NOTE — PROGRESS NOTE ADULT - SUBJECTIVE AND OBJECTIVE BOX
[facility] -- Noe Land, Jamel Sung Pannella, Patel, Savella  Office # [phone]      Follow Up:    Gi bleed, CAD    Subjective/Observations:   No events overnight resting comfortably in bed.  No complaints of chest pain, dyspnea, or palpitations reported. No signs of orthopnea or PND. C/O diarrhea     REVIEW OF SYSTEMS: All other review of systems is negative unless indicated above    PAST MEDICAL & SURGICAL HISTORY:  Asthma  Melanoma in situ  Hypothyroid  H/O Clostridium difficile infection  Urinary retention  Plaque psoriasis  History of urinary self-catheterization  Arthritis  Prostate CA: seed therapy aug 2009  Hyperlipidemia  Hypertension  CAD (coronary artery disease)  Acute MI  S/P tonsillectomy  H/O hernia repair  S/P cardiac cath      MEDICATIONS  (STANDING):  ferrous    sulfate 325 milliGRAM(s) Oral daily  finasteride 5 milliGRAM(s) Oral daily  fluticasone propionate 50 MICROgram(s)/spray Nasal Spray 2 Spray(s) Both Nostrils <User Schedule>  hydrochlorothiazide 12.5 milliGRAM(s) Oral daily  lactase Tablet 1 Tablet(s) Oral three times a day with meals  levothyroxine 25 MICROGram(s) Oral daily  loratadine 10 milliGRAM(s) Oral daily  metoprolol succinate  milliGRAM(s) Oral daily  montelukast 10 milliGRAM(s) Oral at bedtime  pantoprazole  Injectable 40 milliGRAM(s) IV Push two times a day  phenazopyridine 100 milliGRAM(s) Oral every 8 hours  predniSONE   Tablet 1 milliGRAM(s) Oral four times a day  simvastatin 40 milliGRAM(s) Oral at bedtime  tamsulosin 0.4 milliGRAM(s) Oral at bedtime    MEDICATIONS  (PRN):      Allergies    cephalexin (Urticaria)  clindamycin (Diarrhea)  erythromycin (Other)  Methotrexate Sodium (Unknown)  penicillins (Hives)  sporalin (oxypsoralin/PUVA) (Other)  sulfa drugs (Rash)    Intolerances        Vital Signs Last 24 Hrs  T(C): 36.6 (01 Jul 2020 05:10), Max: 36.9 (30 Jun 2020 21:29)  T(F): 97.8 (01 Jul 2020 05:10), Max: 98.5 (30 Jun 2020 21:29)  HR: 68 (01 Jul 2020 05:10) (62 - 71)  BP: 127/69 (01 Jul 2020 05:10) (127/69 - 132/64)  BP(mean): --  RR: 18 (01 Jul 2020 05:10) (17 - 18)  SpO2: 93% (01 Jul 2020 05:10) (93% - 98%)    I&O's Summary    30 Jun 2020 07:01  -  01 Jul 2020 07:00  --------------------------------------------------------  IN: 0 mL / OUT: 1950 mL / NET: -1950 mL          PHYSICAL EXAM:  TELE: SB  Constitutional: NAD, awake and alert, Frail   HEENT: Moist Mucous Membranes, Anicteric  Pulmonary: Non-labored, breath sounds are clear bilaterally, No wheezing, crackles or rhonchi  Cardiovascular: Regular, S1 and S2 nl, No murmurs, rubs, gallops or clicks  Gastrointestinal: Bowel Sounds present, soft, nontender.   Lymph: No lymphadenopathy. No peripheral edema.  Skin: No visible rashes or ulcers.  Psych:  Mood & affect appropriate    LABS: All Labs Reviewed:                        9.5    13.65 )-----------( 269      ( 01 Jul 2020 06:16 )             30.2                         10.2   14.29 )-----------( 299      ( 30 Jun 2020 07:41 )             32.6                         9.7    12.91 )-----------( 285      ( 29 Jun 2020 06:24 )             30.5     01 Jul 2020 06:16    142    |  109    |  9      ----------------------------<  88     3.7     |  27     |  0.74   30 Jun 2020 07:41    142    |  110    |  8      ----------------------------<  82     3.3     |  27     |  0.78   29 Jun 2020 13:49    143    |  112    |  10     ----------------------------<  80     3.5     |  25     |  0.71     Ca    8.3        01 Jul 2020 06:16  Ca    8.6        30 Jun 2020 07:41  Ca    8.4        29 Jun 2020 13:49    TPro  5.3    /  Alb  2.3    /  TBili  0.5    /  DBili  x      /  AST  64     /  ALT  53     /  AlkPhos  147    30 Jun 2020 07:41             ECG:  < from: 12 Lead ECG (06.25.20 @ 12:09) >  Ventricular Rate 69 BPM    Atrial Rate 69 BPM    P-R Interval 134 ms    QRS Duration 90 ms    Q-T Interval 440 ms    QTC Calculation(Bezet) 471 ms    P Axis 61 degrees    R Axis -31 degrees    T Axis 133 degrees    Diagnosis Line Normal sinus rhythm  Left axis deviation  Left ventricular hypertrophy with repolarization abnormality  Inferior infarct (cited on or before 29-MAR-2010)  Nonspecific ST abnormality  Abnormal ECG  Confirmed by francois Kaplan ([id number]) on 6/25/2020 2:14:26 PM    < end of copied text >    Echo:    Radiology:  < from: CT Chest w/ Oral Cont and w/ IV Cont (06.29.20 @ 16:59) >  XAM:  CT ABDOMEN AND PELVIS OC IC                          EXAM:  CT CHEST OC IC                            PROCEDURE DATE:  06/29/2020          INTERPRETATION:  CLINICAL INFORMATION: Anemia. Obstructing sigmoid mass on colonoscopy.  Gastric polyps.    COMPARISON: None available.    PROCEDURE:   CT of the Chest, Abdomen and Pelvis was performed with intravenous contrast.   Intravenous contrast: 90 ml Omnipaque 350. 10 ml discarded.  Oral contrast: None.  Sagittal and coronal reformats were performed.    FINDINGS:     CHEST:    LUNGS AND LARGE AIRWAYS: PLEURA:   There are small bilateral pleural effusions with underlying compressive atelectasis.  The central airways remain patent.    VESSELS: Atherosclerotic calcification of thoracic aorta and coronary arteries.    HEART: Heart size is normal. No pericardial effusion.    MEDIASTINUM AND SOURAV: No lymphadenopathy.    CHEST WALL AND LOWER NECK: Within normal limits.    ABDOMEN AND PELVIS:    LIVER: There is a large conglomerate heterogeneous mass replacing the majority of the right hepatic lobe liver parenchyma. There is nodular contour of the liver and several low density lesions throughout the left hepatic lobe. Findings are suggestive of diffuse metastatic disease.  BILE DUCTS: Normal caliber.  GALLBLADDER: 6 mm polypoid density projecting within the lumen of the gallbladder could reflect polyp or gallstone. No gallbladder wall thickening.  SPLEEN: Within normal limits.  Probable accessory splenules.  PANCREAS: Within normal limits.  ADRENALS: Within normal limits.  KIDNEYS/URETERS: Large cyst upper pole left kidney. Smaller indeterminate hypodense bilateral renal lesions.    Bilateral extrarenal pelvises.    Evaluation of the pelvis is limited due to extensive metallic streak artifact related to patient's bilateral hip arthroplasties.  BLADDER: Limited evaluation.  Possible left-sided bladder diverticulum.  REPRODUCTIVE ORGANS: Limited evaluation. Radiation seed implants are present.    BOWEL: Evaluation of the rectosigmoid colon limited due to metallic streak artifact.  Sigmoid diverticulosis with segmental bowel wall thickening.  The descending colon is underdistended.  No bowel obstruction noted.    PERITONEUM: There is small to moderate free fluid within the pelvis.  There is mild nodularity along the peritoneal surface, example, image 77, series 2.    VESSELS: Atherosclerotic calcification.  RETROPERITONEUM/LYMPH NODES: No enlarged retroperitoneal lymphadenopathy.      ABDOMINAL WALL: Within normal limits.    BONES: Mottled lytic and sclerotic appearance bilateral posterior iliac bones with scattered sclerotic foci within the pelvis; and T6 and T7 vertebral bodies; findings suspicious for osseous metastatic disease.    IMPRESSION:     Small bilateral pleural effusions and underlying compressive atelectasis.    CT findings suggestive of diffuse hepatic metastatic disease.    Small to moderate pelvic ascites.  Mild peritoneal surface nodularity.  Findings may reflect peritoneal carcinomatosis.    Findings suspicious for osseous metastatic disease as discussed.    Recommend further evaluation with PET/CT scan.    Other findings as discussed above.                ALFRED WASHINGTON M.D., ATTENDING RADIOLOGIST  This document has been electronically signed. Jun 29 2020     < end of copied text > [facility] -- Noe Land, Jamel Sung Pannella, Patel, Savella  Office # [phone]      Follow Up:    Gi bleed, CAD    Subjective/Observations:   No events overnight resting comfortably in bed.  No complaints of chest pain, dyspnea, or palpitations reported. No signs of orthopnea or PND. C/O diarrhea     REVIEW OF SYSTEMS: All other review of systems is negative unless indicated above    PAST MEDICAL & SURGICAL HISTORY:  Asthma  Melanoma in situ  Hypothyroid  H/O Clostridium difficile infection  Urinary retention  Plaque psoriasis  History of urinary self-catheterization  Arthritis  Prostate CA: seed therapy aug 2009  Hyperlipidemia  Hypertension  CAD (coronary artery disease)  Acute MI  S/P tonsillectomy  H/O hernia repair  S/P cardiac cath      MEDICATIONS  (STANDING):  ferrous    sulfate 325 milliGRAM(s) Oral daily  finasteride 5 milliGRAM(s) Oral daily  fluticasone propionate 50 MICROgram(s)/spray Nasal Spray 2 Spray(s) Both Nostrils <User Schedule>  hydrochlorothiazide 12.5 milliGRAM(s) Oral daily  lactase Tablet 1 Tablet(s) Oral three times a day with meals  levothyroxine 25 MICROGram(s) Oral daily  loratadine 10 milliGRAM(s) Oral daily  metoprolol succinate  milliGRAM(s) Oral daily  montelukast 10 milliGRAM(s) Oral at bedtime  pantoprazole  Injectable 40 milliGRAM(s) IV Push two times a day  phenazopyridine 100 milliGRAM(s) Oral every 8 hours  predniSONE   Tablet 1 milliGRAM(s) Oral four times a day  simvastatin 40 milliGRAM(s) Oral at bedtime  tamsulosin 0.4 milliGRAM(s) Oral at bedtime    MEDICATIONS  (PRN):      Allergies    cephalexin (Urticaria)  clindamycin (Diarrhea)  erythromycin (Other)  Methotrexate Sodium (Unknown)  penicillins (Hives)  sporalin (oxypsoralin/PUVA) (Other)  sulfa drugs (Rash)    Intolerances        Vital Signs Last 24 Hrs  T(C): 36.6 (01 Jul 2020 05:10), Max: 36.9 (30 Jun 2020 21:29)  T(F): 97.8 (01 Jul 2020 05:10), Max: 98.5 (30 Jun 2020 21:29)  HR: 68 (01 Jul 2020 05:10) (62 - 71)  BP: 127/69 (01 Jul 2020 05:10) (127/69 - 132/64)  BP(mean): --  RR: 18 (01 Jul 2020 05:10) (17 - 18)  SpO2: 93% (01 Jul 2020 05:10) (93% - 98%)    I&O's Summary    30 Jun 2020 07:01  -  01 Jul 2020 07:00  --------------------------------------------------------  IN: 0 mL / OUT: 1950 mL / NET: -1950 mL          PHYSICAL EXAM:  TELE: SB  Constitutional: NAD, awake and alert, Frail   HEENT: Moist Mucous Membranes, Anicteric  Pulmonary: Decreased breath sounds b/l. No rales, crackles or wheeze appreciated.   Cardiovascular: Regular, S1 and S2 nl, No murmurs, rubs, gallops or clicks  Gastrointestinal: Bowel Sounds present, soft, nontender.   Lymph: No lymphadenopathy. No peripheral edema.  Skin: No visible rashes or ulcers.  Psych:  Mood & affect appropriate    LABS: All Labs Reviewed:                        9.5    13.65 )-----------( 269      ( 01 Jul 2020 06:16 )             30.2                         10.2   14.29 )-----------( 299      ( 30 Jun 2020 07:41 )             32.6                         9.7    12.91 )-----------( 285      ( 29 Jun 2020 06:24 )             30.5     01 Jul 2020 06:16    142    |  109    |  9      ----------------------------<  88     3.7     |  27     |  0.74   30 Jun 2020 07:41    142    |  110    |  8      ----------------------------<  82     3.3     |  27     |  0.78   29 Jun 2020 13:49    143    |  112    |  10     ----------------------------<  80     3.5     |  25     |  0.71     Ca    8.3        01 Jul 2020 06:16  Ca    8.6        30 Jun 2020 07:41  Ca    8.4        29 Jun 2020 13:49    TPro  5.3    /  Alb  2.3    /  TBili  0.5    /  DBili  x      /  AST  64     /  ALT  53     /  AlkPhos  147    30 Jun 2020 07:41             ECG:  < from: 12 Lead ECG (06.25.20 @ 12:09) >  Ventricular Rate 69 BPM    Atrial Rate 69 BPM    P-R Interval 134 ms    QRS Duration 90 ms    Q-T Interval 440 ms    QTC Calculation(Bezet) 471 ms    P Axis 61 degrees    R Axis -31 degrees    T Axis 133 degrees    Diagnosis Line Normal sinus rhythm  Left axis deviation  Left ventricular hypertrophy with repolarization abnormality  Inferior infarct (cited on or before 29-MAR-2010)  Nonspecific ST abnormality  Abnormal ECG  Confirmed by francois Kaplan ([id number]) on 6/25/2020 2:14:26 PM    < end of copied text >    Echo:    Radiology:  < from: CT Chest w/ Oral Cont and w/ IV Cont (06.29.20 @ 16:59) >  XAM:  CT ABDOMEN AND PELVIS OC IC                          EXAM:  CT CHEST OC IC                            PROCEDURE DATE:  06/29/2020          INTERPRETATION:  CLINICAL INFORMATION: Anemia. Obstructing sigmoid mass on colonoscopy.  Gastric polyps.    COMPARISON: None available.    PROCEDURE:   CT of the Chest, Abdomen and Pelvis was performed with intravenous contrast.   Intravenous contrast: 90 ml Omnipaque 350. 10 ml discarded.  Oral contrast: None.  Sagittal and coronal reformats were performed.    FINDINGS:     CHEST:    LUNGS AND LARGE AIRWAYS: PLEURA:   There are small bilateral pleural effusions with underlying compressive atelectasis.  The central airways remain patent.    VESSELS: Atherosclerotic calcification of thoracic aorta and coronary arteries.    HEART: Heart size is normal. No pericardial effusion.    MEDIASTINUM AND SOURAV: No lymphadenopathy.    CHEST WALL AND LOWER NECK: Within normal limits.    ABDOMEN AND PELVIS:    LIVER: There is a large conglomerate heterogeneous mass replacing the majority of the right hepatic lobe liver parenchyma. There is nodular contour of the liver and several low density lesions throughout the left hepatic lobe. Findings are suggestive of diffuse metastatic disease.  BILE DUCTS: Normal caliber.  GALLBLADDER: 6 mm polypoid density projecting within the lumen of the gallbladder could reflect polyp or gallstone. No gallbladder wall thickening.  SPLEEN: Within normal limits.  Probable accessory splenules.  PANCREAS: Within normal limits.  ADRENALS: Within normal limits.  KIDNEYS/URETERS: Large cyst upper pole left kidney. Smaller indeterminate hypodense bilateral renal lesions.    Bilateral extrarenal pelvises.    Evaluation of the pelvis is limited due to extensive metallic streak artifact related to patient's bilateral hip arthroplasties.  BLADDER: Limited evaluation.  Possible left-sided bladder diverticulum.  REPRODUCTIVE ORGANS: Limited evaluation. Radiation seed implants are present.    BOWEL: Evaluation of the rectosigmoid colon limited due to metallic streak artifact.  Sigmoid diverticulosis with segmental bowel wall thickening.  The descending colon is underdistended.  No bowel obstruction noted.    PERITONEUM: There is small to moderate free fluid within the pelvis.  There is mild nodularity along the peritoneal surface, example, image 77, series 2.    VESSELS: Atherosclerotic calcification.  RETROPERITONEUM/LYMPH NODES: No enlarged retroperitoneal lymphadenopathy.      ABDOMINAL WALL: Within normal limits.    BONES: Mottled lytic and sclerotic appearance bilateral posterior iliac bones with scattered sclerotic foci within the pelvis; and T6 and T7 vertebral bodies; findings suspicious for osseous metastatic disease.    IMPRESSION:     Small bilateral pleural effusions and underlying compressive atelectasis.    CT findings suggestive of diffuse hepatic metastatic disease.    Small to moderate pelvic ascites.  Mild peritoneal surface nodularity.  Findings may reflect peritoneal carcinomatosis.    Findings suspicious for osseous metastatic disease as discussed.    Recommend further evaluation with PET/CT scan.    Other findings as discussed above.                ALFRED WASHINGTON M.D., ATTENDING RADIOLOGIST  This document has been electronically signed. Jun 29 2020     < end of copied text >

## 2020-07-01 NOTE — PROGRESS NOTE ADULT - ATTENDING COMMENTS
79 YO M with pmhx of CAD, HTN, HLD, prostate cancer (seed radiation 2009), MI (nov 2000, angioplasty w 3 stents to the right coronary artery), rheumatoid and psoriatic arthritis, plaque psoriasis, b/l hip replacements (left 1998, right 2002), C. diff in 2014, urinary retention with self catheretization hypothyroidism, mild asthma, melanoma in situ, admitted for GIB s/p colonoscopy found to have colonic mass with suggestive metastatic disease PLan: apprec multidisciplinary team,  preop eval as above, monitor clinical course, apprec palliative and PT eval in dc planning as well as hemat/onco recs

## 2020-07-01 NOTE — PROGRESS NOTE ADULT - ASSESSMENT
MARY on CKD 2  GI bleeding  HTN  Anemia  Sigmoid Mass  Hypokalemia    -BLCr 0.9  -MARY likely 2/2 hypoxic/prerenal injury driven by GI bleeding  -UA 30 protein  -Ur lytes reviewed   -Renal indices remain stable at baseline range; electrolytes are stable as well  -PRBCs per primary; Hb currently stable  -BP well controlled; tolerating HCTZ  -Surg eval noted; may benefit from palliative diverting colostomy vs resection    Thank you

## 2020-07-01 NOTE — PROGRESS NOTE ADULT - COVID-19 NEGATIVE LAB RESULT
Procedure(s):  BILATERAL L4/5 LAMINECTOMY/C-ARM.     Anesthesia Post Evaluation      Multimodal analgesia: multimodal analgesia used between 6 hours prior to anesthesia start to PACU discharge  Patient location during evaluation: PACU  Patient participation: complete - patient participated  Level of consciousness: awake  Pain management: satisfactory to patient  Airway patency: patent  Anesthetic complications: no  Cardiovascular status: acceptable  Respiratory status: acceptable  Hydration status: acceptable        Visit Vitals  BP (!) 138/99 (BP 1 Location: Right arm, BP Patient Position: Sitting)   Pulse 89   Temp 36.2 °C (97.1 °F)   Resp 18   Ht 5' 9\" (1.753 m)   Wt 126.1 kg (278 lb)   SpO2 94%   BMI 41.05 kg/m²
COVID-19 ruled out

## 2020-07-01 NOTE — PROGRESS NOTE ADULT - SUBJECTIVE AND OBJECTIVE BOX
[INTERVAL HX: ]  Patient seen and examined;  Chart reviewed and events noted;   pending surgery      Patient is a 80y Male with a known history of :  Gastrointestinal hemorrhage (K92.2) [Active]  Asthma (J45.909) [Active]  Melanoma in situ (D03.9) [Active]  Hypothyroid (E03.9) [Active]  H/O Clostridium difficile infection (Z86.19) [Active]  Urinary retention (R33.9) [Active]  Plaque psoriasis (L40.0) [Active]  History of urinary self-catheterization (Z78.9) [Active]  Arthritis (M19.90) [Active]  Prostate CA (C61) [Active]  Hyperlipidemia (E78.5) [Active]  Hypertension (I10) [Active]  CAD (coronary artery disease) (I25.10) [Active]  Acute MI (I21.3) [Active]  S/P tonsillectomy (Z90.89) [Active]  H/O hernia repair (Z98.890) [Active]  S/P cardiac cath (Z98.890) [Active]  No significant past surgical history ([id number]) [Inactive]    HPI:  81 YO M with pmhx of CAD, HTN, HLD, prostate cancer (seed radiation 2009), MI (nov 2000, angioplasty w 3 stents to the right coronary artery), rheumatoid and psoriatic arthritis, plaque psoriasis, b/l hip replacements (left 1998, right 2002), C diff in 2014, urinary retention with self catheretization hypothyroidism, mild asthma, melanoma in situ presents for GIB. Pt states he noticed right red blood in his stools for 6 months. Pt has been feeling weakness, weight loss of 19 pounds since June 2019. Pt sent in by outpatient hematologist Dr. Bergeron given low Hgb outpatient. (6.8 on 6/24) Pt denies SOB or GEORGES, however feels weak when pushing heavy objects. Pt was scheduled with his GI doctor Dr. Mullen for outpatient colonoscopy in March 2020. However, colonoscopy canceled due to COVID19. Pt self catheterizes 5-6 times a day. Pt admits to burning prior to catheterization when his bladder is full. Pt denies fever, chills, hematemesis, hemoptysis, hematuria, melena, SOB, GEORGES, CP, abdominal pain.     ED vitals significant for temp 98 F, HR 79, /55, RR 16, 93% on room air. Pt's labs significant for +FOBT , wbc 13, Hgb 7.5, hct 25.2, 94% neutrophils, BUN 30, Cr 1.5, AST 95. Pt type and screen performed. CXR showed no acute abnormalities. Pt put on remote tele, made NPO, s/p pantoprazole 40 IV, famotidine 20 IV. 1 unit of pRBCs pending. EKG showed rate 69, NSR, LVH, LAD. (25 Jun 2020 13:46)        MEDICATIONS  (STANDING):  ferrous    sulfate 325 milliGRAM(s) Oral daily  finasteride 5 milliGRAM(s) Oral daily  fluticasone propionate 50 MICROgram(s)/spray Nasal Spray 2 Spray(s) Both Nostrils <User Schedule>  hydrochlorothiazide 12.5 milliGRAM(s) Oral daily  lactase Tablet 1 Tablet(s) Oral three times a day with meals  lactated ringers. 1000 milliLiter(s) (50 mL/Hr) IV Continuous <Continuous>  levothyroxine 25 MICROGram(s) Oral daily  loratadine 10 milliGRAM(s) Oral daily  metoprolol succinate  milliGRAM(s) Oral daily  montelukast 10 milliGRAM(s) Oral at bedtime  pantoprazole  Injectable 40 milliGRAM(s) IV Push two times a day  phenazopyridine 100 milliGRAM(s) Oral every 8 hours  polyethylene glycol/electrolyte Solution 1000 milliLiter(s) Oral once  predniSONE   Tablet 1 milliGRAM(s) Oral four times a day  simvastatin 40 milliGRAM(s) Oral at bedtime  tamsulosin 0.4 milliGRAM(s) Oral at bedtime    MEDICATIONS  (PRN):      Vital Signs Last 24 Hrs  T(C): 36.9 (01 Jul 2020 13:09), Max: 36.9 (30 Jun 2020 21:29)  T(F): 98.5 (01 Jul 2020 13:09), Max: 98.5 (30 Jun 2020 21:29)  HR: 68 (01 Jul 2020 13:30) (66 - 71)  BP: 126/62 (01 Jul 2020 13:30) (126/62 - 136/63)  BP(mean): --  RR: 18 (01 Jul 2020 13:09) (18 - 18)  SpO2: 94% (01 Jul 2020 13:30) (93% - 98%)      [PHYSICAL EXAM]  General: adult in NAD,  WN,  WD.  HEENT: clear oropharynx, anicteric sclera, pink conjunctivae.  Neck: supple, no masses.  CV: normal S1S2, no murmur, no rubs, no gallops.  Lungs: clear to auscultation, no wheezes, no rales, no rhonchi.  Abdomen: soft, non-tender, non-distended, no hepatosplenomegaly, normal BS, no guarding.  Ext: no clubbing, no cyanosis, no edema.  Skin: no rashes,  no petechiae, no venous stasis changes.  Neuro: alert and oriented X3  , no focal motor deficits.  LN: no SC KEVEN.      [LABS:]                        9.5    13.65 )-----------( 269      ( 01 Jul 2020 06:16 )             30.2     07-01    142  |  109<H>  |  9   ----------------------------<  88  3.7   |  27  |  0.74    Ca    8.3<L>      01 Jul 2020 06:16    TPro  5.3<L>  /  Alb  2.3<L>  /  TBili  0.5  /  DBili  x   /  AST  64<H>  /  ALT  53  /  AlkPhos  147<H>  06-30                [RADIOLOGY STUDIES:]

## 2020-07-01 NOTE — PROGRESS NOTE ADULT - PROBLEM SELECTOR PLAN 7
-chronic  -continue home dose toprol XL with hold parameters  - hold ace/HCTZ in setting of MARY
-chronic  -continue home dose toprol XL with hold parameters  - hold ace/HCTZ in setting of MARY
-chronic  -continue home dose toprol XL with hold parameters  -hold ace/HCTZ in setting of MARY
CAD with Hx of MI in 2000   -continue home dose vasotec  - keep hb>8
CAD with Hx of MI in 2000   -continue home dose vasotec  - keep hb>8
-chronic  -continue home dose toprol XL with hold parameters  - hold ace/HCTZ in setting of MARY

## 2020-07-01 NOTE — PROGRESS NOTE ADULT - PROBLEM SELECTOR PROBLEM 4
Anemia
MARY (acute kidney injury)
Urinary retention
Urinary retention
MARY (acute kidney injury)

## 2020-07-02 PROBLEM — R33.9 RETENTION OF URINE, UNSPECIFIED: Chronic | Status: ACTIVE | Noted: 2020-01-01

## 2020-07-02 PROBLEM — Z86.19 PERSONAL HISTORY OF OTHER INFECTIOUS AND PARASITIC DISEASES: Chronic | Status: ACTIVE | Noted: 2020-01-01

## 2020-07-02 PROBLEM — L40.0 PSORIASIS VULGARIS: Chronic | Status: ACTIVE | Noted: 2020-01-01

## 2020-07-02 PROBLEM — E03.9 HYPOTHYROIDISM, UNSPECIFIED: Chronic | Status: ACTIVE | Noted: 2020-01-01

## 2020-07-02 PROBLEM — M19.90 UNSPECIFIED OSTEOARTHRITIS, UNSPECIFIED SITE: Chronic | Status: ACTIVE | Noted: 2020-01-01

## 2020-07-02 PROBLEM — J45.909 UNSPECIFIED ASTHMA, UNCOMPLICATED: Chronic | Status: ACTIVE | Noted: 2020-01-01

## 2020-07-02 PROBLEM — D03.9 MELANOMA IN SITU, UNSPECIFIED: Chronic | Status: ACTIVE | Noted: 2020-01-01

## 2020-07-02 PROBLEM — Z78.9 OTHER SPECIFIED HEALTH STATUS: Chronic | Status: ACTIVE | Noted: 2020-01-01

## 2020-07-02 NOTE — CHART NOTE - NSCHARTNOTEFT_GEN_A_CORE
Assessment: 81 y/o male adm with GI bleed. PMH asthma, hypothyroidism, HLD, CAD, acute MI. Pt NPO after MN for Vira's procedure today. Last BM noted on 7/2.     Factors impacting intake: [ ] none [ ] nausea  [ ] vomiting [ ] diarrhea [ ] constipation  [ ]chewing problems [ ] swallowing issues  [x ] other: NPO for OR     Diet Presciption: Diet, Clear Liquid (06-30-20 @ 23:21)  Diet, NPO after Midnight:      NPO Start Date: 01-Jul-2020,   NPO Start Time: 23:59 (07-01-20 @ 08:34)    Intake: NPO     Current Weight: Weight (kg): 47.2 (07-02 @ 06:49)  % Weight Change    Pertinent Medications: MEDICATIONS  (STANDING):  ferrous    sulfate 325 milliGRAM(s) Oral daily  finasteride 5 milliGRAM(s) Oral daily  fluticasone propionate 50 MICROgram(s)/spray Nasal Spray 2 Spray(s) Both Nostrils <User Schedule>  hydrochlorothiazide 12.5 milliGRAM(s) Oral daily  lactase Tablet 1 Tablet(s) Oral three times a day with meals  lactated ringers 1000 milliLiter(s) (100 mL/Hr) IV Continuous <Continuous>  levothyroxine 25 MICROGram(s) Oral daily  loratadine 10 milliGRAM(s) Oral daily  metoprolol succinate  milliGRAM(s) Oral daily  montelukast 10 milliGRAM(s) Oral at bedtime  pantoprazole  Injectable 40 milliGRAM(s) IV Push two times a day  phenazopyridine 100 milliGRAM(s) Oral every 8 hours  potassium chloride  10 mEq/100 mL IVPB 10 milliEquivalent(s) IV Intermittent every 1 hour  predniSONE   Tablet 1 milliGRAM(s) Oral four times a day  simvastatin 40 milliGRAM(s) Oral at bedtime  tamsulosin 0.4 milliGRAM(s) Oral at bedtime    MEDICATIONS  (PRN):    Pertinent Labs: 07-02 Na145 mmol/L Glu 79 mg/dL K+ 2.7 mmol/L<LL> Cr  0.60 mg/dL BUN 6 mg/dL<L> 06-28 Phos 2.5 mg/dL 07-02 Alb 1.9 g/dL<L>     CAPILLARY BLOOD GLUCOSE        Skin: Sacrum Stage I     Estimated Needs:   [x ] no change since previous assessment  [ ] recalculated:     Previous Nutrition Diagnosis:   [ ] Inadequate Energy Intake [ ]Inadequate Oral Intake [ ] Excessive Energy Intake   [ ] Underweight [ ] Increased Nutrient Needs [ ] Overweight/Obesity   [ ] Altered GI Function [ ] Unintended Weight Loss [ ] Food & Nutrition Related Knowledge Deficit [x ] Malnutrition     Nutrition Diagnosis is [x ] ongoing  [ ] resolved [ ] not applicable     New Nutrition Diagnosis: [ x] not applicable       Interventions:   Recommend  [ ] Change Diet To:  [ x] Nutrition Supplement: consider adding MVI daily   [ ] Nutrition Support  [x ] Other: consider clear liquids adv as tolerated to low fiber after OR. Will monitor and evaluate for po supplements as diet is advanced. Will remain available for diet education when appropriate.     Monitoring and Evaluation:   [ x] PO intake [ x ] Tolerance to diet prescription [ x ] weights [ x ] labs[ x ] follow up per protocol  [ ] other:

## 2020-07-02 NOTE — PROGRESS NOTE ADULT - SUBJECTIVE AND OBJECTIVE BOX
CC/F/U for:     HPI:  79 YO M with pmhx of CAD, HTN, HLD, prostate cancer (seed radiation 2009), MI (nov 2000, angioplasty w 3 stents to the right coronary artery), rheumatoid and psoriatic arthritis, plaque psoriasis, b/l hip replacements (left 1998, right 2002), C diff in 2014, urinary retention with self catheretization hypothyroidism, mild asthma, melanoma in situ presents for GIB. Pt states he noticed right red blood in his stools for 6 months. Pt has been feeling weakness, weight loss of 19 pounds since June 2019. Pt sent in by outpatient hematologist Dr. Bergeron given low Hgb outpatient. (6.8 on 6/24) Pt denies SOB or GEORGES, however feels weak when pushing heavy objects. Pt was scheduled with his GI doctor Dr. Mullen for outpatient colonoscopy in March 2020. However, colonoscopy canceled due to COVID19. Pt self catheterizes 5-6 times a day. Pt admits to burning prior to catheterization when his bladder is full. Pt denies fever, chills, hematemesis, hemoptysis, hematuria, melena, SOB, GEORGES, CP, abdominal pain.     ED vitals significant for temp 98 F, HR 79, /55, RR 16, 93% on room air. Pt's labs significant for +FOBT , wbc 13, Hgb 7.5, hct 25.2, 94% neutrophils, BUN 30, Cr 1.5, AST 95. Pt type and screen performed. CXR showed no acute abnormalities. Pt put on remote tele, made NPO, s/p pantoprazole 40 IV, famotidine 20 IV. 1 unit of pRBCs pending. EKG showed rate 69, NSR, LVH, LAD. (25 Jun 2020 13:46)        INTERVAL HPI/OVERNIGHT EVENTS:  Pt seen and examined at bedside.     Allergies/Intolerance: cephalexin (Urticaria)  clindamycin (Diarrhea)  erythromycin (Other)  Methotrexate Sodium (Unknown)  penicillins (Hives)  sporalin (oxypsoralin/PUVA) (Other)  sulfa drugs (Rash)      MEDICATIONS  (STANDING):  ferrous    sulfate 325 milliGRAM(s) Oral daily  finasteride 5 milliGRAM(s) Oral daily  fluticasone propionate 50 MICROgram(s)/spray Nasal Spray 2 Spray(s) Both Nostrils <User Schedule>  hydrochlorothiazide 12.5 milliGRAM(s) Oral daily  lactase Tablet 1 Tablet(s) Oral three times a day with meals  lactated ringers 1000 milliLiter(s) (100 mL/Hr) IV Continuous <Continuous>  levothyroxine 25 MICROGram(s) Oral daily  loratadine 10 milliGRAM(s) Oral daily  metoprolol succinate  milliGRAM(s) Oral daily  montelukast 10 milliGRAM(s) Oral at bedtime  pantoprazole  Injectable 40 milliGRAM(s) IV Push two times a day  phenazopyridine 100 milliGRAM(s) Oral every 8 hours  predniSONE   Tablet 1 milliGRAM(s) Oral four times a day  simvastatin 40 milliGRAM(s) Oral at bedtime  tamsulosin 0.4 milliGRAM(s) Oral at bedtime    MEDICATIONS  (PRN):        ROS: as above; all other systems reviewed and wnl      PHYSICAL EXAMINATION:  Vital Signs Last 24 Hrs  T(C): 37.1 (02 Jul 2020 11:58), Max: 37.1 (02 Jul 2020 06:49)  T(F): 98.8 (02 Jul 2020 11:58), Max: 98.8 (02 Jul 2020 06:49)  HR: 68 (02 Jul 2020 11:58) (66 - 75)  BP: 154/70 (02 Jul 2020 11:58) (124/69 - 154/70)  BP(mean): --  RR: 16 (02 Jul 2020 11:58) (16 - 18)  SpO2: 99% (02 Jul 2020 11:58) (92% - 99%)  CAPILLARY BLOOD GLUCOSE      POCT Blood Glucose.: 71 mg/dL (02 Jul 2020 11:48)      GENERAL: NAD  HEENT: mucous membranes dry  CHEST: respirations unlabored  HEART: HR s  ABDOMEN: soft, ND, NT   EXTREMITIES:  no edema b/l LEs, no calf tenderness  NEURO: awake, alert, interactive; moves all extremities      LABS:                        9.6    9.84  )-----------( 254      ( 02 Jul 2020 06:42 )             30.4     07-02    x   |  x   |  x   ----------------------------<  x   3.2<L>   |  x   |  x     Ca    8.1<L>      02 Jul 2020 06:42    TPro  4.7<L>  /  Alb  1.9<L>  /  TBili  0.3  /  DBili  x   /  AST  50<H>  /  ALT  54  /  AlkPhos  161<H>  07-02    PT/INR - ( 02 Jul 2020 06:42 )   PT: 14.3 sec;   INR: 1.23 ratio                 RADIOLOGY & ADDITIONAL TESTS:      ASSESSMENT AND PLAN:  80y Male CC/F/U for: GIB, colonic mass    HPI:  81 YO M with pmhx of CAD, HTN, HLD, prostate cancer (seed radiation 2009), MI (nov 2000, angioplasty w 3 stents to the right coronary artery), rheumatoid and psoriatic arthritis, plaque psoriasis, b/l hip replacements (left 1998, right 2002), C diff in 2014, urinary retention with self catheretization hypothyroidism, mild asthma, melanoma in situ presents for GIB. Pt states he noticed right red blood in his stools for 6 months. Pt has been feeling weakness, weight loss of 19 pounds since June 2019. Pt sent in by outpatient hematologist Dr. Bergeron given low Hgb outpatient. (6.8 on 6/24) Pt denies SOB or GEORGES, however feels weak when pushing heavy objects. Pt was scheduled with his GI doctor Dr. Mullen for outpatient colonoscopy in March 2020. However, colonoscopy canceled due to COVID19. Pt self catheterizes 5-6 times a day. Pt admits to burning prior to catheterization when his bladder is full. Pt denies fever, chills, hematemesis, hemoptysis, hematuria, melena, SOB, GEORGES, CP, abdominal pain.     ED vitals significant for temp 98 F, HR 79, /55, RR 16, 93% on room air. Pt's labs significant for +FOBT , wbc 13, Hgb 7.5, hct 25.2, 94% neutrophils, BUN 30, Cr 1.5, AST 95. Pt type and screen performed. CXR showed no acute abnormalities. Pt put on remote tele, made NPO, s/p pantoprazole 40 IV, famotidine 20 IV. 1 unit of pRBCs pending. EKG showed rate 69, NSR, LVH, LAD. (25 Jun 2020 13:46)      INTERVAL HPI/OVERNIGHT EVENTS:  Pt seen and examined at bedside - for Vira procedure today.     Allergies/Intolerance: cephalexin (Urticaria)  clindamycin (Diarrhea)  erythromycin (Other)  Methotrexate Sodium (Unknown)  penicillins (Hives)  sporalin (oxypsoralin/PUVA) (Other)  sulfa drugs (Rash)      MEDICATIONS  (STANDING):  ferrous    sulfate 325 milliGRAM(s) Oral daily  finasteride 5 milliGRAM(s) Oral daily  fluticasone propionate 50 MICROgram(s)/spray Nasal Spray 2 Spray(s) Both Nostrils <User Schedule>  hydrochlorothiazide 12.5 milliGRAM(s) Oral daily  lactase Tablet 1 Tablet(s) Oral three times a day with meals  lactated ringers 1000 milliLiter(s) (100 mL/Hr) IV Continuous <Continuous>  levothyroxine 25 MICROGram(s) Oral daily  loratadine 10 milliGRAM(s) Oral daily  metoprolol succinate  milliGRAM(s) Oral daily  montelukast 10 milliGRAM(s) Oral at bedtime  pantoprazole  Injectable 40 milliGRAM(s) IV Push two times a day  phenazopyridine 100 milliGRAM(s) Oral every 8 hours  predniSONE   Tablet 1 milliGRAM(s) Oral four times a day  simvastatin 40 milliGRAM(s) Oral at bedtime  tamsulosin 0.4 milliGRAM(s) Oral at bedtime    MEDICATIONS  (PRN):      ROS: as above; all other systems reviewed and wnl      PHYSICAL EXAMINATION:  Vital Signs Last 24 Hrs  T(C): 37.1 (02 Jul 2020 11:58), Max: 37.1 (02 Jul 2020 06:49)  T(F): 98.8 (02 Jul 2020 11:58), Max: 98.8 (02 Jul 2020 06:49)  HR: 68 (02 Jul 2020 11:58) (66 - 75)  BP: 154/70 (02 Jul 2020 11:58) (124/69 - 154/70)  RR: 16 (02 Jul 2020 11:58) (16 - 18)  SpO2: 99% (02 Jul 2020 11:58) (92% - 99%)  CAPILLARY BLOOD GLUCOSE    POCT Blood Glucose.: 71 mg/dL (02 Jul 2020 11:48)      GENERAL: elderly male, NAD  HEENT: mucous membranes dry  CHEST: respirations unlabored  HEART: HR 60s  ABDOMEN: soft, ND, NT   : Perez in place  EXTREMITIES: no edema b/l LEs, no calf tenderness  NEURO: awake, alert, interactive; moves all extremities      LABS:                        9.6    9.84  )-----------( 254      ( 02 Jul 2020 06:42 )             30.4     07-02    x   |  x   |  x   ----------------------------<  x   3.2<L>   |  x   |  x     Ca    8.1<L>      02 Jul 2020 06:42    TPro  4.7<L>  /  Alb  1.9<L>  /  TBili  0.3  /  DBili  x   /  AST  50<H>  /  ALT  54  /  AlkPhos  161<H>  07-02    PT/INR - ( 02 Jul 2020 06:42 )   PT: 14.3 sec;   INR: 1.23 ratio

## 2020-07-02 NOTE — BRIEF OPERATIVE NOTE - OPERATION/FINDINGS
Consistent with obstructing colon cancer.
EGD:  Small hiatal hernia, multiple gastric polyps - biopsies obtained.  Normal duodenum s/p bx to r/o villous atrophy.   Colonoscopy:  Large, near obstructing, fungating mass in sigmoid colon at 30 cm from dentate line - colonoscope could not traverse through mass - biopsies obtained of the mass.  Area just distal to the mass was tattooed with CORNEL INK.  At 15 cm from the dentate line, there was a small benign appearing polyp in sigmoid colon that could not be removed secondary to difficult position - biopsy was obtained.  Sigmoid diverticulosis.

## 2020-07-02 NOTE — PROGRESS NOTE ADULT - SUBJECTIVE AND OBJECTIVE BOX
Patient is a 80y old  Male who presents with a chief complaint of  GI bleeding    HPI: Hypotonic Bladder, HTN, HLD, Prostate Cancer presents with GI bleeding and fatigue/weakness.  Patient has been having ongoing issues with GI bleeding and was scheduled to have oupt endoscopy, however was delayed due to covid 19.  He presents with Fatigue.  He chronically straight caths himself due to urinary retention for 12 years and has followed with urology.  He states he has been straight cathing with normal volume, but has been having more frequency.  He denies dysuria/SOB/CP/Dizziness/N/V.  Saw Dr. Carrasco-nephrologist sometime ago.     Follow up Acute on CKD Stage 2  No new complaints    PAST MEDICAL & SURGICAL HISTORY:  Prostate CA  Hyperlipidemia  Hypertension  CAD (coronary artery disease)  Acute MI  No significant past surgical history       FAMILY HISTORY:  NC    Social History:Non smoker    MEDICATIONS  (STANDING):  pantoprazole  Injectable 40 milliGRAM(s) IV Push Once  pantoprazole  Injectable 40 milliGRAM(s) IV Push two times a day    MEDICATIONS  (PRN):   Meds reviewed    Allergies    clindamycin (Diarrhea)  penicillins (Hives)  sulfa drugs (Rash)    Intolerances         REVIEW OF SYSTEMS:    CONSTITUTIONAL:  neg  EYES: No eye pain, visual disturbances, or discharge  ENMT:  No difficulty hearing, tinnitus, vertigo; No sinus or throat pain  NECK: No pain or stiffness  BREASTS: No pain, masses, or nipple discharge  RESPIRATORY: No SOB. No wheeze. No GEORGES  CARDIOVASCULAR: No chest pain, palpitations, dizziness,   GASTROINTESTINAL: No abdominal or epigastric pain. No nausea, vomiting, or hematemesis; No diarrhea or constipation.   GENITOURINARY: No dysuria, frequency, hematuria, or incontinence  NEUROLOGICAL: No headaches, memory loss, loss of strength, numbness, or tremors  SKIN: no Diffuse erythema, no blisters  LYMPH NODES: No enlarged glands  ENDOCRINE: No heat or cold intolerance; No hair loss  MUSCULOSKELETAL: No joint pain or swelling   PSYCHIATRIC: No depression, anxiety, mood swings, or difficulty sleeping  HEME/LYMPH: No easy bruising, or bleeding gums  ALLERGY AND IMMUNOLOGIC: No hives or eczema      ICU Vital Signs Last 24 Hrs  T(C): 37.1 (02 Jul 2020 11:58), Max: 37.1 (02 Jul 2020 06:49)  T(F): 98.8 (02 Jul 2020 11:58), Max: 98.8 (02 Jul 2020 06:49)  HR: 68 (02 Jul 2020 11:58) (68 - 75)  BP: 154/70 (02 Jul 2020 11:58) (124/69 - 154/70)  BP(mean): --  ABP: --  ABP(mean): --  RR: 16 (02 Jul 2020 11:58) (16 - 18)  SpO2: 99% (02 Jul 2020 11:58) (92% - 99%)        PHYSICAL EXAM:    GENERAL: No acute distress  HEAD:  Atraumatic, Normocephalic  EYES: Conjunctiva and sclera clear  ENMT: No tonsillar erythema, exudates, or enlargement; Moist mucous membranes, Good dentition, No lesions  NECK: Supple, neck  veins full  NERVOUS SYSTEM:  Alert & Oriented X3, Good concentration; Motor Strength wnl upper and lower extremities  CHEST/LUNG: Clear to percussion bilaterally; No rales, rhonchi, wheezing, or rubs  HEART: Regular rate and rhythm; No murmurs, rubs, or gallops  ABDOMEN: Soft, Nontender, Nondistended; Bowel sounds present,  EXTREMITIES:  No Edema  SKIN: No rashes or lesions, pale      LABS:                        9.5    13.65 )-----------( 269      ( 01 Jul 2020 06:16 )             30.2     07-01    142  |  109<H>  |  9   ----------------------------<  88  3.7   |  27  |  0.74    Ca    8.3<L>      01 Jul 2020 06:16    TPro  5.3<L>  /  Alb  2.3<L>  /  TBili  0.5  /  DBili  x   /  AST  64<H>  /  ALT  53  /  AlkPhos  147<H>  06-30 Patient is a 80y old  Male who presents with a chief complaint of  GI bleeding    HPI: Hypotonic Bladder, HTN, HLD, Prostate Cancer presents with GI bleeding and fatigue/weakness.  Patient has been having ongoing issues with GI bleeding and was scheduled to have oupt endoscopy, however was delayed due to covid 19.  He presents with Fatigue.  He chronically straight caths himself due to urinary retention for 12 years and has followed with urology.  He states he has been straight cathing with normal volume, but has been having more frequency.  He denies dysuria/SOB/CP/Dizziness/N/V.  Saw Dr. Carrasco-nephrologist sometime ago.     Follow up Acute on CKD Stage 2  No new complaints    PAST MEDICAL & SURGICAL HISTORY:  Prostate CA  Hyperlipidemia  Hypertension  CAD (coronary artery disease)  Acute MI  No significant past surgical history       FAMILY HISTORY:  NC    Social History:Non smoker    MEDICATIONS  (STANDING):  pantoprazole  Injectable 40 milliGRAM(s) IV Push Once  pantoprazole  Injectable 40 milliGRAM(s) IV Push two times a day    MEDICATIONS  (PRN):   Meds reviewed    Allergies    clindamycin (Diarrhea)  penicillins (Hives)  sulfa drugs (Rash)    Intolerances         REVIEW OF SYSTEMS:    CONSTITUTIONAL:  neg  EYES: No eye pain, visual disturbances, or discharge  ENMT:  No difficulty hearing, tinnitus, vertigo; No sinus or throat pain  NECK: No pain or stiffness  BREASTS: No pain, masses, or nipple discharge  RESPIRATORY: No SOB. No wheeze. No GEORGES  CARDIOVASCULAR: No chest pain, palpitations, dizziness,   GASTROINTESTINAL: No abdominal or epigastric pain. No nausea, vomiting, or hematemesis; No diarrhea or constipation.   GENITOURINARY: No dysuria, frequency, hematuria, or incontinence  NEUROLOGICAL: No headaches, memory loss, loss of strength, numbness, or tremors  SKIN: no Diffuse erythema, no blisters  LYMPH NODES: No enlarged glands  ENDOCRINE: No heat or cold intolerance; No hair loss  MUSCULOSKELETAL: No joint pain or swelling   PSYCHIATRIC: No depression, anxiety, mood swings, or difficulty sleeping  HEME/LYMPH: No easy bruising, or bleeding gums  ALLERGY AND IMMUNOLOGIC: No hives or eczema      ICU Vital Signs Last 24 Hrs  T(C): 37.1 (02 Jul 2020 11:58), Max: 37.1 (02 Jul 2020 06:49)  T(F): 98.8 (02 Jul 2020 11:58), Max: 98.8 (02 Jul 2020 06:49)  HR: 68 (02 Jul 2020 11:58) (68 - 75)  BP: 154/70 (02 Jul 2020 11:58) (124/69 - 154/70)  BP(mean): --  ABP: --  ABP(mean): --  RR: 16 (02 Jul 2020 11:58) (16 - 18)  SpO2: 99% (02 Jul 2020 11:58) (92% - 99%)        PHYSICAL EXAM:    GENERAL: No acute distress  HEAD:  Atraumatic, Normocephalic  EYES: Conjunctiva and sclera clear  ENMT: No tonsillar erythema, exudates, or enlargement; Moist mucous membranes, Good dentition, No lesions  NECK: Supple, neck  veins full  NERVOUS SYSTEM:  Alert & Oriented X3, Good concentration; Motor Strength wnl upper and lower extremities  CHEST/LUNG: Clear to percussion bilaterally; No rales, rhonchi, wheezing, or rubs  HEART: Regular rate and rhythm; No murmurs, rubs, or gallops  ABDOMEN: Soft, Nontender, Nondistended; Bowel sounds present,  EXTREMITIES:  No Edema  SKIN: No rashes or lesions, pale      LABS:                        9.6    9.84  )-----------( 254      ( 02 Jul 2020 06:42 )             30.4     07-02    x   |  x   |  x   ----------------------------<  x   3.2<L>   |  x   |  x     Ca    8.1<L>      02 Jul 2020 06:42    TPro  4.7<L>  /  Alb  1.9<L>  /  TBili  0.3  /  DBili  x   /  AST  50<H>  /  ALT  54  /  AlkPhos  161<H>  07-02    PT/INR - ( 02 Jul 2020 06:42 )   PT: 14.3 sec;   INR: 1.23 ratio

## 2020-07-02 NOTE — BRIEF OPERATIVE NOTE - NSICDXBRIEFPROCEDURE_GEN_ALL_CORE_FT
PROCEDURES:  Laparoscopic mobilization of splenic flexure of colon 02-Jul-2020 15:21:40  Adrian Ross  Laparoscopic Vira colectomy 02-Jul-2020 15:21:18  Adrian Ross

## 2020-07-02 NOTE — PROGRESS NOTE ADULT - ASSESSMENT
79y/o M PMH of CAD, HTN, HLD, prostate cancer (seed radiation 2009), MI (nov 2000, angioplasty w 3 stents to the right coronary artery), rheumatoid and psoriatic arthritis, plaque psoriasis, b/l hip replacements (left 1998, right 2002), C diff in 2014, urinary retention with self catheretization hypothyroidism, mild asthma, melanoma in situ presents for GIB. Pt states he noticed right red blood in his stools for 6 months. Pt has been feeling weakness, weight loss of 19 pounds since June 2019. Pt sent in by outpatient hematologist Dr. Bergeron given low Hgb outpatient. (6.8 on 6/24) Pt denies SOB or GEORGES, however feels weak when pushing heavy objects. Pt was scheduled with his GI doctor Dr. Mullen for outpatient colonoscopy in March 2020. However, colonoscopy canceled due to COVID19. Pt self catheterizes 5-6 times a day.    Sigmoid mass   - Colonoscopy revealed large fungating near obstructing sigmoid colon mass.   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 Awaiting palliative colon resection  - Optimized for the OR from a cardiac point of view with no evidence of active ischemic heart disease, decompensated heart failure, severe obstructive valvular disease, or uncontrolled arrhythmia.  - BP well controlled, monitor routine hemodynamic   - Continue Perez   - Manage per surgery/Heme/Primary/GI    CAD S/P stent  - EKG is unchanged when compared to prior  - Pt w/o anginal complaints  - There is no evidence of significant arrhythmia.  - There is no evidence for meaningful  volume overload.  - TTE 12/1/18 mild AS, LVD,45 %, no need to repeat  - Continue Toprol  mg PO daily  - Continue Statin     HTN  -controlled  - Continue BB at home dose  - Continue HCTZ  - Monitor routine hemodynamics.     HLD  - Continue statin    Anemia  - S/P PRBCs  - Trend CBC  - Would transfuse to keep Hb>8 and watch volume status     MARY  - Creatinine improved   - resolved     - Monitor and replete lytes, keep K>4, Mg>2.  - All other medical needs as per primary team.  - Other cardiovascular workup will depend on clinical course.  - Will continue to follow.      Rubin Justice DNP, ANP-c  Cardiology   Spectra #[id number] [phone] 81y/o M PMH of CAD, HTN, HLD, prostate cancer (seed radiation 2009), MI (nov 2000, angioplasty w 3 stents to the right coronary artery), rheumatoid and psoriatic arthritis, plaque psoriasis, b/l hip replacements (left 1998, right 2002), C diff in 2014, urinary retention with self catheretization hypothyroidism, mild asthma, melanoma in situ presents for GIB. Pt states he noticed right red blood in his stools for 6 months. Pt has been feeling weakness, weight loss of 19 pounds since June 2019. Pt sent in by outpatient hematologist Dr. Bergeron given low Hgb outpatient. (6.8 on 6/24) Pt denies SOB or GEORGES, however feels weak when pushing heavy objects. Pt was scheduled with his GI doctor Dr. Mullen for outpatient colonoscopy in March 2020. However, colonoscopy canceled due to COVID19. Pt self catheterizes 5-6 times a day.    Sigmoid mass   - Colonoscopy revealed large fungating near obstructing sigmoid colon mass.   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 Awaiting palliative colon resection  - pending normalization of electrolytes, is optimized for the OR from a cardiac point of view with no evidence of active ischemic heart disease, decompensated heart failure, severe obstructive valvular disease, or uncontrolled arrhythmia.  - BP well controlled, monitor routine hemodynamic   - Continue Perez   - Manage per surgery/Heme/Primary/GI    CAD S/P stent  - EKG is unchanged when compared to prior  - Pt w/o anginal complaints  - There is no evidence of significant arrhythmia.  - There is no evidence for meaningful  volume overload.  - TTE 12/1/18 mild AS, LVD,45 %, no need to repeat  - Continue Toprol  mg PO daily  - Continue Statin     HTN  -controlled  - Continue BB at home dose  - Continue HCTZ  - Monitor routine hemodynamics.     HLD  - Continue statin    Anemia  - S/P PRBCs  - Trend CBC  - Would transfuse to keep Hb>8 and watch volume status     MARY  - Creatinine improved   - resolved     - Monitor and replete lytes, keep K>4, Mg>2.  - All other medical needs as per primary team.  - Other cardiovascular workup will depend on clinical course.  - Will continue to follow.      Rubin Justice DNP, ANP-c  Cardiology   Spectra #[id number] [phone]

## 2020-07-02 NOTE — PROGRESS NOTE ADULT - SUBJECTIVE AND OBJECTIVE BOX
Interval History:  schedule for colon surgery today  discussed previously with Dr. Ross    Chart reviewed and events noted;   Overnight events:    MEDICATIONS  (STANDING):  ferrous    sulfate 325 milliGRAM(s) Oral daily  finasteride 5 milliGRAM(s) Oral daily  fluticasone propionate 50 MICROgram(s)/spray Nasal Spray 2 Spray(s) Both Nostrils <User Schedule>  hydrochlorothiazide 12.5 milliGRAM(s) Oral daily  lactase Tablet 1 Tablet(s) Oral three times a day with meals  lactated ringers 1000 milliLiter(s) (100 mL/Hr) IV Continuous <Continuous>  levothyroxine 25 MICROGram(s) Oral daily  loratadine 10 milliGRAM(s) Oral daily  metoprolol succinate  milliGRAM(s) Oral daily  montelukast 10 milliGRAM(s) Oral at bedtime  pantoprazole  Injectable 40 milliGRAM(s) IV Push two times a day  phenazopyridine 100 milliGRAM(s) Oral every 8 hours  predniSONE   Tablet 1 milliGRAM(s) Oral four times a day  simvastatin 40 milliGRAM(s) Oral at bedtime  tamsulosin 0.4 milliGRAM(s) Oral at bedtime    MEDICATIONS  (PRN):      Vital Signs Last 24 Hrs  T(C): 36.4 (02 Jul 2020 11:10), Max: 36.9 (01 Jul 2020 13:09)  T(F): 97.6 (02 Jul 2020 11:10), Max: 98.5 (01 Jul 2020 13:09)  HR: 70 (02 Jul 2020 11:10) (66 - 75)  BP: 153/67 (02 Jul 2020 11:10) (124/69 - 153/67)  BP(mean): --  RR: 18 (02 Jul 2020 05:40) (17 - 18)  SpO2: 92% (02 Jul 2020 05:40) (92% - 97%)    PHYSICAL EXAM  General: adult in NAD  HEENT: clear oropharynx, anicteric sclera, pink conjunctivae  Neck: supple  CV: normal S1S2 with no murmur rubs or gallops  Lungs: clear to auscultation, no wheezes, no rhales  Abdomen: soft non-tender non-distended, no hepato/splenomegaly  Ext: no clubbing cyanosis or edema  Skin: no rashes and no petichiae  Neuro: alert and oriented X3 no focal deficits      LABS:  CBC Full  -  ( 02 Jul 2020 06:42 )  WBC Count : 9.84 K/uL  RBC Count : 3.49 M/uL  Hemoglobin : 9.6 g/dL  Hematocrit : 30.4 %  Platelet Count - Automated : 254 K/uL  Mean Cell Volume : 87.1 fl  Mean Cell Hemoglobin : 27.5 pg  Mean Cell Hemoglobin Concentration : 31.6 gm/dL  Auto Neutrophil # : 8.00 K/uL  Auto Lymphocyte # : 0.62 K/uL  Auto Monocyte # : 1.03 K/uL  Auto Eosinophil # : 0.09 K/uL  Auto Basophil # : 0.03 K/uL  Auto Neutrophil % : 81.3 %  Auto Lymphocyte % : 6.3 %  Auto Monocyte % : 10.5 %  Auto Eosinophil % : 0.9 %  Auto Basophil % : 0.3 %    07-02    x   |  x   |  x   ----------------------------<  x   3.2<L>   |  x   |  x     Ca    8.1<L>      02 Jul 2020 06:42    TPro  4.7<L>  /  Alb  1.9<L>  /  TBili  0.3  /  DBili  x   /  AST  50<H>  /  ALT  54  /  AlkPhos  161<H>  07-02    PT/INR - ( 02 Jul 2020 06:42 )   PT: 14.3 sec;   INR: 1.23 ratio             fe studies      WBC trend  9.84 K/uL (07-02-20 @ 06:42)  13.65 K/uL (07-01-20 @ 06:16)  14.29 K/uL (06-30-20 @ 07:41)      Hgb trend  9.6 g/dL (07-02-20 @ 06:42)  9.5 g/dL (07-01-20 @ 06:16)  10.2 g/dL (06-30-20 @ 07:41)      plt trend  254 K/uL (07-02-20 @ 06:42)  269 K/uL (07-01-20 @ 06:16)  299 K/uL (06-30-20 @ 07:41)        RADIOLOGY & ADDITIONAL STUDIES:

## 2020-07-02 NOTE — PROGRESS NOTE ADULT - ASSESSMENT
All as above in PA notation.  Patient personally seen and examined.  Prep completed yesterday without issue.  K replaced this am without difficulty.  Operative antibiotic prophylaxis ordered.  Consent personally obtained.  To OR for planned laparoscopy, possible laparotomy with palliative resection and anticipated diversion.

## 2020-07-02 NOTE — BRIEF OPERATIVE NOTE - NSICDXBRIEFPREOP_GEN_ALL_CORE_FT
PRE-OP DIAGNOSIS:  Large bowel obstruction 02-Jul-2020 15:20:28  Adrian Ross  Metastatic colon cancer to liver 02-Jul-2020 15:17:40  Adrian Ross
PRE-OP DIAGNOSIS:  Other anemia 29-Jun-2020 13:26:02  Josiane Jordan

## 2020-07-02 NOTE — PROGRESS NOTE ADULT - ATTENDING COMMENTS
80M, HTN, HL, CAD/stents, RA/psoriatic arthritis/plaque psoriasis, hx prostate cancer/brachiotherapy 2009, chronic unrinary retention/intermittent self-caths - mgmt for GIB, colonic mass - c/f malignancy - for Vira procedure today  -to OR for Kang procedure today - postop mgmt per Surgery  -prn analgesia  -HTN, HL, CAD - continue metoprolol, hctz, lipitor  - - continue finasteride, flomax; Perez in place currently  -dvt prophy 80M, HTN, HL, CAD/stents, RA/psoriatic arthritis/plaque psoriasis, hx prostate cancer/brachiotherapy 2009, chronic unrinary retention/intermittent self-caths - mgmt for GIB/acute blood loss anemia, colonic mass - c/f malignancy - for Vira procedure today  -to OR for Kang procedure today - postop mgmt per Surgery  -trending hgb for stability and need for supportive transfusions  -prn analgesia  -HTN, HL, CAD - continue metoprolol, hctz, lipitor  - - continue finasteride, flomax; Perez in place currently  -dvt prophy

## 2020-07-02 NOTE — PROGRESS NOTE ADULT - SUBJECTIVE AND OBJECTIVE BOX
JOSE CRUZ TIERNEY  MRN-[id number] 80y    GENERAL SURGERY/ DR. LOCKHART    POST OP CHECK     OFFERS NO COMPLAINTS  NO N/V  TOLERATING CLEAR LIQUID DIET        MEDICATIONS  (STANDING):    ferrous    sulfate 325 milliGRAM(s) Oral daily  finasteride 5 milliGRAM(s) Oral daily  fluticasone propionate 50 MICROgram(s)/spray Nasal Spray 2 Spray(s) Both Nostrils <User Schedule>  hydrochlorothiazide 12.5 milliGRAM(s) Oral daily  lactated ringers. 1000 milliLiter(s) (50 mL/Hr) IV Continuous <Continuous>  levothyroxine 25 MICROGram(s) Oral daily  loratadine 10 milliGRAM(s) Oral daily  metoprolol succinate  milliGRAM(s) Oral daily  metroNIDAZOLE  IVPB 500 milliGRAM(s) IV Intermittent every 8 hours  montelukast 10 milliGRAM(s) Oral at bedtime  predniSONE   Tablet 1 milliGRAM(s) Oral four times a day  senna 2 Tablet(s) Oral at bedtime  simvastatin 40 milliGRAM(s) Oral at bedtime  tamsulosin 0.4 milliGRAM(s) Oral at bedtime    MEDICATIONS  (PRN):    acetaminophen   Tablet .. 650 milliGRAM(s) Oral every 6 hours PRN Temp greater or equal to 38C (100.4F), Mild Pain (1 - 3)  HYDROmorphone  Injectable 0.5 milliGRAM(s) IV Push every 3 hours PRN Severe Pain (7 - 10)  ibuprofen  Tablet. 400 milliGRAM(s) Oral every 6 hours PRN Temp greater or equal to 38.5C (101.3F), Moderate Pain (4 - 6)    Vital Signs Last 24 Hrs  T(C): 36.8 (02 Jul 2020 23:05), Max: 37.3 (02 Jul 2020 16:45)  T(F): 98.2 (02 Jul 2020 23:05), Max: 99.1 (02 Jul 2020 16:45)  HR: 59 (02 Jul 2020 23:05) (55 - 70)  BP: 137/71 (02 Jul 2020 23:05) (124/69 - 154/70)  BP(mean): --  RR: 18 (02 Jul 2020 23:05) (14 - 18)  SpO2: 98% (02 Jul 2020 23:05) (92% - 100%)      07-01-20 @ 07:01  -  07-02-20 @ 07:00  --------------------------------------------------------  IN: 900 mL / OUT: 2300 mL / NET: -1400 mL    07-02-20 @ 07:01  -  07-03-20 @ 00:03  --------------------------------------------------------  IN: 250 mL / OUT: 600 mL / NET: -350 mL    POTTER CATH IN PLACE CONCENTRATED URINE 500 ML        ABDOMEN: UMBILICAL AND ALL TROCAR SITES ARE DRY AND INTACT. COLOSTOMY IN PLACE EDEMATOUS, VIABLE, NO AIR/ KIMBER IN THE  BAG. + BS, SOFT, NON DISTENDED, SOME INCISIONAL TENDERNESS                                 ASSESSMENT &  PLAN:  S/P LAPAROSCOPIC MOBILIZATION SPLENIC FLEXURE AND NASRIN COLECTOMY 7/02    CONTINUE CLEAR LIQUID DIET  CONTINUE HYDRATION   CONTINUE VITALS AND I& Os  Q 4 HOURS     MAINTAIN POTTER CATH   PAIN MANAGEMENT  DVT PROPHYLAXIS SCD, LOVENOX IN AM    F/U AM LABS, REPLACE ELECTROLYTES AS NEEDED  FOLLOW UP PATHOLOGY  MEDICAL MANAGEMENT AS PER PRIMARY TEAM JOSE CRUZ TIERNEY  MRN-[id number] 80y    GENERAL SURGERY/ DR. LOCKHART    POST OP CHECK     OFFERS NO COMPLAINTS  NO N/V  TOLERATING CLEAR LIQUID DIET        MEDICATIONS  (STANDING):    ferrous    sulfate 325 milliGRAM(s) Oral daily  finasteride 5 milliGRAM(s) Oral daily  fluticasone propionate 50 MICROgram(s)/spray Nasal Spray 2 Spray(s) Both Nostrils <User Schedule>  hydrochlorothiazide 12.5 milliGRAM(s) Oral daily  lactated ringers. 1000 milliLiter(s) (50 mL/Hr) IV Continuous <Continuous>  levothyroxine 25 MICROGram(s) Oral daily  loratadine 10 milliGRAM(s) Oral daily  metoprolol succinate  milliGRAM(s) Oral daily  metroNIDAZOLE  IVPB 500 milliGRAM(s) IV Intermittent every 8 hours  montelukast 10 milliGRAM(s) Oral at bedtime  predniSONE   Tablet 1 milliGRAM(s) Oral four times a day  senna 2 Tablet(s) Oral at bedtime  simvastatin 40 milliGRAM(s) Oral at bedtime  tamsulosin 0.4 milliGRAM(s) Oral at bedtime    MEDICATIONS  (PRN):    acetaminophen   Tablet .. 650 milliGRAM(s) Oral every 6 hours PRN Temp greater or equal to 38C (100.4F), Mild Pain (1 - 3)  HYDROmorphone  Injectable 0.5 milliGRAM(s) IV Push every 3 hours PRN Severe Pain (7 - 10)  ibuprofen  Tablet. 400 milliGRAM(s) Oral every 6 hours PRN Temp greater or equal to 38.5C (101.3F), Moderate Pain (4 - 6)    Vital Signs Last 24 Hrs  T(C): 36.8 (02 Jul 2020 23:05), Max: 37.3 (02 Jul 2020 16:45)  T(F): 98.2 (02 Jul 2020 23:05), Max: 99.1 (02 Jul 2020 16:45)  HR: 59 (02 Jul 2020 23:05) (55 - 70)  BP: 137/71 (02 Jul 2020 23:05) (124/69 - 154/70)  BP(mean): --  RR: 18 (02 Jul 2020 23:05) (14 - 18)  SpO2: 98% (02 Jul 2020 23:05) (92% - 100%)      07-01-20 @ 07:01  -  07-02-20 @ 07:00  --------------------------------------------------------  IN: 900 mL / OUT: 2300 mL / NET: -1400 mL    07-02-20 @ 07:01  -  07-03-20 @ 00:03  --------------------------------------------------------  IN: 250 mL / OUT: 600 mL / NET: -350 mL    POTTER CATH IN PLACE CONCENTRATED URINE 500 ML        ABDOMEN: UMBILICAL AND ALL TROCAR SITES ARE DRY AND INTACT. COLOSTOMY IN PLACE EDEMATOUS, VIABLE, NO AIR/ STOOL IN THE  BAG. + BS, SOFT, NON DISTENDED, SOME INCISIONAL TENDERNESS                                 ASSESSMENT &  PLAN:  S/P LAPAROSCOPIC MOBILIZATION SPLENIC FLEXURE AND NASRIN COLECTOMY 7/02    CONTINUE CLEAR LIQUID DIET  CONTINUE HYDRATION   CONTINUE VITALS AND I& Os  Q 4 HOURS     MAINTAIN POTTER CATH   PAIN MANAGEMENT  DVT PROPHYLAXIS SCD, LOVENOX IN AM    F/U AM LABS, REPLACE ELECTROLYTES AS NEEDED  FOLLOW UP PATHOLOGY  MEDICAL MANAGEMENT AS PER PRIMARY TEAM

## 2020-07-02 NOTE — PROGRESS NOTE ADULT - SUBJECTIVE AND OBJECTIVE BOX
[facility] -- Noe Land, Jamel Sung Pannella, Patel, Savella  Office # [phone]      Follow Up:    Gi bleed, CAD  Subjective/Observations:   No events overnight resting comfortably in bed.  No complaints of chest pain, dyspnea, or palpitations reported. No signs of orthopnea or PND.     REVIEW OF SYSTEMS: All other review of systems is negative unless indicated above    PAST MEDICAL & SURGICAL HISTORY:  Asthma  Melanoma in situ  Hypothyroid  H/O Clostridium difficile infection  Urinary retention  Plaque psoriasis  History of urinary self-catheterization  Arthritis  Prostate CA: seed therapy aug 2009  Hyperlipidemia  Hypertension  CAD (coronary artery disease)  Acute MI  S/P tonsillectomy  H/O hernia repair  S/P cardiac cath      MEDICATIONS  (STANDING):  ferrous    sulfate 325 milliGRAM(s) Oral daily  finasteride 5 milliGRAM(s) Oral daily  fluticasone propionate 50 MICROgram(s)/spray Nasal Spray 2 Spray(s) Both Nostrils <User Schedule>  hydrochlorothiazide 12.5 milliGRAM(s) Oral daily  lactase Tablet 1 Tablet(s) Oral three times a day with meals  lactated ringers 1000 milliLiter(s) (100 mL/Hr) IV Continuous <Continuous>  levothyroxine 25 MICROGram(s) Oral daily  loratadine 10 milliGRAM(s) Oral daily  metoprolol succinate  milliGRAM(s) Oral daily  montelukast 10 milliGRAM(s) Oral at bedtime  pantoprazole  Injectable 40 milliGRAM(s) IV Push two times a day  phenazopyridine 100 milliGRAM(s) Oral every 8 hours  potassium chloride  10 mEq/100 mL IVPB 10 milliEquivalent(s) IV Intermittent every 1 hour  predniSONE   Tablet 1 milliGRAM(s) Oral four times a day  simvastatin 40 milliGRAM(s) Oral at bedtime  tamsulosin 0.4 milliGRAM(s) Oral at bedtime    MEDICATIONS  (PRN):      Allergies    cephalexin (Urticaria)  clindamycin (Diarrhea)  erythromycin (Other)  Methotrexate Sodium (Unknown)  penicillins (Hives)  sporalin (oxypsoralin/PUVA) (Other)  sulfa drugs (Rash)    Intolerances        Vital Signs Last 24 Hrs  T(C): 36.4 (02 Jul 2020 05:40), Max: 36.9 (01 Jul 2020 13:09)  T(F): 97.6 (02 Jul 2020 05:40), Max: 98.5 (01 Jul 2020 13:09)  HR: 70 (02 Jul 2020 05:40) (66 - 75)  BP: 124/69 (02 Jul 2020 05:40) (124/69 - 151/65)  BP(mean): --  RR: 18 (02 Jul 2020 05:40) (17 - 18)  SpO2: 92% (02 Jul 2020 05:40) (92% - 97%)    I&O's Summary    01 Jul 2020 07:01  -  02 Jul 2020 07:00  --------------------------------------------------------  IN: 900 mL / OUT: 2300 mL / NET: -1400 mL      Weight (kg): 47.2 (07-02 @ 06:49)    PHYSICAL EXAM:  TELE: SB  Constitutional: NAD, awake and alert, well-developed  HEENT: Moist Mucous Membranes, Anicteric  Pulmonary: Non-labored, breath sounds are clear bilaterally, No wheezing, crackles or rhonchi  Cardiovascular: Regular, S1 and S2 nl, No murmurs, rubs, gallops or clicks  Gastrointestinal: Bowel Sounds present, soft, nontender.   Lymph: No lymphadenopathy. No peripheral edema.  Skin: No visible rashes or ulcers.  Psych:  Mood & affect appropriate    LABS: All Labs Reviewed:                        9.6    9.84  )-----------( 254      ( 02 Jul 2020 06:42 )             30.4                         9.5    13.65 )-----------( 269      ( 01 Jul 2020 06:16 )             30.2                         10.2   14.29 )-----------( 299      ( 30 Jun 2020 07:41 )             32.6     02 Jul 2020 06:42    145    |  112    |  6      ----------------------------<  79     2.7     |  28     |  0.60   01 Jul 2020 06:16    142    |  109    |  9      ----------------------------<  88     3.7     |  27     |  0.74   30 Jun 2020 07:41    142    |  110    |  8      ----------------------------<  82     3.3     |  27     |  0.78     Ca    8.1        02 Jul 2020 06:42  Ca    8.3        01 Jul 2020 06:16  Ca    8.6        30 Jun 2020 07:41    TPro  4.7    /  Alb  1.9    /  TBili  0.3    /  DBili  x      /  AST  50     /  ALT  54     /  AlkPhos  161    02 Jul 2020 06:42  TPro  5.3    /  Alb  2.3    /  TBili  0.5    /  DBili  x      /  AST  64     /  ALT  53     /  AlkPhos  147    30 Jun 2020 07:41    PT/INR - ( 02 Jul 2020 06:42 )   PT: 14.3 sec;   INR: 1.23 ratio                  ECG:  < from: 12 Lead ECG (06.25.20 @ 12:09) >  Ventricular Rate 69 BPM    Atrial Rate 69 BPM    P-R Interval 134 ms    QRS Duration 90 ms    Q-T Interval 440 ms    QTC Calculation(Bezet) 471 ms    P Axis 61 degrees    R Axis -31 degrees    T Axis 133 degrees    Diagnosis Line Normal sinus rhythm  Left axis deviation  Left ventricular hypertrophy with repolarization abnormality  Inferior infarct (cited on or before 29-MAR-2010)  Nonspecific ST abnormality  Abnormal ECG  Confirmed by francois Kaplan ([id number]) on 6/25/2020 2:14:26 PM    < end of copied text >        Radiology:  < from: CT Chest w/ Oral Cont and w/ IV Cont (06.29.20 @ 16:59) >  EXAM:  CT ABDOMEN AND PELVIS OC IC                          EXAM:  CT CHEST OC IC                            PROCEDURE DATE:  06/29/2020          INTERPRETATION:  CLINICAL INFORMATION: Anemia. Obstructing sigmoid mass on colonoscopy.  Gastric polyps.    COMPARISON: None available.    PROCEDURE:   CT of the Chest, Abdomen and Pelvis was performed with intravenous contrast.   Intravenous contrast: 90 ml Omnipaque 350. 10 ml discarded.  Oral contrast: None.  Sagittal and coronal reformats were performed.    FINDINGS:     CHEST:    LUNGS AND LARGE AIRWAYS: PLEURA:   There are small bilateral pleural effusions with underlying compressive atelectasis.  The central airways remain patent.    VESSELS: Atherosclerotic calcification of thoracic aorta and coronary arteries.    HEART: Heart size is normal. No pericardial effusion.    MEDIASTINUM AND SOURAV: No lymphadenopathy.    CHEST WALL AND LOWER NECK: Within normal limits.    ABDOMEN AND PELVIS:    LIVER: There is a large conglomerate heterogeneous mass replacing the majority of the right hepatic lobe liver parenchyma. There is nodular contour of the liver and several low density lesions throughout the left hepatic lobe. Findings are suggestive of diffuse metastatic disease.  BILE DUCTS: Normal caliber.  GALLBLADDER: 6 mm polypoid density projecting within the lumen of the gallbladder could reflect polyp or gallstone. No gallbladder wall thickening.  SPLEEN: Within normal limits.  Probable accessory splenules.  PANCREAS: Within normal limits.  ADRENALS: Within normal limits.  KIDNEYS/URETERS: Large cyst upper pole left kidney. Smaller indeterminate hypodense bilateral renal lesions.    Bilateral extrarenal pelvises.    Evaluation of the pelvis is limited due to extensive metallic streak artifact related to patient's bilateral hip arthroplasties.  BLADDER: Limited evaluation.  Possible left-sided bladder diverticulum.  REPRODUCTIVE ORGANS: Limited evaluation. Radiation seed implants are present.    BOWEL: Evaluation of the rectosigmoid colon limited due to metallic streak artifact.  Sigmoid diverticulosis with segmental bowel wall thickening.  The descending colon is underdistended.  No bowel obstruction noted.    PERITONEUM: There is small to moderate free fluid within the pelvis.  There is mild nodularity along the peritoneal surface, example, image 77, series 2.    VESSELS: Atherosclerotic calcification.  RETROPERITONEUM/LYMPH NODES: No enlarged retroperitoneal lymphadenopathy.      ABDOMINAL WALL: Within normal limits.    BONES: Mottled lytic and sclerotic appearance bilateral posterior iliac bones with scattered sclerotic foci within the pelvis; and T6 and T7 vertebral bodies; findings suspicious for osseous metastatic disease.    IMPRESSION:     Small bilateral pleural effusions and underlying compressive atelectasis.    CT findings suggestive of diffuse hepatic metastatic disease.    Small to moderate pelvic ascites.  Mild peritoneal surface nodularity.  Findings may reflect peritoneal carcinomatosis.    Findings suspicious for osseous metastatic disease as discussed.    Recommend further evaluation with PET/CT scan.    Other findings as discussed above.                ALFRED WASHINGTON M.D., ATTENDING RADIOLOGIST  This document has been electronically signed. Jun 29 2020  5:58PM        < end of copied text >

## 2020-07-02 NOTE — PROGRESS NOTE ADULT - ASSESSMENT
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  -seen by surgery, for palliative colon resection   -discussed w pt, who is aware of significance of findings.    colon resection scheduled for today 7/2/2020    RECOMMEND:   Planned palliative resection for near-obstructing colon mass today  to review pathology including additional biomarkers  transfuse as clinically indicated.

## 2020-07-02 NOTE — PROGRESS NOTE ADULT - ASSESSMENT
MARY on CKD 2  GI bleeding  HTN  Anemia  Sigmoid Mass  Hypokalemia    -BLCr 0.9  -MARY likely 2/2 hypoxic/prerenal injury driven by GI bleeding  -UA 30 protein  -Ur lytes reviewed   -Renal indices remain stable at baseline range; electrolytes are stable as well  -PRBCs per primary; Hb currently stable  -BP well controlled; tolerating HCTZ  -Surg eval noted; may benefit from palliative diverting colostomy vs resection  -K repletion ordered    Thank you

## 2020-07-02 NOTE — BRIEF OPERATIVE NOTE - NSICDXBRIEFPOSTOP_GEN_ALL_CORE_FT
POST-OP DIAGNOSIS:  Large bowel obstruction 02-Jul-2020 15:20:57  Adrian Ross  Metastatic colon cancer to liver 02-Jul-2020 15:20:51  Adrian Ross
POST-OP DIAGNOSIS:  Gastric polyp 29-Jun-2020 13:26:44  Josiane Jordan  Colonic mass 29-Jun-2020 13:26:32  Josiane Jordan

## 2020-07-02 NOTE — PROGRESS NOTE ADULT - SUBJECTIVE AND OBJECTIVE BOX
Date/Time Patient Seen:  		  Referring MD:   Data Reviewed	       Patient is a 80y old  Male who presents with a chief complaint of GIB, colon cancer (01 Jul 2020 15:26)      Subjective/HPI     PAST MEDICAL & SURGICAL HISTORY:  Asthma  Melanoma in situ  Hypothyroid  H/O Clostridium difficile infection  Urinary retention  Plaque psoriasis  History of urinary self-catheterization  Arthritis  Prostate CA: seed therapy aug 2009  Hyperlipidemia  Hypertension  CAD (coronary artery disease)  Acute MI  S/P tonsillectomy  H/O hernia repair  S/P cardiac cath  No significant past surgical history        Medication list         MEDICATIONS  (STANDING):  ferrous    sulfate 325 milliGRAM(s) Oral daily  finasteride 5 milliGRAM(s) Oral daily  fluticasone propionate 50 MICROgram(s)/spray Nasal Spray 2 Spray(s) Both Nostrils <User Schedule>  hydrochlorothiazide 12.5 milliGRAM(s) Oral daily  lactase Tablet 1 Tablet(s) Oral three times a day with meals  lactated ringers. 1000 milliLiter(s) (50 mL/Hr) IV Continuous <Continuous>  levothyroxine 25 MICROGram(s) Oral daily  loratadine 10 milliGRAM(s) Oral daily  metoprolol succinate  milliGRAM(s) Oral daily  montelukast 10 milliGRAM(s) Oral at bedtime  pantoprazole  Injectable 40 milliGRAM(s) IV Push two times a day  phenazopyridine 100 milliGRAM(s) Oral every 8 hours  predniSONE   Tablet 1 milliGRAM(s) Oral four times a day  simvastatin 40 milliGRAM(s) Oral at bedtime  tamsulosin 0.4 milliGRAM(s) Oral at bedtime    MEDICATIONS  (PRN):         Vitals log        ICU Vital Signs Last 24 Hrs  T(C): 36.4 (02 Jul 2020 05:40), Max: 36.9 (01 Jul 2020 13:09)  T(F): 97.6 (02 Jul 2020 05:40), Max: 98.5 (01 Jul 2020 13:09)  HR: 70 (02 Jul 2020 05:40) (66 - 75)  BP: 124/69 (02 Jul 2020 05:40) (124/69 - 151/65)  BP(mean): --  ABP: --  ABP(mean): --  RR: 18 (02 Jul 2020 05:40) (17 - 18)  SpO2: 92% (02 Jul 2020 05:40) (92% - 97%)           Input and Output:  I&O's Detail    30 Jun 2020 07:01  -  01 Jul 2020 07:00  --------------------------------------------------------  IN:  Total IN: 0 mL    OUT:    Ureteral Catheter: 1950 mL  Total OUT: 1950 mL    Total NET: -1950 mL      01 Jul 2020 07:01  -  02 Jul 2020 06:34  --------------------------------------------------------  IN:    lactated ringers.: 400 mL    Oral Fluid: 500 mL  Total IN: 900 mL    OUT:    Ureteral Catheter: 2300 mL  Total OUT: 2300 mL    Total NET: -1400 mL          Lab Data                        9.5    13.65 )-----------( 269      ( 01 Jul 2020 06:16 )             30.2     07-01    142  |  109<H>  |  9   ----------------------------<  88  3.7   |  27  |  0.74    Ca    8.3<L>      01 Jul 2020 06:16    TPro  5.3<L>  /  Alb  2.3<L>  /  TBili  0.5  /  DBili  x   /  AST  64<H>  /  ALT  53  /  AlkPhos  147<H>  06-30            Review of Systems	      Objective     Physical Examination    heart s1s2  lung dec BS  abd soft      Pertinent Lab findings & Imaging      Ana:  NO   Adequate UO     I&O's Detail    30 Jun 2020 07:01  -  01 Jul 2020 07:00  --------------------------------------------------------  IN:  Total IN: 0 mL    OUT:    Ureteral Catheter: 1950 mL  Total OUT: 1950 mL    Total NET: -1950 mL      01 Jul 2020 07:01  -  02 Jul 2020 06:34  --------------------------------------------------------  IN:    lactated ringers.: 400 mL    Oral Fluid: 500 mL  Total IN: 900 mL    OUT:    Ureteral Catheter: 2300 mL  Total OUT: 2300 mL    Total NET: -1400 mL               Discussed with:     Cultures:	        Radiology

## 2020-07-02 NOTE — PROGRESS NOTE ADULT - SUBJECTIVE AND OBJECTIVE BOX
Hospital day: 7    80y Male admitted with Gastrointestinal hemorrhage  .    Patient seen and examined bedside resting comfortably.  Pt without complaints.  No overnight issues. Has been NPO overnight for Mai procedure today.  Pt just wants to make sure belongings go to security prior to procedure.  Reaffirmed they will go to security.  Pt denies N/V, fevers, chills, SOB, chest pain, other general complaints.     T(F): 97.6 (07-02-20 @ 05:40), Max: 98.5 (07-01-20 @ 13:09)  HR: 70 (07-02-20 @ 05:40) (66 - 75)  BP: 124/69 (07-02-20 @ 05:40) (124/69 - 151/65)  RR: 18 (07-02-20 @ 05:40) (17 - 18)  SpO2: 92% (07-02-20 @ 05:40) (92% - 97%)  Wt(kg): --  CAPILLARY BLOOD GLUCOSE          PHYSICAL EXAM:  General: NAD  Neuro:  Alert & oriented x 3  CV: +S1+S2 regular rate and rhythm  Lung: clear to ausculation bilaterally  Abdomen: soft, Nd, mild tenderness/pressure  suprapubic region  Extremities: no pedal edema or calf tenderness noted   :     LABS:                        9.6    9.84  )-----------( 254      ( 02 Jul 2020 06:42 )             30.4     07-02    145  |  112<H>  |  6<L>  ----------------------------<  79  2.7<LL>   |  28  |  0.60    Ca    8.1<L>      02 Jul 2020 06:42    TPro  4.7<L>  /  Alb  1.9<L>  /  TBili  0.3  /  DBili  x   /  AST  50<H>  /  ALT  54  /  AlkPhos  161<H>  07-02    PT/INR - ( 02 Jul 2020 06:42 )   PT: 14.3 sec;   INR: 1.23 ratio           I&O's Detail    01 Jul 2020 07:01  -  02 Jul 2020 07:00  --------------------------------------------------------  IN:    lactated ringers.: 400 mL    Oral Fluid: 500 mL  Total IN: 900 mL    OUT:    Ureteral Catheter: 2300 mL  Total OUT: 2300 mL    Total NET: -1400 mL          Impression: 80y Male admitted with Gastrointestinal hemorrhage        Plan:  -continue VTE prophylaxis- SCD, OOB   -Increase activity with PT, OOB, Ambulate  -To OR this Am for mai procedure.   -Will follow up post operatively  -Hypokalemia repleted with 3 potassium riders.   -will discuss with Dr Ross. Hospital day: 7      80y Male admitted with Gastrointestinal hemorrhage      Patient seen and examined bedside resting comfortably.  Pt without complaints.  No overnight issues. Has been NPO overnight for Mai procedure today.  Pt just wants to make sure belongings go to security prior to procedure.  Reaffirmed they will go to security.  Pt denies N/V, fevers, chills, SOB, chest pain, other general complaints.       T(F): 97.6 (07-02-20 @ 05:40), Max: 98.5 (07-01-20 @ 13:09)  HR: 70 (07-02-20 @ 05:40) (66 - 75)  BP: 124/69 (07-02-20 @ 05:40) (124/69 - 151/65)  RR: 18 (07-02-20 @ 05:40) (17 - 18)  SpO2: 92% (07-02-20 @ 05:40) (92% - 97%)      PHYSICAL EXAM:  General: NAD  Neuro:  Alert & oriented x 3  CV: +S1+S2 regular rate and rhythm  Lung: clear to ausculation bilaterally  Abdomen: soft, Nd, mild tenderness/pressure  suprapubic region  Extremities: no pedal edema or calf tenderness noted   : Normal external genitalia      LABS:                        9.6    9.84  )-----------( 254      ( 02 Jul 2020 06:42 )             30.4       07-02    145  |  112<H>  |  6<L>  ----------------------------<  79  2.7<LL>   |  28  |  0.60    Ca    8.1<L>      02 Jul 2020 06:42    TPro  4.7<L>  /  Alb  1.9<L>  /  TBili  0.3  /  DBili  x   /  AST  50<H>  /  ALT  54  /  AlkPhos  161<H>  07-02    PT/INR - ( 02 Jul 2020 06:42 )   PT: 14.3 sec;   INR: 1.23 ratio         I&O's Detail    01 Jul 2020 07:01  -  02 Jul 2020 07:00  --------------------------------------------------------  IN:    lactated ringers.: 400 mL    Oral Fluid: 500 mL  Total IN: 900 mL    OUT:    Ureteral Catheter: 2300 mL  Total OUT: 2300 mL    Total NET: -1400 mL      Impression: 80y Male admitted with Gastrointestinal hemorrhage      Plan:  -continue VTE prophylaxis- SCD, OOB   -Increase activity with PT, OOB, Ambulate  -To OR this Am for mai procedure.   -Will follow up post operatively  -Hypokalemia repleted with 3 potassium riders.   -will discuss with Dr Ross.

## 2020-07-03 NOTE — PROGRESS NOTE ADULT - ATTENDING COMMENTS
80M, HTN, HL, CAD/stents, RA/psoriatic arthritis/plaque psoriasis, hx prostate cancer/brachiotherapy 2009, chronic urinary retention/intermittent self-caths - mgmt for GIB/acute blood loss anemia, near-obstructing colonic mass - c/f malignancy - s/p Vira colectomy procedure 7/2  -s/p OR for Kang procedure 7/2 - postop mgmt per Surgery  -f/u path results - Heme to determine palliative tx options   -trending hgb for stability and need for supportive transfusions - hgb 9s currently  -prn analgesia  -HTN, HL, CAD - continue metoprolol, hctz, lipitor  -/chronic urinary retention - continue finasteride, flomax; Perez in place currently  -dvt prophy  -dispo - likely will need HITESH placement at dispo given new colostomy, Perez, and need for assistive resources  -PT mobilization 80M, HTN, HL, hypothy, CAD/stents, RA/psoriatic arthritis/plaque psoriasis, hx prostate cancer/brachiotherapy 2009, chronic urinary retention/intermittent self-caths - mgmt for GIB/acute blood loss anemia, near-obstructing colonic mass - c/f malignancy - s/p Vira colectomy procedure 7/2  -s/p OR for Kang procedure 7/2 - postop mgmt per Surgery  -f/u path results - HemeOnc to determine palliative tx options   -trending hgb for stability and need for supportive transfusions - hgb 9s currently  -prn analgesia  -leukocytosis - likely reactive; no jaylin e/o acute infection - monitoring off abxs for now  -HTN, HL, CAD - continue metoprolol, hctz, lipitor  -/chronic urinary retention - continue finasteride, flomax; Perez in place currently  -hypothy - continue synthroid  -dvt prophy  -dispo - likely will need HITESH placement at dispo given new colostomy, Perez, and need for assistive resources  -PT mobilization

## 2020-07-03 NOTE — PROGRESS NOTE ADULT - SUBJECTIVE AND OBJECTIVE BOX
STATUS POST:  laparoscopic Vira's    POST OPERATIVE DAY #: 1    SUBJECTIVE:  Patient seen and examined at bedside.  No overnight events.  Patient with complaint of abdominal pain. Asking for breakfast. No flatus or stool in bag. Patient denies any fevers, chills, chest pain, shortness of breath, abdominal pain, nausea, vomiting or diarrhea.    Vital Signs Last 24 Hrs  T(C): 36.6 (03 Jul 2020 05:16), Max: 37.3 (02 Jul 2020 16:45)  T(F): 97.9 (03 Jul 2020 05:16), Max: 99.1 (02 Jul 2020 16:45)  HR: 62 (03 Jul 2020 05:16) (55 - 70)  BP: 150/72 (03 Jul 2020 05:16) (127/56 - 154/70)  BP(mean): --  RR: 18 (03 Jul 2020 05:16) (14 - 18)  SpO2: 99% (03 Jul 2020 05:16) (98% - 100%)    PHYSICAL EXAM:  GENERAL: No acute distress, well-developed  HEAD:  Atraumatic, Normocephalic  ABDOMEN: Soft, appropriately tender particularly around ostomy site, non-distended. Ostomy pink and patent no signs of stool or flatus.  NEUROLOGY: A&O x 3, no focal deficits    I&O's Summary    02 Jul 2020 07:01  -  03 Jul 2020 07:00  --------------------------------------------------------  IN: 650 mL / OUT: 1150 mL / NET: -500 mL      I&O's Detail    02 Jul 2020 07:01  -  03 Jul 2020 07:00  --------------------------------------------------------  IN:    lactated ringers.: 650 mL  Total IN: 650 mL    OUT:    Indwelling Catheter - Urethral: 100 mL    Ureteral Catheter: 1050 mL  Total OUT: 1150 mL    Total NET: -500 mL    MEDICATIONS  (STANDING):  enoxaparin Injectable 40 milliGRAM(s) SubCutaneous daily  ferrous    sulfate 325 milliGRAM(s) Oral daily  finasteride 5 milliGRAM(s) Oral daily  fluticasone propionate 50 MICROgram(s)/spray Nasal Spray 2 Spray(s) Both Nostrils <User Schedule>  hydrochlorothiazide 12.5 milliGRAM(s) Oral daily  lactated ringers. 1000 milliLiter(s) (50 mL/Hr) IV Continuous <Continuous>  levothyroxine 25 MICROGram(s) Oral daily  loratadine 10 milliGRAM(s) Oral daily  metoprolol succinate  milliGRAM(s) Oral daily  montelukast 10 milliGRAM(s) Oral at bedtime  pantoprazole    Tablet 40 milliGRAM(s) Oral before breakfast  predniSONE   Tablet 1 milliGRAM(s) Oral four times a day  senna 2 Tablet(s) Oral at bedtime  simvastatin 40 milliGRAM(s) Oral at bedtime  tamsulosin 0.4 milliGRAM(s) Oral at bedtime    MEDICATIONS  (PRN):  acetaminophen   Tablet .. 650 milliGRAM(s) Oral every 6 hours PRN Temp greater or equal to 38C (100.4F), Mild Pain (1 - 3)  HYDROmorphone  Injectable 0.5 milliGRAM(s) IV Push every 3 hours PRN Severe Pain (7 - 10)  ibuprofen  Tablet. 400 milliGRAM(s) Oral every 6 hours PRN Temp greater or equal to 38.5C (101.3F), Moderate Pain (4 - 6)    LABS:                        9.6    14.34 )-----------( 295      ( 03 Jul 2020 06:48 )             30.8     07-03    142  |  108  |  9   ----------------------------<  97  3.7   |  29  |  0.73    Ca    8.3<L>      03 Jul 2020 06:48  Mg     1.5     07-03    TPro  4.7<L>  /  Alb  1.9<L>  /  TBili  0.3  /  DBili  x   /  AST  50<H>  /  ALT  54  /  AlkPhos  161<H>  07-02    PT/INR - ( 02 Jul 2020 06:42 )   PT: 14.3 sec;   INR: 1.23 ratio      RADIOLOGY & ADDITIONAL STUDIES:  no new    ASSESSMENT    80y Male POD 1 s/p benito Vira's currently with some post op pain.    PLAN  - Discussed with Dr Ross.  - PT consulted  - Magnesium to be repleted  - Reassured about pain regiment, pt prefers to refrain from narcotics.  - incentive spirometer  - pain control  - OOB  - CLD, IVF, adv as ernesto   - serial abdominal exams  - labs in am    Surgical Team Contact Information  Spectralink: Ext: [phone] or [phone]  Pager: [phone] STATUS POST:  laparoscopic Vira's      POST OPERATIVE DAY #: 1      SUBJECTIVE:  Patient seen and examined at bedside.  No overnight events.  Patient with complaint of abdominal pain. Asking for breakfast. No flatus or stool in bag. Patient denies any fevers, chills, chest pain, shortness of breath, abdominal pain, nausea, vomiting or diarrhea.      Vital Signs Last 24 Hrs  T(F): 97.9 (03 Jul 2020 05:16), Max: 99.1 (02 Jul 2020 16:45)  HR: 62 (03 Jul 2020 05:16) (55 - 70)  BP: 150/72 (03 Jul 2020 05:16) (127/56 - 154/70)  RR: 18 (03 Jul 2020 05:16) (14 - 18)  SpO2: 99% (03 Jul 2020 05:16) (98% - 100%)      PHYSICAL EXAM:  GENERAL: No acute distress, well-developed  HEAD:  Atraumatic, Normocephalic  ABDOMEN: Soft, appropriately tender particularly around ostomy site, non-distended. Ostomy pink and patent no signs of stool or flatus.  NEUROLOGY: A&O x 3, no focal deficits      I&O's Summary    02 Jul 2020 07:01  -  03 Jul 2020 07:00  --------------------------------------------------------  IN: 650 mL / OUT: 1150 mL / NET: -500 mL      I&O's Detail    02 Jul 2020 07:01  -  03 Jul 2020 07:00  --------------------------------------------------------  IN:    lactated ringers.: 650 mL  Total IN: 650 mL    OUT:    Indwelling Catheter - Urethral: 100 mL    Ureteral Catheter: 1050 mL  Total OUT: 1150 mL    Total NET: -500 mL      MEDICATIONS  (STANDING):  enoxaparin Injectable 40 milliGRAM(s) SubCutaneous daily  ferrous    sulfate 325 milliGRAM(s) Oral daily  finasteride 5 milliGRAM(s) Oral daily  fluticasone propionate 50 MICROgram(s)/spray Nasal Spray 2 Spray(s) Both Nostrils <User Schedule>  hydrochlorothiazide 12.5 milliGRAM(s) Oral daily  lactated ringers. 1000 milliLiter(s) (50 mL/Hr) IV Continuous <Continuous>  levothyroxine 25 MICROGram(s) Oral daily  loratadine 10 milliGRAM(s) Oral daily  metoprolol succinate  milliGRAM(s) Oral daily  montelukast 10 milliGRAM(s) Oral at bedtime  pantoprazole    Tablet 40 milliGRAM(s) Oral before breakfast  predniSONE   Tablet 1 milliGRAM(s) Oral four times a day  senna 2 Tablet(s) Oral at bedtime  simvastatin 40 milliGRAM(s) Oral at bedtime  tamsulosin 0.4 milliGRAM(s) Oral at bedtime      MEDICATIONS  (PRN):  acetaminophen   Tablet .. 650 milliGRAM(s) Oral every 6 hours PRN Temp greater or equal to 38C (100.4F), Mild Pain (1 - 3)  HYDROmorphone  Injectable 0.5 milliGRAM(s) IV Push every 3 hours PRN Severe Pain (7 - 10)  ibuprofen  Tablet. 400 milliGRAM(s) Oral every 6 hours PRN Temp greater or equal to 38.5C (101.3F), Moderate Pain (4 - 6)      LABS:                        9.6    14.34 )-----------( 295      ( 03 Jul 2020 06:48 )             30.8     07-03    142  |  108  |  9   ----------------------------<  97  3.7   |  29  |  0.73    Ca    8.3<L>      03 Jul 2020 06:48  Mg     1.5     07-03    TPro  4.7<L>  /  Alb  1.9<L>  /  TBili  0.3  /  DBili  x   /  AST  50<H>  /  ALT  54  /  AlkPhos  161<H>  07-02    PT/INR - ( 02 Jul 2020 06:42 )   PT: 14.3 sec;   INR: 1.23 ratio        RADIOLOGY & ADDITIONAL STUDIES:  no new      ASSESSMENT    80y Male POD 1 s/p benito Constantino's currently with some post op pain.    PLAN  - Discussed with Dr Ross.  - PT consulted  - Magnesium to be repleted  - Reassured about pain regiment, pt prefers to refrain from narcotics.  - incentive spirometer  - pain control  - OOB  - CLD, IVF, adv as ernesto   - serial abdominal exams  - labs in am    Surgical Team Contact Information  Spectralink: Ext: [phone] or [phone]  Pager: [phone]

## 2020-07-03 NOTE — PROGRESS NOTE ADULT - SUBJECTIVE AND OBJECTIVE BOX
DEPARTMENT OF ANESTHESIA  POST ANESTHETIC EVALUATION    The Patient was interviewed and evaluated    Vital Signs Last 24 Hrs  T(C): 36.6 (03 Jul 2020 05:16), Max: 37.3 (02 Jul 2020 16:45)  T(F): 97.9 (03 Jul 2020 05:16), Max: 99.1 (02 Jul 2020 16:45)  HR: 62 (03 Jul 2020 05:16) (55 - 70)  BP: 150/72 (03 Jul 2020 05:16) (127/56 - 154/70)  BP(mean): --  RR: 18 (03 Jul 2020 05:16) (14 - 18)  SpO2: 99% (03 Jul 2020 05:16) (98% - 100%)    Evaluation:      (X ) No apparent complications or complaints regarding anesthesia care at this time  (X ) All questions were answered    Condition:  (X ) Stable      ( ) Guarded      ( ) Critical    Recommendations:  (X ) None     ( ) Other:

## 2020-07-03 NOTE — PROGRESS NOTE ADULT - ASSESSMENT
Matstaic Colon Ca s/p Jorge Luis's , Cath in and TOV later, will check PSA, if elevated may be false positive given Perez in palce, if wnl less likely PCa is an issue

## 2020-07-03 NOTE — PROGRESS NOTE ADULT - ASSESSMENT
81y/o M PMH of CAD, HTN, HLD, prostate cancer (seed radiation 2009), MI (nov 2000, angioplasty w 3 stents to the right coronary artery), rheumatoid and psoriatic arthritis, plaque psoriasis, b/l hip replacements (left 1998, right 2002), C diff in 2014, urinary retention with self catheretization hypothyroidism, mild asthma, melanoma in situ presents for GIB. Pt states he noticed right red blood in his stools for 6 months. Pt has been feeling weakness, weight loss of 19 pounds since June 2019. Pt sent in by outpatient hematologist Dr. Bergeron given low Hgb outpatient. (6.8 on 6/24) Pt denies SOB or GEORGES, however feels weak when pushing heavy objects. Pt was scheduled with his GI doctor Dr. Mullen for outpatient colonoscopy in March 2020. However, colonoscopy canceled due to COVID19. Pt self catheterizes 5-6 times a day.    Sigmoid mass   - Colonoscopy revealed large fungating near obstructing sigmoid colon mass.   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  He is POD #1 Laparoscopic mobilization of splenic flexure of colon and Laparoscopic Vira colectomy 02-Jul-2020   - Continue Perez   - Manage per surgery/Heme/Primary/GI    CAD S/P stent  - EKG is unchanged when compared to prior  - Pt w/o anginal complaints  - There is no evidence of significant arrhythmia.  - There is no evidence for meaningful  volume overload.  - TTE 12/1/18 mild AS, LVD,45 %, no need to repeat  - Continue Toprol  mg PO daily  - Continue Statin     HTN  -controlled  - Continue BB at home dose  - Continue HCTZ  - Monitor routine hemodynamics.     HLD  - Continue statin    Anemia  - S/P PRBCs  - Trend CBC  - Would transfuse to keep Hb>8 and watch volume status     MARY  - Creatinine improved   - resolved     - Monitor and replete lytes, keep K>4, Mg>2. - Magnesium 1.5 replaced.  F/U labs.  Cont tele with NSR and PACs/PVCs    - All other medical needs as per primary team.  - Other cardiovascular workup will depend on clinical course.  - Will continue to follow.      CAMILLA Aguilar-BC  Cardiology   Spectra #[phone] 81y/o M PMH of CAD, HTN, HLD, prostate cancer (seed radiation 2009), MI (nov 2000, angioplasty w 3 stents to the right coronary artery), rheumatoid and psoriatic arthritis, plaque psoriasis, b/l hip replacements (left 1998, right 2002), C diff in 2014, urinary retention with self catheretization hypothyroidism, mild asthma, melanoma in situ presents for GIB. Pt states he noticed right red blood in his stools for 6 months. Pt has been feeling weakness, weight loss of 19 pounds since June 2019. Pt sent in by outpatient hematologist Dr. Bergeron given low Hgb outpatient. (6.8 on 6/24) Pt denies SOB or GEORGES, however feels weak when pushing heavy objects. Pt was scheduled with his GI doctor Dr. Mullen for outpatient colonoscopy in March 2020. However, colonoscopy canceled due to COVID19. Pt self catheterizes 5-6 times a day.    Sigmoid mass   - Colonoscopy revealed large fungating near obstructing sigmoid colon mass.   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  He is POD #1 Laparoscopic mobilization of splenic flexure of colon and Laparoscopic Vira colectomy 02-Jul-2020   - Continue Perez   - Manage per surgery/Heme/Primary/GI    CAD S/P stent  - EKG is unchanged when compared to prior  - Pt w/o anginal complaints  - There is no evidence of significant arrhythmia.  - There is no evidence for meaningful  volume overload.  - TTE 12/1/18 mild AS, LVD,45 %, no need to repeat  - Continue Toprol  mg PO daily  - Continue Statin     HTN  - controlled  - Continue BB at home dose  - Continue HCTZ  - Monitor routine hemodynamics.     HLD  - Continue statin    Anemia  - S/P PRBCs  - Trend CBC  - Would transfuse to keep Hb>8 and watch volume status     MARY  - Creatinine improved   - resolved     - Monitor and replete lytes, keep K>4, Mg>2. - Magnesium 1.5 replaced.  F/U labs.  Cont tele with NSR and PACs/PVCs    - All other medical needs as per primary team.  - Other cardiovascular workup will depend on clinical course.  - Will continue to follow.      CAMILLA Aguilar-BC  Cardiology   Spectra #[phone]

## 2020-07-03 NOTE — PROGRESS NOTE ADULT - PROBLEM SELECTOR PLAN 1
s/p Laparoscopic mobilization of splenic flexure of colon - Laparoscopic Vira colectomy  seen and examined - VS noted - labs reviewed - Surgery follow up noted reviewed  I gerardo  dvt p  serial abd exam and stoma care  assist with ADL  out of bed  asthma - I gerardo - monitor Sat - wean off o2 support as tolerated - on Flonase and Singulair -   caution with Opioids  curr on Clears PO  reassurance and emotional support

## 2020-07-03 NOTE — PROGRESS NOTE ADULT - ASSESSMENT
IMPRESSION:   Metastatic colon cancer s/p palliative Vira colectomy on 7/02/20.        PLAN:   Follow up with surgery  Follow up with medical oncology  Follow up with medical team  Will sign off - call for any questions

## 2020-07-03 NOTE — PROGRESS NOTE ADULT - ASSESSMENT
MARY on CKD 2  GI bleeding  HTN  Anemia  Sigmoid Mass  Hypokalemia    -BLCr 0.9  -MARY likely 2/2 hypoxic/prerenal injury driven by GI bleeding  -UA 30 protein  -Ur lytes reviewed   -Renal indices remain stable at baseline range; electrolytes are stable as well  -PRBCs per primary; Hb currently stable  -BP well controlled; tolerating HCTZ  -Surg eval noted;  S/P OR  -Mag repletion ordered    Thank you

## 2020-07-03 NOTE — PROGRESS NOTE ADULT - SUBJECTIVE AND OBJECTIVE BOX
Gu Progress Note:  Resting comfortably, s/p Lap. Jorge Luis's, cath in and patient says he feels to week to stand    and  void  requesting cath remain in place  Ct with diffuse hepatic mets, Also has osseous mets      PAST MEDICAL & SURGICAL HISTORY:  Asthma  Melanoma in situ  Hypothyroid  H/O Clostridium difficile infection  Urinary retention  Plaque psoriasis  History of urinary self-catheterization  Arthritis  Prostate CA: seed therapy aug 2009  Hyperlipidemia  Hypertension  CAD (coronary artery disease)  Acute MI  S/P tonsillectomy  H/O hernia repair  S/P cardiac cath        MEDICATIONS  (STANDING):  enoxaparin Injectable 40 milliGRAM(s) SubCutaneous daily  ferrous    sulfate 325 milliGRAM(s) Oral daily  finasteride 5 milliGRAM(s) Oral daily  fluticasone propionate 50 MICROgram(s)/spray Nasal Spray 2 Spray(s) Both Nostrils <User Schedule>  hydrochlorothiazide 12.5 milliGRAM(s) Oral daily  lactated ringers. 1000 milliLiter(s) (50 mL/Hr) IV Continuous <Continuous>  levothyroxine 25 MICROGram(s) Oral daily  loratadine 10 milliGRAM(s) Oral daily  magnesium sulfate  IVPB 2 Gram(s) IV Intermittent once  metoprolol succinate  milliGRAM(s) Oral daily  montelukast 10 milliGRAM(s) Oral at bedtime  pantoprazole    Tablet 40 milliGRAM(s) Oral before breakfast  predniSONE   Tablet 1 milliGRAM(s) Oral four times a day  senna 2 Tablet(s) Oral at bedtime  simvastatin 40 milliGRAM(s) Oral at bedtime  tamsulosin 0.4 milliGRAM(s) Oral at bedtime    MEDICATIONS  (PRN):  acetaminophen   Tablet .. 650 milliGRAM(s) Oral every 6 hours PRN Temp greater or equal to 38C (100.4F), Mild Pain (1 - 3)  HYDROmorphone  Injectable 0.5 milliGRAM(s) IV Push every 3 hours PRN Severe Pain (7 - 10)  ibuprofen  Tablet. 400 milliGRAM(s) Oral every 6 hours PRN Temp greater or equal to 38.5C (101.3F), Moderate Pain (4 - 6)      Allergies    cephalexin (Urticaria)  clindamycin (Diarrhea)  erythromycin (Other)  lactose (Stomach Upset)  Methotrexate Sodium (Unknown)  penicillins (Hives)  sporalin (oxypsoralin/PUVA) (Other)  sulfa drugs (Rash)    Intolerances            FAMILY HISTORY:  FH: asthma  FH: Alzheimers disease      Vital Signs Last 24 Hrs  T(C): 37 (03 Jul 2020 12:51), Max: 37.3 (02 Jul 2020 16:45)  T(F): 98.6 (03 Jul 2020 12:51), Max: 99.1 (02 Jul 2020 16:45)  HR: 70 (03 Jul 2020 12:51) (55 - 70)  BP: 135/67 (03 Jul 2020 12:51) (129/72 - 150/72)  BP(mean): --  RR: 19 (03 Jul 2020 12:51) (15 - 19)  SpO2: 96% (03 Jul 2020 12:51) (96% - 99%)    PHYSICAL EXAM:    Constitutional: NAD, well-developed    Asymptomatic, AO  Abd: BS+, soft, NT/ND, No CVAT, colostomy  : Normal  phallus, patent  meatus, bilateral descended testes, no masses paez with grossly clear urine  Extremities: No peripheral edema    LABS:                        9.6    14.34 )-----------( 295      ( 03 Jul 2020 06:48 )             30.8     07-03    142  |  108  |  9   ----------------------------<  97  3.7   |  29  |  0.73    Ca    8.3<L>      03 Jul 2020 06:48  Mg     1.5     07-03    TPro  4.7<L>  /  Alb  1.9<L>  /  TBili  0.3  /  DBili  x   /  AST  50<H>  /  ALT  54  /  AlkPhos  161<H>  07-02    PT/INR - ( 02 Jul 2020 06:42 )   PT: 14.3 sec;   INR: 1.23 ratio             Urine Culture:   Hemoglobin: 9.6 g/dL (07-03 @ 06:48)  Hematocrit: 30.8 % (07-03 @ 06:48)  Hemoglobin: 9.6 g/dL (07-02 @ 06:42)  Hematocrit: 30.4 % (07-02 @ 06:42)      RADIOLOGY & ADDITIONAL STUDIES:

## 2020-07-03 NOTE — PROGRESS NOTE ADULT - SUBJECTIVE AND OBJECTIVE BOX
CC/F/U for:     HPI:  81 YO M with pmhx of CAD, HTN, HLD, prostate cancer (seed radiation 2009), MI (nov 2000, angioplasty w 3 stents to the right coronary artery), rheumatoid and psoriatic arthritis, plaque psoriasis, b/l hip replacements (left 1998, right 2002), C diff in 2014, urinary retention with self catheretization hypothyroidism, mild asthma, melanoma in situ presents for GIB. Pt states he noticed right red blood in his stools for 6 months. Pt has been feeling weakness, weight loss of 19 pounds since June 2019. Pt sent in by outpatient hematologist Dr. Bergeron given low Hgb outpatient. (6.8 on 6/24) Pt denies SOB or GEORGES, however feels weak when pushing heavy objects. Pt was scheduled with his GI doctor Dr. Mullen for outpatient colonoscopy in March 2020. However, colonoscopy canceled due to COVID19. Pt self catheterizes 5-6 times a day. Pt admits to burning prior to catheterization when his bladder is full. Pt denies fever, chills, hematemesis, hemoptysis, hematuria, melena, SOB, GEORGES, CP, abdominal pain.     ED vitals significant for temp 98 F, HR 79, /55, RR 16, 93% on room air. Pt's labs significant for +FOBT , wbc 13, Hgb 7.5, hct 25.2, 94% neutrophils, BUN 30, Cr 1.5, AST 95. Pt type and screen performed. CXR showed no acute abnormalities. Pt put on remote tele, made NPO, s/p pantoprazole 40 IV, famotidine 20 IV. 1 unit of pRBCs pending. EKG showed rate 69, NSR, LVH, LAD. (25 Jun 2020 13:46)        INTERVAL HPI/OVERNIGHT EVENTS:  Pt seen and examined at bedside.     Allergies/Intolerance: cephalexin (Urticaria)  clindamycin (Diarrhea)  erythromycin (Other)  lactose (Stomach Upset)  Methotrexate Sodium (Unknown)  penicillins (Hives)  sporalin (oxypsoralin/PUVA) (Other)  sulfa drugs (Rash)      MEDICATIONS  (STANDING):  enoxaparin Injectable 40 milliGRAM(s) SubCutaneous daily  ferrous    sulfate 325 milliGRAM(s) Oral daily  finasteride 5 milliGRAM(s) Oral daily  fluticasone propionate 50 MICROgram(s)/spray Nasal Spray 2 Spray(s) Both Nostrils <User Schedule>  hydrochlorothiazide 12.5 milliGRAM(s) Oral daily  lactated ringers. 1000 milliLiter(s) (50 mL/Hr) IV Continuous <Continuous>  levothyroxine 25 MICROGram(s) Oral daily  loratadine 10 milliGRAM(s) Oral daily  metoprolol succinate  milliGRAM(s) Oral daily  montelukast 10 milliGRAM(s) Oral at bedtime  pantoprazole    Tablet 40 milliGRAM(s) Oral before breakfast  predniSONE   Tablet 1 milliGRAM(s) Oral four times a day  senna 2 Tablet(s) Oral at bedtime  simvastatin 40 milliGRAM(s) Oral at bedtime  tamsulosin 0.4 milliGRAM(s) Oral at bedtime    MEDICATIONS  (PRN):  acetaminophen   Tablet .. 650 milliGRAM(s) Oral every 6 hours PRN Temp greater or equal to 38C (100.4F), Mild Pain (1 - 3)  HYDROmorphone  Injectable 0.5 milliGRAM(s) IV Push every 3 hours PRN Severe Pain (7 - 10)  ibuprofen  Tablet. 400 milliGRAM(s) Oral every 6 hours PRN Temp greater or equal to 38.5C (101.3F), Moderate Pain (4 - 6)        ROS: as above; all other systems reviewed and wnl      PHYSICAL EXAMINATION:  Vital Signs Last 24 Hrs  T(C): 37 (03 Jul 2020 12:51), Max: 37.3 (02 Jul 2020 16:45)  T(F): 98.6 (03 Jul 2020 12:51), Max: 99.1 (02 Jul 2020 16:45)  HR: 70 (03 Jul 2020 12:51) (55 - 70)  BP: 135/67 (03 Jul 2020 12:51) (127/56 - 150/72)  BP(mean): --  RR: 19 (03 Jul 2020 12:51) (14 - 19)  SpO2: 96% (03 Jul 2020 12:51) (96% - 100%)  CAPILLARY BLOOD GLUCOSE      POCT Blood Glucose.: 100 mg/dL (02 Jul 2020 14:48)      GENERAL: NAD  HEENT: mucous membranes dry  CHEST: respirations unlabored  HEART: HR s  ABDOMEN: soft, ND, NT   EXTREMITIES:  no edema b/l LEs, no calf tenderness  NEURO: awake, alert, interactive; moves all extremities      LABS:                        9.6    14.34 )-----------( 295      ( 03 Jul 2020 06:48 )             30.8     07-03    142  |  108  |  9   ----------------------------<  97  3.7   |  29  |  0.73    Ca    8.3<L>      03 Jul 2020 06:48  Mg     1.5     07-03    TPro  4.7<L>  /  Alb  1.9<L>  /  TBili  0.3  /  DBili  x   /  AST  50<H>  /  ALT  54  /  AlkPhos  161<H>  07-02    PT/INR - ( 02 Jul 2020 06:42 )   PT: 14.3 sec;   INR: 1.23 ratio                 RADIOLOGY & ADDITIONAL TESTS:      ASSESSMENT AND PLAN:  80y Male CC/F/U for: colon mass, anemia    HPI:  81 YO M with pmhx of CAD, HTN, HLD, prostate cancer (seed radiation 2009), MI (nov 2000, angioplasty w 3 stents to the right coronary artery), rheumatoid and psoriatic arthritis, plaque psoriasis, b/l hip replacements (left 1998, right 2002), C diff in 2014, urinary retention with self catheretization hypothyroidism, mild asthma, melanoma in situ presents for GIB. Pt states he noticed right red blood in his stools for 6 months. Pt has been feeling weakness, weight loss of 19 pounds since June 2019. Pt sent in by outpatient hematologist Dr. Bergeron given low Hgb outpatient. (6.8 on 6/24) Pt denies SOB or GEORGES, however feels weak when pushing heavy objects. Pt was scheduled with his GI doctor Dr. Mullen for outpatient colonoscopy in March 2020. However, colonoscopy canceled due to COVID19. Pt self catheterizes 5-6 times a day. Pt admits to burning prior to catheterization when his bladder is full. Pt denies fever, chills, hematemesis, hemoptysis, hematuria, melena, SOB, GEORGES, CP, abdominal pain.     ED vitals significant for temp 98 F, HR 79, /55, RR 16, 93% on room air. Pt's labs significant for +FOBT , wbc 13, Hgb 7.5, hct 25.2, 94% neutrophils, BUN 30, Cr 1.5, AST 95. Pt type and screen performed. CXR showed no acute abnormalities. Pt put on remote tele, made NPO, s/p pantoprazole 40 IV, famotidine 20 IV. 1 unit of pRBCs pending. EKG showed rate 69, NSR, LVH, LAD. (25 Jun 2020 13:46)        INTERVAL HPI/OVERNIGHT EVENTS:  Pt seen and examined at bedside - s/p colectomy yesterday.     Allergies/Intolerance: cephalexin (Urticaria)  clindamycin (Diarrhea)  erythromycin (Other)  lactose (Stomach Upset)  Methotrexate Sodium (Unknown)  penicillins (Hives)  sporalin (oxypsoralin/PUVA) (Other)  sulfa drugs (Rash)      MEDICATIONS  (STANDING):  enoxaparin Injectable 40 milliGRAM(s) SubCutaneous daily  ferrous    sulfate 325 milliGRAM(s) Oral daily  finasteride 5 milliGRAM(s) Oral daily  fluticasone propionate 50 MICROgram(s)/spray Nasal Spray 2 Spray(s) Both Nostrils <User Schedule>  hydrochlorothiazide 12.5 milliGRAM(s) Oral daily  lactated ringers. 1000 milliLiter(s) (50 mL/Hr) IV Continuous <Continuous>  levothyroxine 25 MICROGram(s) Oral daily  loratadine 10 milliGRAM(s) Oral daily  metoprolol succinate  milliGRAM(s) Oral daily  montelukast 10 milliGRAM(s) Oral at bedtime  pantoprazole    Tablet 40 milliGRAM(s) Oral before breakfast  predniSONE   Tablet 1 milliGRAM(s) Oral four times a day  senna 2 Tablet(s) Oral at bedtime  simvastatin 40 milliGRAM(s) Oral at bedtime  tamsulosin 0.4 milliGRAM(s) Oral at bedtime    MEDICATIONS  (PRN):  acetaminophen   Tablet .. 650 milliGRAM(s) Oral every 6 hours PRN Temp greater or equal to 38C (100.4F), Mild Pain (1 - 3)  HYDROmorphone  Injectable 0.5 milliGRAM(s) IV Push every 3 hours PRN Severe Pain (7 - 10)  ibuprofen  Tablet. 400 milliGRAM(s) Oral every 6 hours PRN Temp greater or equal to 38.5C (101.3F), Moderate Pain (4 - 6)        ROS: as above; all other systems reviewed and wnl      PHYSICAL EXAMINATION:  Vital Signs Last 24 Hrs  T(C): 37 (03 Jul 2020 12:51), Max: 37.3 (02 Jul 2020 16:45)  T(F): 98.6 (03 Jul 2020 12:51), Max: 99.1 (02 Jul 2020 16:45)  HR: 70 (03 Jul 2020 12:51) (55 - 70)  BP: 135/67 (03 Jul 2020 12:51) (127/56 - 150/72)  RR: 19 (03 Jul 2020 12:51) (14 - 19)  SpO2: 96% (03 Jul 2020 12:51) (96% - 100%)  CAPILLARY BLOOD GLUCOSE    POCT Blood Glucose.: 100 mg/dL (02 Jul 2020 14:48)      GENERAL: elderly male, NAD  HEENT: mucous membranes dry  CHEST: respirations unlabored  HEART: HR 70s  ABDOMEN: soft, ND; L-sided colostomy in place  : Perez in place  EXTREMITIES: no edema b/l LEs, no calf tenderness  NEURO: awake, alert, interactive; moves all extremities      LABS:                        9.6    14.34 )-----------( 295      ( 03 Jul 2020 06:48 )             30.8     07-03    142  |  108  |  9   ----------------------------<  97  3.7   |  29  |  0.73    Ca    8.3<L>      03 Jul 2020 06:48  Mg     1.5     07-03    TPro  4.7<L>  /  Alb  1.9<L>  /  TBili  0.3  /  DBili  x   /  AST  50<H>  /  ALT  54  /  AlkPhos  161<H>  07-02    PT/INR - ( 02 Jul 2020 06:42 )   PT: 14.3 sec;   INR: 1.23 ratio CC/F/U for: colon mass, anemia    HPI:  79 YO M with pmhx of CAD, HTN, HLD, prostate cancer (seed radiation 2009), MI (nov 2000, angioplasty w 3 stents to the right coronary artery), rheumatoid and psoriatic arthritis, plaque psoriasis, b/l hip replacements (left 1998, right 2002), C diff in 2014, urinary retention with self catheretization hypothyroidism, mild asthma, melanoma in situ presents for GIB. Pt states he noticed right red blood in his stools for 6 months. Pt has been feeling weakness, weight loss of 19 pounds since June 2019. Pt sent in by outpatient hematologist Dr. Bergeron given low Hgb outpatient. (6.8 on 6/24) Pt denies SOB or GEORGES, however feels weak when pushing heavy objects. Pt was scheduled with his GI doctor Dr. Mullen for outpatient colonoscopy in March 2020. However, colonoscopy canceled due to COVID19. Pt self catheterizes 5-6 times a day. Pt admits to burning prior to catheterization when his bladder is full. Pt denies fever, chills, hematemesis, hemoptysis, hematuria, melena, SOB, GEORGES, CP, abdominal pain.     ED vitals significant for temp 98 F, HR 79, /55, RR 16, 93% on room air. Pt's labs significant for +FOBT , wbc 13, Hgb 7.5, hct 25.2, 94% neutrophils, BUN 30, Cr 1.5, AST 95. Pt type and screen performed. CXR showed no acute abnormalities. Pt put on remote tele, made NPO, s/p pantoprazole 40 IV, famotidine 20 IV. 1 unit of pRBCs pending. EKG showed rate 69, NSR, LVH, LAD. (25 Jun 2020 13:46)        INTERVAL HPI/OVERNIGHT EVENTS:  Pt seen and examined at bedside - s/p colectomy/colostomy yesterday.     Allergies/Intolerance: cephalexin (Urticaria)  clindamycin (Diarrhea)  erythromycin (Other)  lactose (Stomach Upset)  Methotrexate Sodium (Unknown)  penicillins (Hives)  sporalin (oxypsoralin/PUVA) (Other)  sulfa drugs (Rash)      MEDICATIONS  (STANDING):  enoxaparin Injectable 40 milliGRAM(s) SubCutaneous daily  ferrous    sulfate 325 milliGRAM(s) Oral daily  finasteride 5 milliGRAM(s) Oral daily  fluticasone propionate 50 MICROgram(s)/spray Nasal Spray 2 Spray(s) Both Nostrils <User Schedule>  hydrochlorothiazide 12.5 milliGRAM(s) Oral daily  lactated ringers. 1000 milliLiter(s) (50 mL/Hr) IV Continuous <Continuous>  levothyroxine 25 MICROGram(s) Oral daily  loratadine 10 milliGRAM(s) Oral daily  metoprolol succinate  milliGRAM(s) Oral daily  montelukast 10 milliGRAM(s) Oral at bedtime  pantoprazole    Tablet 40 milliGRAM(s) Oral before breakfast  predniSONE   Tablet 1 milliGRAM(s) Oral four times a day  senna 2 Tablet(s) Oral at bedtime  simvastatin 40 milliGRAM(s) Oral at bedtime  tamsulosin 0.4 milliGRAM(s) Oral at bedtime    MEDICATIONS  (PRN):  acetaminophen   Tablet .. 650 milliGRAM(s) Oral every 6 hours PRN Temp greater or equal to 38C (100.4F), Mild Pain (1 - 3)  HYDROmorphone  Injectable 0.5 milliGRAM(s) IV Push every 3 hours PRN Severe Pain (7 - 10)  ibuprofen  Tablet. 400 milliGRAM(s) Oral every 6 hours PRN Temp greater or equal to 38.5C (101.3F), Moderate Pain (4 - 6)      ROS: as above; all other systems reviewed and wnl      PHYSICAL EXAMINATION:  Vital Signs Last 24 Hrs  T(C): 37 (03 Jul 2020 12:51), Max: 37.3 (02 Jul 2020 16:45)  T(F): 98.6 (03 Jul 2020 12:51), Max: 99.1 (02 Jul 2020 16:45)  HR: 70 (03 Jul 2020 12:51) (55 - 70)  BP: 135/67 (03 Jul 2020 12:51) (127/56 - 150/72)  RR: 19 (03 Jul 2020 12:51) (14 - 19)  SpO2: 96% (03 Jul 2020 12:51) (96% - 100%)  CAPILLARY BLOOD GLUCOSE    POCT Blood Glucose.: 100 mg/dL (02 Jul 2020 14:48)      GENERAL: elderly male, NAD  HEENT: mucous membranes dry  CHEST: respirations unlabored  HEART: HR 70s  ABDOMEN: soft, ND; L-sided colostomy in place  : Perez in place  EXTREMITIES: no edema b/l LEs, no calf tenderness  NEURO: awake, alert, interactive; moves all extremities      LABS:                        9.6    14.34 )-----------( 295      ( 03 Jul 2020 06:48 )             30.8     07-03    142  |  108  |  9   ----------------------------<  97  3.7   |  29  |  0.73    Ca    8.3<L>      03 Jul 2020 06:48  Mg     1.5     07-03    TPro  4.7<L>  /  Alb  1.9<L>  /  TBili  0.3  /  DBili  x   /  AST  50<H>  /  ALT  54  /  AlkPhos  161<H>  07-02    PT/INR - ( 02 Jul 2020 06:42 )   PT: 14.3 sec;   INR: 1.23 ratio

## 2020-07-03 NOTE — PROGRESS NOTE ADULT - SUBJECTIVE AND OBJECTIVE BOX
Patient reports feeling sore around ostomy - is hungry wants to eat today.    Chart reviewed.  Overnight events reviewed.      MEDICATIONS  (STANDING):  enoxaparin Injectable 40 milliGRAM(s) SubCutaneous daily  ferrous    sulfate 325 milliGRAM(s) Oral daily  finasteride 5 milliGRAM(s) Oral daily  fluticasone propionate 50 MICROgram(s)/spray Nasal Spray 2 Spray(s) Both Nostrils <User Schedule>  hydrochlorothiazide 12.5 milliGRAM(s) Oral daily  lactated ringers. 1000 milliLiter(s) (50 mL/Hr) IV Continuous <Continuous>  levothyroxine 25 MICROGram(s) Oral daily  loratadine 10 milliGRAM(s) Oral daily  metoprolol succinate  milliGRAM(s) Oral daily  montelukast 10 milliGRAM(s) Oral at bedtime  pantoprazole    Tablet 40 milliGRAM(s) Oral before breakfast  predniSONE   Tablet 1 milliGRAM(s) Oral four times a day  senna 2 Tablet(s) Oral at bedtime  simvastatin 40 milliGRAM(s) Oral at bedtime  tamsulosin 0.4 milliGRAM(s) Oral at bedtime    MEDICATIONS  (PRN):  acetaminophen   Tablet .. 650 milliGRAM(s) Oral every 6 hours PRN Temp greater or equal to 38C (100.4F), Mild Pain (1 - 3)  HYDROmorphone  Injectable 0.5 milliGRAM(s) IV Push every 3 hours PRN Severe Pain (7 - 10)  ibuprofen  Tablet. 400 milliGRAM(s) Oral every 6 hours PRN Temp greater or equal to 38.5C (101.3F), Moderate Pain (4 - 6)        Vital Signs Last 24 Hrs  T(C): 36.6 (03 Jul 2020 05:16), Max: 37.3 (02 Jul 2020 16:45)  T(F): 97.9 (03 Jul 2020 05:16), Max: 99.1 (02 Jul 2020 16:45)  HR: 62 (03 Jul 2020 05:16) (55 - 70)  BP: 150/72 (03 Jul 2020 05:16) (127/56 - 154/70)  BP(mean): --  RR: 18 (03 Jul 2020 05:16) (14 - 18)  SpO2: 99% (03 Jul 2020 05:16) (98% - 100%)    PHYSICAL EXAM:  Constitutional: Lying in bed  HEENT: NCAT, anicteric sclera, moist mucous membranes  Respiratory:  Decreased breath sounds in lower bases  Cardiovascular:  nl S1, S2  Gastrointestinal:  Soft, +BS, ND, colostomy site - pink, +tenderness around ostomy  Extremities:  no E/C/C  Neurological:  A&O x 3.  Skin:  no Jaundice  Psychiatric:  anxious appearing                     9.6    14.34 )-----------( 295      ( 03 Jul 2020 06:48 )             30.8       07-03    142  |  108  |  9   ----------------------------<  97  3.7   |  29  |  0.73    Ca    8.3<L>      03 Jul 2020 06:48  Mg     1.5     07-03    TPro  4.7<L>  /  Alb  1.9<L>  /  TBili  0.3  /  DBili  x   /  AST  50<H>  /  ALT  54  /  AlkPhos  161<H>  07-02

## 2020-07-03 NOTE — PROGRESS NOTE ADULT - ASSESSMENT
All as above.  Patient personally seen and examined.  Vitals non-suggestive.  Abdomen benign with appropriate incisional tenderness and viable though somewhat edematous stoma.  Labs essentially reassuring- WBCs most likely reactive.  As such, continue clears for now.  Will advance diet with evidence of flatus or GI function.  Reviewed with patient in detail.  He is pleased and aware of plan as outlined above.  Discussed with patient's spouse in detail.  I will be away until 7/13, Autumn Shepherd and Josselyn covering in interim.

## 2020-07-03 NOTE — PROGRESS NOTE ADULT - SUBJECTIVE AND OBJECTIVE BOX
Interval History:  s/p surgery.  complains of some abdominal pain  Chart reviewed and events noted;   Overnight events:    MEDICATIONS  (STANDING):  enoxaparin Injectable 40 milliGRAM(s) SubCutaneous daily  ferrous    sulfate 325 milliGRAM(s) Oral daily  finasteride 5 milliGRAM(s) Oral daily  fluticasone propionate 50 MICROgram(s)/spray Nasal Spray 2 Spray(s) Both Nostrils <User Schedule>  hydrochlorothiazide 12.5 milliGRAM(s) Oral daily  lactated ringers. 1000 milliLiter(s) (50 mL/Hr) IV Continuous <Continuous>  levothyroxine 25 MICROGram(s) Oral daily  loratadine 10 milliGRAM(s) Oral daily  metoprolol succinate  milliGRAM(s) Oral daily  montelukast 10 milliGRAM(s) Oral at bedtime  pantoprazole    Tablet 40 milliGRAM(s) Oral before breakfast  predniSONE   Tablet 1 milliGRAM(s) Oral four times a day  senna 2 Tablet(s) Oral at bedtime  simvastatin 40 milliGRAM(s) Oral at bedtime  tamsulosin 0.4 milliGRAM(s) Oral at bedtime    MEDICATIONS  (PRN):  acetaminophen   Tablet .. 650 milliGRAM(s) Oral every 6 hours PRN Temp greater or equal to 38C (100.4F), Mild Pain (1 - 3)  HYDROmorphone  Injectable 0.5 milliGRAM(s) IV Push every 3 hours PRN Severe Pain (7 - 10)  ibuprofen  Tablet. 400 milliGRAM(s) Oral every 6 hours PRN Temp greater or equal to 38.5C (101.3F), Moderate Pain (4 - 6)      Vital Signs Last 24 Hrs  T(C): 37 (03 Jul 2020 12:51), Max: 37 (03 Jul 2020 12:51)  T(F): 98.6 (03 Jul 2020 12:51), Max: 98.6 (03 Jul 2020 12:51)  HR: 70 (03 Jul 2020 12:51) (59 - 70)  BP: 135/67 (03 Jul 2020 12:51) (135/67 - 150/72)  BP(mean): --  RR: 19 (03 Jul 2020 12:51) (18 - 19)  SpO2: 96% (03 Jul 2020 12:51) (96% - 99%)    PHYSICAL EXAM  General: adult in NAD  HEENT: clear oropharynx, anicteric sclera, pink conjunctivae  Neck: supple  CV: normal S1S2 with no murmur rubs or gallops  Lungs: clear to auscultation, no wheezes, no rhales  Abdomen: soft non-tender non-distended, no hepato/splenomegaly. colostomy functioning  Ext: no clubbing cyanosis or edema  Skin: no rashes and no petichiae  Neuro: alert and oriented X3 no focal deficits      LABS:  CBC Full  -  ( 03 Jul 2020 06:48 )  WBC Count : 14.34 K/uL  RBC Count : 3.52 M/uL  Hemoglobin : 9.6 g/dL  Hematocrit : 30.8 %  Platelet Count - Automated : 295 K/uL  Mean Cell Volume : 87.5 fl  Mean Cell Hemoglobin : 27.3 pg  Mean Cell Hemoglobin Concentration : 31.2 gm/dL  Auto Neutrophil # : 12.45 K/uL  Auto Lymphocyte # : 0.52 K/uL  Auto Monocyte # : 1.23 K/uL  Auto Eosinophil # : 0.00 K/uL  Auto Basophil # : 0.02 K/uL  Auto Neutrophil % : 86.9 %  Auto Lymphocyte % : 3.6 %  Auto Monocyte % : 8.6 %  Auto Eosinophil % : 0.0 %  Auto Basophil % : 0.1 %    07-03    142  |  108  |  9   ----------------------------<  97  3.7   |  29  |  0.73    Ca    8.3<L>      03 Jul 2020 06:48  Mg     1.5     07-03    TPro  4.7<L>  /  Alb  1.9<L>  /  TBili  0.3  /  DBili  x   /  AST  50<H>  /  ALT  54  /  AlkPhos  161<H>  07-02    PT/INR - ( 02 Jul 2020 06:42 )   PT: 14.3 sec;   INR: 1.23 ratio             fe studies      WBC trend  14.34 K/uL (07-03-20 @ 06:48)  9.84 K/uL (07-02-20 @ 06:42)  13.65 K/uL (07-01-20 @ 06:16)      Hgb trend  9.6 g/dL (07-03-20 @ 06:48)  9.6 g/dL (07-02-20 @ 06:42)  9.5 g/dL (07-01-20 @ 06:16)      plt trend  295 K/uL (07-03-20 @ 06:48)  254 K/uL (07-02-20 @ 06:42)  269 K/uL (07-01-20 @ 06:16)        RADIOLOGY & ADDITIONAL STUDIES:

## 2020-07-03 NOTE — PROGRESS NOTE ADULT - SUBJECTIVE AND OBJECTIVE BOX
[facility] -- Noe Land, Jamel Sung Pannella, Patel, Savella  Office # [phone]      Follow Up:  Gi bleed, CAD    Subjective/Observations: Seen and examined.  Sitting up in bed eating with no new complaints.  Denies cp, sob or palpitations.  No events overnight. Pain controlled.  NAD.        REVIEW OF SYSTEMS: All other review of systems is negative unless indicated above    PAST MEDICAL & SURGICAL HISTORY:  Asthma  Melanoma in situ  Hypothyroid  H/O Clostridium difficile infection  Urinary retention  Plaque psoriasis  History of urinary self-catheterization  Arthritis  Prostate CA: seed therapy aug 2009  Hyperlipidemia  Hypertension  CAD (coronary artery disease)  Acute MI  S/P tonsillectomy  H/O hernia repair  S/P cardiac cath      MEDICATIONS  (STANDING):  enoxaparin Injectable 40 milliGRAM(s) SubCutaneous daily  ferrous    sulfate 325 milliGRAM(s) Oral daily  finasteride 5 milliGRAM(s) Oral daily  fluticasone propionate 50 MICROgram(s)/spray Nasal Spray 2 Spray(s) Both Nostrils <User Schedule>  hydrochlorothiazide 12.5 milliGRAM(s) Oral daily  lactated ringers. 1000 milliLiter(s) (50 mL/Hr) IV Continuous <Continuous>  levothyroxine 25 MICROGram(s) Oral daily  loratadine 10 milliGRAM(s) Oral daily  metoprolol succinate  milliGRAM(s) Oral daily  montelukast 10 milliGRAM(s) Oral at bedtime  pantoprazole    Tablet 40 milliGRAM(s) Oral before breakfast  predniSONE   Tablet 1 milliGRAM(s) Oral four times a day  senna 2 Tablet(s) Oral at bedtime  simvastatin 40 milliGRAM(s) Oral at bedtime  tamsulosin 0.4 milliGRAM(s) Oral at bedtime    MEDICATIONS  (PRN):  acetaminophen   Tablet .. 650 milliGRAM(s) Oral every 6 hours PRN Temp greater or equal to 38C (100.4F), Mild Pain (1 - 3)  HYDROmorphone  Injectable 0.5 milliGRAM(s) IV Push every 3 hours PRN Severe Pain (7 - 10)  ibuprofen  Tablet. 400 milliGRAM(s) Oral every 6 hours PRN Temp greater or equal to 38.5C (101.3F), Moderate Pain (4 - 6)      Allergies    cephalexin (Urticaria)  clindamycin (Diarrhea)  erythromycin (Other)  lactose (Stomach Upset)  Methotrexate Sodium (Unknown)  penicillins (Hives)  sporalin (oxypsoralin/PUVA) (Other)  sulfa drugs (Rash)    Intolerances            Vital Signs Last 24 Hrs  T(C): 36.6 (03 Jul 2020 05:16), Max: 37.3 (02 Jul 2020 16:45)  T(F): 97.9 (03 Jul 2020 05:16), Max: 99.1 (02 Jul 2020 16:45)  HR: 62 (03 Jul 2020 05:16) (55 - 68)  BP: 150/72 (03 Jul 2020 05:16) (127/56 - 154/70)  BP(mean): --  RR: 18 (03 Jul 2020 05:16) (14 - 18)  SpO2: 99% (03 Jul 2020 05:16) (98% - 100%)    I&O's Summary    02 Jul 2020 07:01  -  03 Jul 2020 07:00  --------------------------------------------------------  IN: 650 mL / OUT: 1150 mL / NET: -500 mL          PHYSICAL EXAM:  TELE: NSR 60s with PACs  Constitutional: NAD, awake and alert, well-developed  HEENT: Moist Mucous Membranes, Anicteric  Pulmonary: Non-labored, breath sounds are decreased in bases bilaterally, No wheezing, rales or rhonchi  Cardiovascular: Regular, S1 and S2, No murmurs, rubs, gallops or clicks  Gastrointestinal: Bowel Sounds are diminished abdomen flat, soft, nontender.  Colostomy present with beefy red stoma bag flat.   Lymph: No peripheral edema. No lymphadenopathy.  Skin: No visible rashes or ulcers.  Psych:  Mood & affect appropriate    LABS: All Labs Reviewed:                        9.6    14.34 )-----------( 295      ( 03 Jul 2020 06:48 )             30.8                         9.6    9.84  )-----------( 254      ( 02 Jul 2020 06:42 )             30.4                         9.5    13.65 )-----------( 269      ( 01 Jul 2020 06:16 )             30.2     03 Jul 2020 06:48    142    |  108    |  9      ----------------------------<  97     3.7     |  29     |  0.73   02 Jul 2020 10:41    x      |  x      |  x      ----------------------------<  x      3.2     |  x      |  x      02 Jul 2020 06:42    145    |  112    |  6      ----------------------------<  79     2.7     |  28     |  0.60     Ca    8.3        03 Jul 2020 06:48  Ca    8.1        02 Jul 2020 06:42  Ca    8.3        01 Jul 2020 06:16  Mg     1.5       03 Jul 2020 06:48    TPro  4.7    /  Alb  1.9    /  TBili  0.3    /  DBili  x      /  AST  50     /  ALT  54     /  AlkPhos  161    02 Jul 2020 06:42    PT/INR - ( 02 Jul 2020 06:42 )   PT: 14.3 sec;   INR: 1.23 ratio      < from: 12 Lead ECG (06.25.20 @ 12:09) >  Ventricular Rate 69 BPM    Atrial Rate 69 BPM    P-R Interval 134 ms    QRS Duration 90 ms    Q-T Interval 440 ms    QTC Calculation(Bezet) 471 ms    P Axis 61 degrees    R Axis -31 degrees    T Axis 133 degrees    Diagnosis Line Normal sinus rhythm  Left axis deviation  Left ventricular hypertrophy with repolarization abnormality  Inferior infarct (cited on or before 29-MAR-2010)  Nonspecific ST abnormality  Abnormal ECG  Confirmed by francois Kaplan ([id number]) on 6/25/2020 2:14:26 PM    < end of copied text >    < from: CT Chest w/ Oral Cont and w/ IV Cont (06.29.20 @ 16:59) >  EXAM:  CT ABDOMEN AND PELVIS OC IC                          EXAM:  CT CHEST OC IC                            PROCEDURE DATE:  06/29/2020          INTERPRETATION:  CLINICAL INFORMATION: Anemia. Obstructing sigmoid mass on colonoscopy.  Gastric polyps.    COMPARISON: None available.    PROCEDURE:   CT of the Chest, Abdomen and Pelvis was performed with intravenous contrast.   Intravenous contrast: 90 ml Omnipaque 350. 10 ml discarded.  Oral contrast: None.  Sagittal and coronal reformats were performed.    FINDINGS:     CHEST:    LUNGS AND LARGE AIRWAYS: PLEURA:   There are small bilateral pleural effusions with underlying compressive atelectasis.  The central airways remain patent.    VESSELS: Atherosclerotic calcification of thoracic aorta and coronary arteries.    HEART: Heart size is normal. No pericardial effusion.    MEDIASTINUM AND SOURAV: No lymphadenopathy.    CHEST WALL AND LOWER NECK: Within normal limits.    ABDOMEN AND PELVIS:    LIVER: There is a large conglomerate heterogeneous mass replacing the majority of the right hepatic lobe liver parenchyma. There is nodular contour of the liver and several low density lesions throughout the left hepatic lobe. Findings are suggestive of diffuse metastatic disease.  BILE DUCTS: Normal caliber.  GALLBLADDER: 6 mm polypoid density projecting within the lumen of the gallbladder could reflect polyp or gallstone. No gallbladder wall thickening.  SPLEEN: Within normal limits.  Probable accessory splenules.  PANCREAS: Within normal limits.  ADRENALS: Within normal limits.  KIDNEYS/URETERS: Large cyst upper pole left kidney. Smaller indeterminate hypodense bilateral renal lesions.    Bilateral extrarenal pelvises.    Evaluation of the pelvis is limited due to extensive metallic streak artifact related to patient's bilateral hip arthroplasties.  BLADDER: Limited evaluation.  Possible left-sided bladder diverticulum.  REPRODUCTIVE ORGANS: Limited evaluation. Radiation seed implants are present.    BOWEL: Evaluation of the rectosigmoid colon limited due to metallic streak artifact.  Sigmoid diverticulosis with segmental bowel wall thickening.  The descending colon is underdistended.  No bowel obstruction noted.    PERITONEUM: There is small to moderate free fluid within the pelvis.  There is mild nodularity along the peritoneal surface, example, image 77, series 2.    VESSELS: Atherosclerotic calcification.  RETROPERITONEUM/LYMPH NODES: No enlarged retroperitoneal lymphadenopathy.      ABDOMINAL WALL: Within normal limits.    BONES: Mottled lytic and sclerotic appearance bilateral posterior iliac bones with scattered sclerotic foci within the pelvis; and T6 and T7 vertebral bodies; findings suspicious for osseous metastatic disease.    IMPRESSION:     Small bilateral pleural effusions and underlying compressive atelectasis.    CT findings suggestive of diffuse hepatic metastatic disease.    Small to moderate pelvic ascites.  Mild peritoneal surface nodularity.  Findings may reflect peritoneal carcinomatosis.    Findings suspicious for osseous metastatic disease as discussed.    Recommend further evaluation with PET/CT scan.    Other findings as discussed above.                ALFRED WASHINGTON M.D., ATTENDING RADIOLOGIST  This document has been electronically signed. Jun 29 2020  5:58PM          < end of copied text >     < from: CT Abdomen and Pelvis w/ Oral Cont and w/ IV Cont (06.29.20 @ 16:59) >  EXAM:  CT ABDOMEN AND PELVIS OC IC                          EXAM:  CT CHEST OC IC                            PROCEDURE DATE:  06/29/2020          INTERPRETATION:  CLINICAL INFORMATION: Anemia. Obstructing sigmoid mass on colonoscopy.  Gastric polyps.    COMPARISON: None available.    PROCEDURE:   CT of the Chest, Abdomen and Pelvis was performed with intravenous contrast.   Intravenous contrast: 90 ml Omnipaque 350. 10 ml discarded.  Oral contrast: None.  Sagittal and coronal reformats were performed.    FINDINGS:     CHEST:    LUNGS AND LARGE AIRWAYS: PLEURA:   There are small bilateral pleural effusions with underlying compressive atelectasis.  The central airways remain patent.    VESSELS: Atherosclerotic calcification of thoracic aorta and coronary arteries.    HEART: Heart size is normal. No pericardial effusion.    MEDIASTINUM AND SOURAV: No lymphadenopathy.    CHEST WALL AND LOWER NECK: Within normal limits.    ABDOMEN AND PELVIS:    LIVER: There is a large conglomerate heterogeneous mass replacing the majority of the right hepatic lobe liver parenchyma. There is nodular contour of the liver and several low density lesions throughout the left hepatic lobe. Findings are suggestive of diffuse metastatic disease.  BILE DUCTS: Normal caliber.  GALLBLADDER: 6 mm polypoid density projecting within the lumen of the gallbladder could reflect polyp or gallstone. No gallbladder wall thickening.  SPLEEN: Within normal limits.  Probable accessory splenules.  PANCREAS: Within normal limits.  ADRENALS: Within normal limits.  KIDNEYS/URETERS: Large cyst upper pole left kidney. Smaller indeterminate hypodense bilateral renal lesions.    Bilateral extrarenal pelvises.    Evaluation of the pelvis is limited due to extensive metallic streak artifact related to patient's bilateral hip arthroplasties.  BLADDER: Limited evaluation.  Possible left-sided bladder diverticulum.  REPRODUCTIVE ORGANS: Limited evaluation. Radiation seed implants are present.    BOWEL: Evaluation of the rectosigmoid colon limited due to metallic streak artifact.  Sigmoid diverticulosis with segmental bowel wall thickening.  The descending colon is underdistended.  No bowel obstruction noted.    PERITONEUM: There is small to moderate free fluid within the pelvis.  There is mild nodularity along the peritoneal surface, example, image 77, series 2.    VESSELS: Atherosclerotic calcification.  RETROPERITONEUM/LYMPH NODES: No enlarged retroperitoneal lymphadenopathy.      ABDOMINAL WALL: Within normal limits.    BONES: Mottled lytic and sclerotic appearance bilateral posterior iliac bones with scattered sclerotic foci within the pelvis; and T6 and T7 vertebral bodies; findings suspicious for osseous metastatic disease.    IMPRESSION:     Small bilateral pleural effusions and underlying compressive atelectasis.    CT findings suggestive of diffuse hepatic metastatic disease.    Small to moderate pelvic ascites.  Mild peritoneal surface nodularity.  Findings may reflect peritoneal carcinomatosis.    Findings suspicious for osseous metastatic disease as discussed.    Recommend further evaluation with PET/CT scan.    Other findings as discussed above.                ALFRED WASHINGTON M.D., ATTENDING RADIOLOGIST  This document has been electronically signed. Jun 29 2020  5:58PM          < end of copied text >

## 2020-07-03 NOTE — PROGRESS NOTE ADULT - ASSESSMENT
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  -seen by surgery, for palliative colon resection   -discussed w pt, who is aware of significance of findings.    colon resection scheduled for today 7/2/2020    RECOMMEND:   1.  continue post op care  2.  follow CBC and transfuse as needed  3.  check pathology and will need biomarker studies  4.  further heme onc recommendations pending above  discussed with patient, Dr. Grullon and Dr. Ross

## 2020-07-03 NOTE — PROGRESS NOTE ADULT - SUBJECTIVE AND OBJECTIVE BOX
Date/Time Patient Seen:  		  Referring MD:   Data Reviewed	       Patient is a 80y old  Male who presents with a chief complaint of GI bleeding (02 Jul 2020 14:56)      Subjective/HPI     PAST MEDICAL & SURGICAL HISTORY:  Asthma  Melanoma in situ  Hypothyroid  H/O Clostridium difficile infection  Urinary retention  Plaque psoriasis  History of urinary self-catheterization  Arthritis  Prostate CA: seed therapy aug 2009  Hyperlipidemia  Hypertension  CAD (coronary artery disease)  Acute MI  S/P tonsillectomy  H/O hernia repair  S/P cardiac cath  No significant past surgical history        Medication list         MEDICATIONS  (STANDING):  enoxaparin Injectable 40 milliGRAM(s) SubCutaneous daily  ferrous    sulfate 325 milliGRAM(s) Oral daily  finasteride 5 milliGRAM(s) Oral daily  fluticasone propionate 50 MICROgram(s)/spray Nasal Spray 2 Spray(s) Both Nostrils <User Schedule>  hydrochlorothiazide 12.5 milliGRAM(s) Oral daily  lactated ringers. 1000 milliLiter(s) (50 mL/Hr) IV Continuous <Continuous>  levothyroxine 25 MICROGram(s) Oral daily  loratadine 10 milliGRAM(s) Oral daily  metoprolol succinate  milliGRAM(s) Oral daily  montelukast 10 milliGRAM(s) Oral at bedtime  pantoprazole    Tablet 40 milliGRAM(s) Oral before breakfast  predniSONE   Tablet 1 milliGRAM(s) Oral four times a day  senna 2 Tablet(s) Oral at bedtime  simvastatin 40 milliGRAM(s) Oral at bedtime  tamsulosin 0.4 milliGRAM(s) Oral at bedtime    MEDICATIONS  (PRN):  acetaminophen   Tablet .. 650 milliGRAM(s) Oral every 6 hours PRN Temp greater or equal to 38C (100.4F), Mild Pain (1 - 3)  HYDROmorphone  Injectable 0.5 milliGRAM(s) IV Push every 3 hours PRN Severe Pain (7 - 10)  ibuprofen  Tablet. 400 milliGRAM(s) Oral every 6 hours PRN Temp greater or equal to 38.5C (101.3F), Moderate Pain (4 - 6)         Vitals log        ICU Vital Signs Last 24 Hrs  T(C): 36.6 (03 Jul 2020 05:16), Max: 37.3 (02 Jul 2020 16:45)  T(F): 97.9 (03 Jul 2020 05:16), Max: 99.1 (02 Jul 2020 16:45)  HR: 62 (03 Jul 2020 05:16) (55 - 70)  BP: 150/72 (03 Jul 2020 05:16) (127/56 - 154/70)  BP(mean): --  ABP: --  ABP(mean): --  RR: 18 (03 Jul 2020 05:16) (14 - 18)  SpO2: 99% (03 Jul 2020 05:16) (98% - 100%)           Input and Output:  I&O's Detail    01 Jul 2020 07:01  -  02 Jul 2020 07:00  --------------------------------------------------------  IN:    lactated ringers.: 400 mL    Oral Fluid: 500 mL  Total IN: 900 mL    OUT:    Ureteral Catheter: 2300 mL  Total OUT: 2300 mL    Total NET: -1400 mL      02 Jul 2020 07:01  -  03 Jul 2020 06:32  --------------------------------------------------------  IN:    lactated ringers.: 250 mL  Total IN: 250 mL    OUT:    Indwelling Catheter - Urethral: 100 mL    Ureteral Catheter: 500 mL  Total OUT: 600 mL    Total NET: -350 mL          Lab Data                        9.6    9.84  )-----------( 254      ( 02 Jul 2020 06:42 )             30.4     07-02    x   |  x   |  x   ----------------------------<  x   3.2<L>   |  x   |  x     Ca    8.1<L>      02 Jul 2020 06:42    TPro  4.7<L>  /  Alb  1.9<L>  /  TBili  0.3  /  DBili  x   /  AST  50<H>  /  ALT  54  /  AlkPhos  161<H>  07-02            Review of Systems	      Objective     Physical Examination    heart s1s2  lung dec BS  abd dec BS  awake  verbal  on o2 support      Pertinent Lab findings & Imaging      Ana:  NO   Adequate UO     I&O's Detail    01 Jul 2020 07:01  -  02 Jul 2020 07:00  --------------------------------------------------------  IN:    lactated ringers.: 400 mL    Oral Fluid: 500 mL  Total IN: 900 mL    OUT:    Ureteral Catheter: 2300 mL  Total OUT: 2300 mL    Total NET: -1400 mL      02 Jul 2020 07:01  -  03 Jul 2020 06:32  --------------------------------------------------------  IN:    lactated ringers.: 250 mL  Total IN: 250 mL    OUT:    Indwelling Catheter - Urethral: 100 mL    Ureteral Catheter: 500 mL  Total OUT: 600 mL    Total NET: -350 mL               Discussed with:     Cultures:	        Radiology

## 2020-07-03 NOTE — PROGRESS NOTE ADULT - SUBJECTIVE AND OBJECTIVE BOX
Patient is a 80y old  Male who presents with a chief complaint of  GI bleeding    HPI: Hypotonic Bladder, HTN, HLD, Prostate Cancer presents with GI bleeding and fatigue/weakness.  Patient has been having ongoing issues with GI bleeding and was scheduled to have oupt endoscopy, however was delayed due to covid 19.  He presents with Fatigue.  He chronically straight caths himself due to urinary retention for 12 years and has followed with urology.  He states he has been straight cathing with normal volume, but has been having more frequency.  He denies dysuria/SOB/CP/Dizziness/N/V.  Saw Dr. Carrasco-nephrologist sometime ago.     Follow up Acute on CKD Stage 2  No new complaints    PAST MEDICAL & SURGICAL HISTORY:  Prostate CA  Hyperlipidemia  Hypertension  CAD (coronary artery disease)  Acute MI  No significant past surgical history       FAMILY HISTORY:  NC    Social History:Non smoker    MEDICATIONS  (STANDING):  pantoprazole  Injectable 40 milliGRAM(s) IV Push Once  pantoprazole  Injectable 40 milliGRAM(s) IV Push two times a day    MEDICATIONS  (PRN):   Meds reviewed    Allergies    clindamycin (Diarrhea)  penicillins (Hives)  sulfa drugs (Rash)    Intolerances         REVIEW OF SYSTEMS:    CONSTITUTIONAL:  neg  EYES: No eye pain, visual disturbances, or discharge  ENMT:  No difficulty hearing, tinnitus, vertigo; No sinus or throat pain  NECK: No pain or stiffness  BREASTS: No pain, masses, or nipple discharge  RESPIRATORY: No SOB. No wheeze. No GEORGES  CARDIOVASCULAR: No chest pain, palpitations, dizziness,   GASTROINTESTINAL: No abdominal or epigastric pain. No nausea, vomiting, or hematemesis; No diarrhea or constipation.   GENITOURINARY: No dysuria, frequency, hematuria, or incontinence  NEUROLOGICAL: No headaches, memory loss, loss of strength, numbness, or tremors  SKIN: no Diffuse erythema, no blisters  LYMPH NODES: No enlarged glands  ENDOCRINE: No heat or cold intolerance; No hair loss  MUSCULOSKELETAL: No joint pain or swelling   PSYCHIATRIC: No depression, anxiety, mood swings, or difficulty sleeping  HEME/LYMPH: No easy bruising, or bleeding gums  ALLERGY AND IMMUNOLOGIC: No hives or eczema      ICU Vital Signs Last 24 Hrs  T(C): 37.1 (02 Jul 2020 11:58), Max: 37.1 (02 Jul 2020 06:49)  T(F): 98.8 (02 Jul 2020 11:58), Max: 98.8 (02 Jul 2020 06:49)  HR: 68 (02 Jul 2020 11:58) (68 - 75)  BP: 154/70 (02 Jul 2020 11:58) (124/69 - 154/70)  BP(mean): --  ABP: --  ABP(mean): --  RR: 16 (02 Jul 2020 11:58) (16 - 18)  SpO2: 99% (02 Jul 2020 11:58) (92% - 99%)        PHYSICAL EXAM:    GENERAL: No acute distress  HEAD:  Atraumatic, Normocephalic  EYES: Conjunctiva and sclera clear  ENMT: No tonsillar erythema, exudates, or enlargement; Moist mucous membranes, Good dentition, No lesions  NECK: Supple, neck  veins full  NERVOUS SYSTEM:  Alert & Oriented X3, Good concentration; Motor Strength wnl upper and lower extremities  CHEST/LUNG: Clear to percussion bilaterally; No rales, rhonchi, wheezing, or rubs  HEART: Regular rate and rhythm; No murmurs, rubs, or gallops  ABDOMEN: Soft, Nontender, Nondistended; Bowel sounds present,  EXTREMITIES:  No Edema  SKIN: No rashes or lesions, pale      LABS:                        9.6    9.84  )-----------( 254      ( 02 Jul 2020 06:42 )             30.4     07-02    x   |  x   |  x   ----------------------------<  x   3.2<L>   |  x   |  x     Ca    8.1<L>      02 Jul 2020 06:42    TPro  4.7<L>  /  Alb  1.9<L>  /  TBili  0.3  /  DBili  x   /  AST  50<H>  /  ALT  54  /  AlkPhos  161<H>  07-02    PT/INR - ( 02 Jul 2020 06:42 )   PT: 14.3 sec;   INR: 1.23 ratio Patient is a 80y old  Male who presents with a chief complaint of  GI bleeding    HPI: Hypotonic Bladder, HTN, HLD, Prostate Cancer presents with GI bleeding and fatigue/weakness.  Patient has been having ongoing issues with GI bleeding and was scheduled to have oupt endoscopy, however was delayed due to covid 19.  He presents with Fatigue.  He chronically straight caths himself due to urinary retention for 12 years and has followed with urology.  He states he has been straight cathing with normal volume, but has been having more frequency.  He denies dysuria/SOB/CP/Dizziness/N/V.  Saw Dr. Carrasco-nephrologist sometime ago.     Follow up Acute on CKD Stage 2  No new complaints    PAST MEDICAL & SURGICAL HISTORY:  Prostate CA  Hyperlipidemia  Hypertension  CAD (coronary artery disease)  Acute MI  No significant past surgical history       FAMILY HISTORY:  NC    Social History:Non smoker    MEDICATIONS  (STANDING):  pantoprazole  Injectable 40 milliGRAM(s) IV Push Once  pantoprazole  Injectable 40 milliGRAM(s) IV Push two times a day    MEDICATIONS  (PRN):   Meds reviewed    Allergies    clindamycin (Diarrhea)  penicillins (Hives)  sulfa drugs (Rash)    Intolerances         REVIEW OF SYSTEMS:    CONSTITUTIONAL:  neg  EYES: No eye pain, visual disturbances, or discharge  ENMT:  No difficulty hearing, tinnitus, vertigo; No sinus or throat pain  NECK: No pain or stiffness  BREASTS: No pain, masses, or nipple discharge  RESPIRATORY: No SOB. No wheeze. No GEORGES  CARDIOVASCULAR: No chest pain, palpitations, dizziness,   GASTROINTESTINAL: No abdominal or epigastric pain. No nausea, vomiting, or hematemesis; No diarrhea or constipation.   GENITOURINARY: No dysuria, frequency, hematuria, or incontinence  NEUROLOGICAL: No headaches, memory loss, loss of strength, numbness, or tremors  SKIN: no Diffuse erythema, no blisters  LYMPH NODES: No enlarged glands  ENDOCRINE: No heat or cold intolerance; No hair loss  MUSCULOSKELETAL: No joint pain or swelling   PSYCHIATRIC: No depression, anxiety, mood swings, or difficulty sleeping  HEME/LYMPH: No easy bruising, or bleeding gums  ALLERGY AND IMMUNOLOGIC: No hives or eczema      Vital Signs Last 24 Hrs  T(C): 37 (03 Jul 2020 12:51), Max: 37.3 (02 Jul 2020 16:45)  T(F): 98.6 (03 Jul 2020 12:51), Max: 99.1 (02 Jul 2020 16:45)  HR: 70 (03 Jul 2020 12:51) (55 - 70)  BP: 135/67 (03 Jul 2020 12:51) (127/56 - 150/72)  BP(mean): --  RR: 19 (03 Jul 2020 12:51) (14 - 19)  SpO2: 96% (03 Jul 2020 12:51) (96% - 100%)      PHYSICAL EXAM:    GENERAL: No acute distress  HEAD:  Atraumatic, Normocephalic  EYES: Conjunctiva and sclera clear  ENMT: No tonsillar erythema, exudates, or enlargement; Moist mucous membranes, Good dentition, No lesions  NECK: Supple, neck  veins full  NERVOUS SYSTEM:  Alert & Oriented X3, Good concentration; Motor Strength wnl upper and lower extremities  CHEST/LUNG: Clear to percussion bilaterally; No rales, rhonchi, wheezing, or rubs  HEART: Regular rate and rhythm; No murmurs, rubs, or gallops  ABDOMEN: Soft, Nontender, Nondistended; Bowel sounds present,  EXTREMITIES:  No Edema  SKIN: No rashes or lesions, pale      LABS:                    reviewed

## 2020-07-04 NOTE — PROGRESS NOTE ADULT - SUBJECTIVE AND OBJECTIVE BOX
JOSE CRUZ TIERNEY  [id number]    GENERAL SURGERY    NO FEVER, CHILLS, N/V  TOLERATING CLEAR LIQUID DIET  SOME INCISIONAL PAIN    MEDICATIONS  (STANDING):  enoxaparin Injectable 40 milliGRAM(s) SubCutaneous daily  ferrous    sulfate 325 milliGRAM(s) Oral daily  finasteride 5 milliGRAM(s) Oral daily  fluticasone propionate 50 MICROgram(s)/spray Nasal Spray 2 Spray(s) Both Nostrils <User Schedule>  hydrochlorothiazide 12.5 milliGRAM(s) Oral daily  lactated ringers. 1000 milliLiter(s) (50 mL/Hr) IV Continuous <Continuous>  levothyroxine 25 MICROGram(s) Oral daily  loratadine 10 milliGRAM(s) Oral daily  metoprolol succinate  milliGRAM(s) Oral daily  montelukast 10 milliGRAM(s) Oral at bedtime  pantoprazole    Tablet 40 milliGRAM(s) Oral before breakfast  potassium chloride  10 mEq/100 mL IVPB 10 milliEquivalent(s) IV Intermittent every 1 hour  predniSONE   Tablet 1 milliGRAM(s) Oral four times a day  senna 2 Tablet(s) Oral at bedtime  simvastatin 40 milliGRAM(s) Oral at bedtime  tamsulosin 0.4 milliGRAM(s) Oral at bedtime    MEDICATIONS  (PRN):  acetaminophen   Tablet .. 650 milliGRAM(s) Oral every 6 hours PRN Temp greater or equal to 38C (100.4F), Mild Pain (1 - 3)  HYDROmorphone  Injectable 0.5 milliGRAM(s) IV Push every 3 hours PRN Severe Pain (7 - 10)  ibuprofen  Tablet. 400 milliGRAM(s) Oral every 6 hours PRN Temp greater or equal to 38.5C (101.3F), Moderate Pain (4 - 6)      Vital Signs Last 24 Hrs  T(C): 37.1 (04 Jul 2020 04:57), Max: 37.7 (03 Jul 2020 21:41)  T(F): 98.8 (04 Jul 2020 04:57), Max: 99.8 (03 Jul 2020 21:41)  HR: 62 (04 Jul 2020 04:57) (62 - 74)  BP: 146/72 (04 Jul 2020 04:57) (135/67 - 155/72)  BP(mean): --  RR: 18 (04 Jul 2020 04:57) (18 - 19)  SpO2: 97% (04 Jul 2020 04:57) (93% - 97%)      07-03-20 @ 07:01  -  07-04-20 @ 07:00  --------------------------------------------------------  IN: 550 mL / OUT: 1200 mL / NET: -650 mL    07-04-20 @ 07:01  -  07-04-20 @ 09:19  --------------------------------------------------------  IN: 240 mL / OUT: 0 mL / NET: 240 mL    POTTER CATH 1200 ML CLEAR URINE     POD # 2      LUNGS: CLEAR TO AUSCULTATION , NO W/R/R  ABDOMEN: UMBILICAL AND ALL TROCAR SITES ARE DRY AND INTACT. COLOSTOMY IN PLACE EDEMATOUS, VIABLE, NO AIR/ STOOL IN THE  BAG. + BS, SOFT, NON DISTENDED, SOME INCISIONAL TENDERNESS                      EXTREMITY: NO EDEMA, NO CALF TENDERNESS      : POTTER CATH IN PLACE                         10.2   14.28 )-----------( 289      ( 04 Jul 2020 08:22 )             32.1      07-04    141  |  108  |  10  ----------------------------<  81  2.8<LL>   |  27  |  0.62    Ca    8.2<L>      04 Jul 2020 08:22  Mg     1.7     07-04                            ASSESSMENT &  PLAN:     POD # 2 S/P LAPAROSCOPIC NASRIN   LEUKOCYTOSIS  HYPOKALEMIA    ADVANCE DIET TO FULL LIQUID   WBC NOTED, OFF ANTIBIOTICS , F/U CBC IN AM   POTASSIUM SUPPLEMENTS  UROLOGY CONSULT NOTED , MAINTAIN POTTER CATH FOR NOW   PULMONARY, MEDICAL, HEM/ONC GI F/U NOTED

## 2020-07-04 NOTE — PROGRESS NOTE ADULT - SUBJECTIVE AND OBJECTIVE BOX
NP Progress Note     [facility] -- Noe Land, Ana Lilia, Jamel, Yuri Ruvalcaba Savella  Office # [phone]      Follow Up: GI Bleed/ CAD     HPI:  81 YO M with pmhx of CAD, HTN, HLD, prostate cancer (seed radiation 2009), MI (nov 2000, angioplasty w 3 stents to the right coronary artery), rheumatoid and psoriatic arthritis, plaque psoriasis, b/l hip replacements (left 1998, right 2002), C diff in 2014, urinary retention with self catheretization hypothyroidism, mild asthma, melanoma in situ presents for GIB. Pt states he noticed right red blood in his stools for 6 months. Pt has been feeling weakness, weight loss of 19 pounds since June 2019. Pt sent in by outpatient hematologist Dr. Bergeron given low Hgb outpatient. (6.8 on 6/24) Pt denies SOB or GEORGES, however feels weak when pushing heavy objects. Pt was scheduled with his GI doctor Dr. Mullen for outpatient colonoscopy in March 2020. However, colonoscopy canceled due to COVID19. Pt self catheterizes 5-6 times a day. Pt admits to burning prior to catheterization when his bladder is full. Pt denies fever, chills, hematemesis, hemoptysis, hematuria, melena, SOB, GEORGES, CP, abdominal pain.     ED vitals significant for temp 98 F, HR 79, /55, RR 16, 93% on room air. Pt's labs significant for +FOBT , wbc 13, Hgb 7.5, hct 25.2, 94% neutrophils, BUN 30, Cr 1.5, AST 95. Pt type and screen performed. CXR showed no acute abnormalities. Pt put on remote tele, made NPO, s/p pantoprazole 40 IV, famotidine 20 IV. 1 unit of pRBCs pending. EKG showed rate 69, NSR, LVH, LAD. (25 Jun 2020 13:46)        Subjective/Observations: Pt. seen and examined and evaluated. Pt. resting comfortably in bed in NAD, with no respiratory distress, no chest pain, dyspnea, palpitations, PND, or orthopnea.      REVIEW OF SYSTEMS: All other review of systems is negative unless indicated above    PAST MEDICAL & SURGICAL HISTORY:  Asthma  Melanoma in situ  Hypothyroid  H/O Clostridium difficile infection  Urinary retention  Plaque psoriasis  History of urinary self-catheterization  Arthritis  Prostate CA: seed therapy aug 2009  Hyperlipidemia  Hypertension  CAD (coronary artery disease)  Acute MI  S/P tonsillectomy  H/O hernia repair  S/P cardiac cath      MEDICATIONS  (STANDING):  enoxaparin Injectable 40 milliGRAM(s) SubCutaneous daily  ferrous    sulfate 325 milliGRAM(s) Oral daily  finasteride 5 milliGRAM(s) Oral daily  fluticasone propionate 50 MICROgram(s)/spray Nasal Spray 2 Spray(s) Both Nostrils <User Schedule>  hydrochlorothiazide 12.5 milliGRAM(s) Oral daily  lactated ringers. 1000 milliLiter(s) (50 mL/Hr) IV Continuous <Continuous>  levothyroxine 25 MICROGram(s) Oral daily  loratadine 10 milliGRAM(s) Oral daily  metoprolol succinate  milliGRAM(s) Oral daily  montelukast 10 milliGRAM(s) Oral at bedtime  pantoprazole    Tablet 40 milliGRAM(s) Oral before breakfast  potassium chloride  10 mEq/100 mL IVPB 10 milliEquivalent(s) IV Intermittent every 1 hour  predniSONE   Tablet 1 milliGRAM(s) Oral four times a day  senna 2 Tablet(s) Oral at bedtime  simvastatin 40 milliGRAM(s) Oral at bedtime  tamsulosin 0.4 milliGRAM(s) Oral at bedtime    MEDICATIONS  (PRN):  acetaminophen   Tablet .. 650 milliGRAM(s) Oral every 6 hours PRN Temp greater or equal to 38C (100.4F), Mild Pain (1 - 3)  HYDROmorphone  Injectable 0.5 milliGRAM(s) IV Push every 3 hours PRN Severe Pain (7 - 10)  ibuprofen  Tablet. 400 milliGRAM(s) Oral every 6 hours PRN Temp greater or equal to 38.5C (101.3F), Moderate Pain (4 - 6)      Allergies    cephalexin (Urticaria)  clindamycin (Diarrhea)  erythromycin (Other)  lactose (Stomach Upset)  Methotrexate Sodium (Unknown)  penicillins (Hives)  sporalin (oxypsoralin/PUVA) (Other)  sulfa drugs (Rash)    Intolerances          Vital Signs Last 24 Hrs  T(C): 37.1 (04 Jul 2020 04:57), Max: 37.7 (03 Jul 2020 21:41)  T(F): 98.8 (04 Jul 2020 04:57), Max: 99.8 (03 Jul 2020 21:41)  HR: 62 (04 Jul 2020 04:57) (62 - 74)  BP: 146/72 (04 Jul 2020 04:57) (135/67 - 155/72)  BP(mean): --  RR: 18 (04 Jul 2020 04:57) (18 - 19)  SpO2: 97% (04 Jul 2020 04:57) (93% - 97%)    I&O's Summary    03 Jul 2020 07:01  -  04 Jul 2020 07:00  --------------------------------------------------------  IN: 550 mL / OUT: 1200 mL / NET: -650 mL    04 Jul 2020 07:01  -  04 Jul 2020 10:59  --------------------------------------------------------  IN: 240 mL / OUT: 0 mL / NET: 240 mL              LABS: All Labs Reviewed:                        10.2   14.28 )-----------( 289      ( 04 Jul 2020 08:22 )             32.1                        04 Jul 2020 08:22    141    |  108    |  10     ----------------------------<  81     2.8     |  27     |  0.62     Ca    8.2        04 Jul 2020 08:22  Mg     1.7       04 Jul 2020 08:22      Interpretation of Telemetry: Overnight on telemetry SR 60-70's       Physical Exam:  Appearance: [ ] Normal  [ ] abnormal [X ] NAD   Eyes: [ ] PERRL [ ] EOMI  HEENT: [ ] Normal [ ] Abnormal oral mucosa [ ]NC/AT  Cardiovascular: [X ] S1 [X ] S2 [ ] RRR [ ] m/r/g [ ]edema [ ] JVP  Procedural Access Site: [ ]  hematoma [ ] tender to palpation [ ] 2+ pulse [ ] bruit [ ] Ecchymosis  Respiratory: [X ] Clear to auscultation bilaterally  Gastrointestinal: [ ] Soft [ ] tenderness[ ] distension [ ] BS  Musculoskeletal: [ ] clubbing [ ] joint deformity   Neurologic: [ ] Non-focal  Lymphatic: [ ] lymphadenopathy  Psychiatry: [X ] AAOx3  [ ] confused [ ] disoriented [ ] Mood & affect appropriate  Skin: [ ]  rashes [ ] ecchymoses [ ] cyanosis

## 2020-07-04 NOTE — PROGRESS NOTE ADULT - SUBJECTIVE AND OBJECTIVE BOX
Date/Time Patient Seen:  		  Referring MD:   Data Reviewed	       Patient is a 80y old  Male who presents with a chief complaint of Bleeding (03 Jul 2020 15:23)      Subjective/HPI     PAST MEDICAL & SURGICAL HISTORY:  Asthma  Melanoma in situ  Hypothyroid  H/O Clostridium difficile infection  Urinary retention  Plaque psoriasis  History of urinary self-catheterization  Arthritis  Prostate CA: seed therapy aug 2009  Hyperlipidemia  Hypertension  CAD (coronary artery disease)  Acute MI  S/P tonsillectomy  H/O hernia repair  S/P cardiac cath  No significant past surgical history        Medication list         MEDICATIONS  (STANDING):  enoxaparin Injectable 40 milliGRAM(s) SubCutaneous daily  ferrous    sulfate 325 milliGRAM(s) Oral daily  finasteride 5 milliGRAM(s) Oral daily  fluticasone propionate 50 MICROgram(s)/spray Nasal Spray 2 Spray(s) Both Nostrils <User Schedule>  hydrochlorothiazide 12.5 milliGRAM(s) Oral daily  lactated ringers. 1000 milliLiter(s) (50 mL/Hr) IV Continuous <Continuous>  levothyroxine 25 MICROGram(s) Oral daily  loratadine 10 milliGRAM(s) Oral daily  metoprolol succinate  milliGRAM(s) Oral daily  montelukast 10 milliGRAM(s) Oral at bedtime  pantoprazole    Tablet 40 milliGRAM(s) Oral before breakfast  predniSONE   Tablet 1 milliGRAM(s) Oral four times a day  senna 2 Tablet(s) Oral at bedtime  simvastatin 40 milliGRAM(s) Oral at bedtime  tamsulosin 0.4 milliGRAM(s) Oral at bedtime    MEDICATIONS  (PRN):  acetaminophen   Tablet .. 650 milliGRAM(s) Oral every 6 hours PRN Temp greater or equal to 38C (100.4F), Mild Pain (1 - 3)  HYDROmorphone  Injectable 0.5 milliGRAM(s) IV Push every 3 hours PRN Severe Pain (7 - 10)  ibuprofen  Tablet. 400 milliGRAM(s) Oral every 6 hours PRN Temp greater or equal to 38.5C (101.3F), Moderate Pain (4 - 6)         Vitals log        ICU Vital Signs Last 24 Hrs  T(C): 37.1 (04 Jul 2020 04:57), Max: 37.7 (03 Jul 2020 21:41)  T(F): 98.8 (04 Jul 2020 04:57), Max: 99.8 (03 Jul 2020 21:41)  HR: 62 (04 Jul 2020 04:57) (62 - 74)  BP: 146/72 (04 Jul 2020 04:57) (135/67 - 155/72)  BP(mean): --  ABP: --  ABP(mean): --  RR: 18 (04 Jul 2020 04:57) (18 - 19)  SpO2: 97% (04 Jul 2020 04:57) (93% - 97%)           Input and Output:  I&O's Detail    02 Jul 2020 07:01  -  03 Jul 2020 07:00  --------------------------------------------------------  IN:    lactated ringers.: 650 mL  Total IN: 650 mL    OUT:    Indwelling Catheter - Urethral: 100 mL    Ureteral Catheter: 1050 mL  Total OUT: 1150 mL    Total NET: -500 mL      03 Jul 2020 07:01  -  04 Jul 2020 06:16  --------------------------------------------------------  IN:    lactated ringers.: 550 mL  Total IN: 550 mL    OUT:    Ureteral Catheter: 1200 mL  Total OUT: 1200 mL    Total NET: -650 mL          Lab Data                        9.6    14.34 )-----------( 295      ( 03 Jul 2020 06:48 )             30.8     07-03    142  |  108  |  9   ----------------------------<  97  3.7   |  29  |  0.73    Ca    8.3<L>      03 Jul 2020 06:48  Mg     1.5     07-03    TPro  4.7<L>  /  Alb  1.9<L>  /  TBili  0.3  /  DBili  x   /  AST  50<H>  /  ALT  54  /  AlkPhos  161<H>  07-02            Review of Systems	      Objective     Physical Examination    heart s1s2  lung dec BS      Pertinent Lab findings & Imaging      Ana:  NO   Adequate UO     I&O's Detail    02 Jul 2020 07:01  -  03 Jul 2020 07:00  --------------------------------------------------------  IN:    lactated ringers.: 650 mL  Total IN: 650 mL    OUT:    Indwelling Catheter - Urethral: 100 mL    Ureteral Catheter: 1050 mL  Total OUT: 1150 mL    Total NET: -500 mL      03 Jul 2020 07:01  -  04 Jul 2020 06:16  --------------------------------------------------------  IN:    lactated ringers.: 550 mL  Total IN: 550 mL    OUT:    Ureteral Catheter: 1200 mL  Total OUT: 1200 mL    Total NET: -650 mL               Discussed with:     Cultures:	        Radiology

## 2020-07-04 NOTE — PROGRESS NOTE ADULT - ASSESSMENT
80M, HTN, HL, hypothy, CAD/stents, RA/psoriatic arthritis/plaque psoriasis, hx prostate cancer/brachiotherapy 2009, chronic urinary retention/intermittent self-caths - mgmt for GIB/acute blood loss anemia, near-obstructing colonic mass - c/f malignancy - s/p Vira colectomy procedure 7/2  -s/p OR for Kang procedure 7/2 - postop mgmt per Surgery  -f/u path results - HemeOnc to determine palliative tx options   -trending hgb for stability and need for supportive transfusions - hgb 9s currently  -prn analgesia  -leukocytosis - likely reactive; no jaylin e/o acute infection - monitoring off abxs for now  -HTN, HL, CAD - continue metoprolol, hctz, lipitor  -/chronic urinary retention - continue finasteride, flomax; Perez in place currently  -hypothy - continue synthroid  -dvt prophy  -dispo - likely will need HITESH placement at dispo given new colostomy, Perez, and need for assistive resources  -PT mobilization  Hypokalemia- replaced with IV runs times 3 and po KCL BID, recheck K level at 4 pm.

## 2020-07-04 NOTE — PROGRESS NOTE ADULT - PROBLEM SELECTOR PLAN 1
s/p Laparoscopic mobilization of splenic flexure of colon - Laparoscopic Vira colectomy  seen and examined - VS noted - labs reviewed - Surgery follow up noted reviewed  JEWELL andujar noted -   I gerardo  dvt p  serial abd exam and stoma care  assist with ADL  out of bed  asthma - I gerardo - monitor Sat - wean off o2 support as tolerated - on Flonase and Singulair -   caution with Opioids  curr on Clears PO  reassurance and emotional support.

## 2020-07-04 NOTE — PROGRESS NOTE ADULT - SUBJECTIVE AND OBJECTIVE BOX
Interval History:  continues to do well post op  pain is improved  Chart reviewed and events noted;   Overnight events:    MEDICATIONS  (STANDING):  enoxaparin Injectable 40 milliGRAM(s) SubCutaneous daily  ferrous    sulfate 325 milliGRAM(s) Oral daily  finasteride 5 milliGRAM(s) Oral daily  fluticasone propionate 50 MICROgram(s)/spray Nasal Spray 2 Spray(s) Both Nostrils <User Schedule>  hydrochlorothiazide 12.5 milliGRAM(s) Oral daily  lactated ringers. 1000 milliLiter(s) (50 mL/Hr) IV Continuous <Continuous>  levothyroxine 25 MICROGram(s) Oral daily  loratadine 10 milliGRAM(s) Oral daily  metoprolol succinate  milliGRAM(s) Oral daily  montelukast 10 milliGRAM(s) Oral at bedtime  pantoprazole    Tablet 40 milliGRAM(s) Oral before breakfast  potassium chloride   Powder 40 milliEquivalent(s) Oral two times a day  predniSONE   Tablet 1 milliGRAM(s) Oral four times a day  senna 2 Tablet(s) Oral at bedtime  simvastatin 40 milliGRAM(s) Oral at bedtime  tamsulosin 0.4 milliGRAM(s) Oral at bedtime    MEDICATIONS  (PRN):  acetaminophen   Tablet .. 650 milliGRAM(s) Oral every 6 hours PRN Temp greater or equal to 38C (100.4F), Mild Pain (1 - 3)  HYDROmorphone  Injectable 0.5 milliGRAM(s) IV Push every 3 hours PRN Severe Pain (7 - 10)  ibuprofen  Tablet. 400 milliGRAM(s) Oral every 6 hours PRN Temp greater or equal to 38.5C (101.3F), Moderate Pain (4 - 6)      Vital Signs Last 24 Hrs  T(C): 37 (04 Jul 2020 13:01), Max: 37.7 (03 Jul 2020 21:41)  T(F): 98.6 (04 Jul 2020 13:01), Max: 99.8 (03 Jul 2020 21:41)  HR: 64 (04 Jul 2020 13:01) (60 - 74)  BP: 123/68 (04 Jul 2020 13:01) (123/68 - 155/72)  BP(mean): --  RR: 18 (04 Jul 2020 13:01) (16 - 18)  SpO2: 99% (04 Jul 2020 13:01) (93% - 99%)    PHYSICAL EXAM  General: adult in NAD  HEENT: clear oropharynx, anicteric sclera, pink conjunctivae  Neck: supple  CV: normal S1S2 with no murmur rubs or gallops  Lungs: clear to auscultation, no wheezes, no rhales  Abdomen: soft non-tender non-distended, no hepato/splenomegaly +colostomy  Ext: no clubbing cyanosis or edema  Skin: no rashes and no petichiae  Neuro: alert and oriented X3 no focal deficits      LABS:  CBC Full  -  ( 04 Jul 2020 08:22 )  WBC Count : 14.28 K/uL  RBC Count : 3.74 M/uL  Hemoglobin : 10.2 g/dL  Hematocrit : 32.1 %  Platelet Count - Automated : 289 K/uL  Mean Cell Volume : 85.8 fl  Mean Cell Hemoglobin : 27.3 pg  Mean Cell Hemoglobin Concentration : 31.8 gm/dL  Auto Neutrophil # : x  Auto Lymphocyte # : x  Auto Monocyte # : x  Auto Eosinophil # : x  Auto Basophil # : x  Auto Neutrophil % : x  Auto Lymphocyte % : x  Auto Monocyte % : x  Auto Eosinophil % : x  Auto Basophil % : x    07-04    x   |  x   |  x   ----------------------------<  x   4.1   |  x   |  x     Ca    8.2<L>      04 Jul 2020 08:22  Phos  2.6     07-04  Mg     1.7     07-04          fe studies      WBC trend  14.28 K/uL (07-04-20 @ 08:22)  14.34 K/uL (07-03-20 @ 06:48)  9.84 K/uL (07-02-20 @ 06:42)      Hgb trend  10.2 g/dL (07-04-20 @ 08:22)  9.6 g/dL (07-03-20 @ 06:48)  9.6 g/dL (07-02-20 @ 06:42)      plt trend  289 K/uL (07-04-20 @ 08:22)  295 K/uL (07-03-20 @ 06:48)  254 K/uL (07-02-20 @ 06:42)        RADIOLOGY & ADDITIONAL STUDIES:

## 2020-07-04 NOTE — PROGRESS NOTE ADULT - ASSESSMENT
I have personally seen, examined, and participated in the care of this patient. I have reviewed all pertinent clinical information, including history, physical exam, plan, and the PA's note and agree except as noted.    s/p Kang for obstruction colonic lesion.  pt comfortable.  Tolerating full liquid diet.  Stoma viable.  No stool yet.  Abdomen soft, non distended.  Bowel sounds present.  Minimal incisional tenderness.  Feels short of breath with difficulty generating deep inspirations.  Encouraged to continue Incentive Spirometry use, seated upright.    Continue to monitor for return of bowel function.  Encourage OOB.  Incentive spirometry.  Heme/Onc on case and following.    Will need colostomy care education/instruction/training in addition to VNS services upon discharge.    Continue current management.

## 2020-07-04 NOTE — PROGRESS NOTE ADULT - ASSESSMENT
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  -seen by surgery, for palliative colon resection   -discussed w pt, who is aware of significance of findings.    s/p colon resection 7/2/2020    RECOMMEND:   1.  continue post op care  2.  follow CBC and transfuse as needed  3.  check pathology and will need biomarker studies  4.  further heme onc recommendations pending above

## 2020-07-04 NOTE — PROGRESS NOTE ADULT - ASSESSMENT
81y/o M PMH of CAD, HTN, HLD, prostate cancer (seed radiation 2009), MI (nov 2000, angioplasty w 3 stents to the right coronary artery), rheumatoid and psoriatic arthritis, plaque psoriasis, b/l hip replacements (left 1998, right 2002), C diff in 2014, urinary retention with self catheretization hypothyroidism, mild asthma, melanoma in situ presents for GIB. Pt states he noticed right red blood in his stools for 6 months. Pt has been feeling weakness, weight loss of 19 pounds since June 2019. Pt sent in by outpatient hematologist Dr. Bergeron given low Hgb outpatient. (6.8 on 6/24) Pt denies SOB or GEORGES, however feels weak when pushing heavy objects. Pt was scheduled with his GI doctor Dr. Mullen for outpatient colonoscopy in March 2020. However, colonoscopy canceled due to COVID19. Pt self catheterizes 5-6 times a day.  He is POD #2 Laparoscopic mobilization of splenic flexure of colon and Laparoscopic Vira colectomy       Sigmoid mass   - Colonoscopy revealed large fungating near obstructing sigmoid colon mass.   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  - Continue Perez   - Manage per surgery/Heme/Primary/GI    CAD S/P stent  - EKG is unchanged when compared to prior  - Pt w/o anginal complaints  - Continue Toprol  mg PO daily  - Continue Statin     HTN  - Continue Toprol XL 100mg  - Continue HCTZ 12.5mg   - Monitor routine hemodynamics.     HLD  - Continue statin    Anemia  - S/P PRBCs H&H 10.2/32.1  - Trend CBC  - Would transfuse to keep Hb>8 and watch volume status     MARY resolved   - Creatinine 0.62       - Monitor and replete lytes, keep K>4, Mg>2. - Magnesium 1.5 replaced.  F/U labs.  Cont tele with NSR and PACs/PVCs    - All other medical needs as per primary team.  - Other cardiovascular workup will depend on clinical course.  - Will continue to follow.    Angelia Johnson DNP, ANP-c  Cardiology   Spectra #[id number] [phone] 79y/o M PMH of CAD, HTN, HLD, prostate cancer (seed radiation 2009), MI (nov 2000, angioplasty w 3 stents to the right coronary artery), rheumatoid and psoriatic arthritis, plaque psoriasis, b/l hip replacements (left 1998, right 2002), C diff in 2014, urinary retention with self catheretization hypothyroidism, mild asthma, melanoma in situ presents for GIB. Pt states he noticed right red blood in his stools for 6 months. Pt has been feeling weakness, weight loss of 19 pounds since June 2019. Pt sent in by outpatient hematologist Dr. Bergeron given low Hgb outpatient. (6.8 on 6/24) Pt denies SOB or GEORGES, however feels weak when pushing heavy objects. Pt was scheduled with his GI doctor Dr. Mullen for outpatient colonoscopy in March 2020. However, colonoscopy canceled due to COVID19. Pt self catheterizes 5-6 times a day.  He is POD #2 Laparoscopic mobilization of splenic flexure of colon and Laparoscopic Vira colectomy       Sigmoid mass   - Colonoscopy revealed large fungating near obstructing sigmoid colon mass.   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  - Continue Perez   - Manage per surgery/Heme/Primary/GI    CAD S/P stent  - EKG is unchanged when compared to prior  - Pt w/o anginal complaints  - Continue Toprol  mg PO daily  - Continue Statin     HTN  - Continue Toprol XL 100mg  - Continue HCTZ 12.5mg   - Monitor routine hemodynamics.     HLD  - Continue statin    Anemia  - S/P PRBCs H&H 10.2/32.1  - Trend CBC  - Would transfuse to keep Hb>8 and watch volume status     MARY resolved   - Creatinine 0.62       - Monitor and replete lytes, keep K>4, Mg>2. - Magnesium 1.7 replaced.  F/U labs.  Cont tele with NSR and PACs/PVCs    - All other medical needs as per primary team.  - Other cardiovascular workup will depend on clinical course.  - Will continue to follow.    Angelia Johnson DNP, ANP-c  Cardiology   Spectra #[id number] [phone] 81y/o M PMH of CAD, HTN, HLD, prostate cancer (seed radiation 2009), MI (nov 2000, angioplasty w 3 stents to the right coronary artery), rheumatoid and psoriatic arthritis, plaque psoriasis, b/l hip replacements (left 1998, right 2002), C diff in 2014, urinary retention with self catheretization hypothyroidism, mild asthma, melanoma in situ presents for GIB. Pt states he noticed right red blood in his stools for 6 months. Pt has been feeling weakness, weight loss of 19 pounds since June 2019. Pt sent in by outpatient hematologist Dr. Bergeron given low Hgb outpatient. (6.8 on 6/24) Pt denies SOB or GEORGES, however feels weak when pushing heavy objects. Pt was scheduled with his GI doctor Dr. Mullen for outpatient colonoscopy in March 2020. However, colonoscopy canceled due to COVID19. Pt self catheterizes 5-6 times a day.  He is POD #2 Laparoscopic mobilization of splenic flexure of colon and Laparoscopic Vira colectomy     Sigmoid mass   - Colonoscopy revealed large fungating near obstructing sigmoid colon mass.   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  - Continue Perez   - Manage per surgery/Heme/Primary/GI    CAD S/P stent  - EKG is unchanged when compared to prior  - Pt w/o anginal complaints  - Continue Toprol  mg PO daily  - Continue Statin     HTN  - Continue Toprol XL 100mg  - Continue HCTZ 12.5mg   - Monitor routine hemodynamics.     HLD  - Continue statin    Anemia  - S/P PRBCs H&H 10.2/32.1  - Trend CBC  - Would transfuse to keep Hb>8 and watch volume status     MARY resolved   - Creatinine 0.62     - Monitor and replete lytes, keep K>4, Mg>2. - Magnesium 1.7 replaced.  F/U labs.  Cont tele with NSR and PACs/PVCs    - All other medical needs as per primary team.  - Other cardiovascular workup will depend on clinical course.  - Will continue to follow.    Angelia Johnson DNP, ANP-c  Cardiology   Spectra #[phone]

## 2020-07-04 NOTE — PROGRESS NOTE ADULT - SUBJECTIVE AND OBJECTIVE BOX
Patient is a 80y old  Male who presents with a chief complaint of Bleeding (03 Jul 2020 15:23)      INTERVAL HPI/OVERNIGHT EVENTS: 80M, HTN, HL, hypothy, CAD/stents, RA/psoriatic arthritis/plaque psoriasis, hx prostate cancer/brachiotherapy 2009, chronic urinary retention/intermittent self-caths - mgmt for GIB/acute blood loss anemia, near-obstructing colonic mass - c/f malignancy    MEDICATIONS  (STANDING):  enoxaparin Injectable 40 milliGRAM(s) SubCutaneous daily  ferrous    sulfate 325 milliGRAM(s) Oral daily  finasteride 5 milliGRAM(s) Oral daily  fluticasone propionate 50 MICROgram(s)/spray Nasal Spray 2 Spray(s) Both Nostrils <User Schedule>  hydrochlorothiazide 12.5 milliGRAM(s) Oral daily  lactated ringers. 1000 milliLiter(s) (50 mL/Hr) IV Continuous <Continuous>  levothyroxine 25 MICROGram(s) Oral daily  loratadine 10 milliGRAM(s) Oral daily  magnesium sulfate  IVPB 2 Gram(s) IV Intermittent once  metoprolol succinate  milliGRAM(s) Oral daily  montelukast 10 milliGRAM(s) Oral at bedtime  pantoprazole    Tablet 40 milliGRAM(s) Oral before breakfast  potassium chloride   Powder 40 milliEquivalent(s) Oral two times a day  potassium chloride  10 mEq/100 mL IVPB 10 milliEquivalent(s) IV Intermittent every 1 hour  predniSONE   Tablet 1 milliGRAM(s) Oral four times a day  senna 2 Tablet(s) Oral at bedtime  simvastatin 40 milliGRAM(s) Oral at bedtime  tamsulosin 0.4 milliGRAM(s) Oral at bedtime    MEDICATIONS  (PRN):  acetaminophen   Tablet .. 650 milliGRAM(s) Oral every 6 hours PRN Temp greater or equal to 38C (100.4F), Mild Pain (1 - 3)  HYDROmorphone  Injectable 0.5 milliGRAM(s) IV Push every 3 hours PRN Severe Pain (7 - 10)  ibuprofen  Tablet. 400 milliGRAM(s) Oral every 6 hours PRN Temp greater or equal to 38.5C (101.3F), Moderate Pain (4 - 6)      Allergies    cephalexin (Urticaria)  clindamycin (Diarrhea)  erythromycin (Other)  lactose (Stomach Upset)  Methotrexate Sodium (Unknown)  penicillins (Hives)  sporalin (oxypsoralin/PUVA) (Other)  sulfa drugs (Rash)    Intolerances        REVIEW OF SYSTEMS:  CONSTITUTIONAL: No fever, weight loss, or fatigue  EYES: No eye pain, visual disturbances, or discharge  ENMT:  No difficulty hearing, tinnitus, vertigo; No sinus or throat pain  NECK: No pain or stiffness  BREASTS: No pain, masses, or nipple discharge  RESPIRATORY: No cough, wheezing, chills or hemoptysis; No shortness of breath  CARDIOVASCULAR: No chest pain, palpitations, dizziness, or leg swelling  GASTROINTESTINAL: No abdominal or epigastric pain. No nausea, vomiting, or hematemesis; No diarrhea or constipation. No melena or hematochezia.  GENITOURINARY: No dysuria, frequency, hematuria, or incontinence  NEUROLOGICAL: No headaches, memory loss, loss of strength, numbness, or tremors  SKIN: No itching, burning, rashes, or lesions   LYMPH NODES: No enlarged glands  ENDOCRINE: No heat or cold intolerance; No hair loss; No polydipsia or polyuria  MUSCULOSKELETAL: No joint pain or swelling; No muscle, back, or extremity pain  PSYCHIATRIC: No depression, anxiety, mood swings, or difficulty sleeping  HEME/LYMPH: No easy bruising, or bleeding gums  ALLERGY AND IMMUNOLOGIC: No hives or eczema    Vital Signs Last 24 Hrs  T(C): 37.1 (04 Jul 2020 04:57), Max: 37.7 (03 Jul 2020 21:41)  T(F): 98.8 (04 Jul 2020 04:57), Max: 99.8 (03 Jul 2020 21:41)  HR: 62 (04 Jul 2020 04:57) (62 - 74)  BP: 146/72 (04 Jul 2020 04:57) (135/67 - 155/72)  BP(mean): --  RR: 18 (04 Jul 2020 04:57) (18 - 19)  SpO2: 97% (04 Jul 2020 04:57) (93% - 97%)    PHYSICAL EXAM:  GENERAL: NAD, well-groomed, well-developed  HEAD:  Atraumatic, Normocephalic  EYES: EOMI, PERRLA, conjunctiva and sclera clear  ENMT: No tonsillar erythema, exudates, or enlargement; Moist mucous membranes, Good dentition, No lesions  NECK: Supple, No JVD, Normal thyroid  NERVOUS SYSTEM:  Alert & Oriented X3, Good concentration; Motor Strength 5/5 B/L upper and lower extremities; DTRs 2+ intact and symmetric  CHEST/LUNG: Clear to auscultation bilaterally; No rales, rhonchi, wheezing, or rubs  HEART: Regular rate and rhythm; No murmurs, rubs, or gallops  ABDOMEN: Soft, Nontender, Nondistended; Bowel sounds present  EXTREMITIES:  2+ Peripheral Pulses, No clubbing, cyanosis, or edema  LYMPH: No lymphadenopathy noted  SKIN: No rashes or lesions    LABS:                        10.2   14.28 )-----------( 289      ( 04 Jul 2020 08:22 )             32.1     04 Jul 2020 08:22    141    |  108    |  10     ----------------------------<  81     2.8     |  27     |  0.62     Ca    8.2        04 Jul 2020 08:22  Phos  2.6       04 Jul 2020 08:22  Mg     1.7       04 Jul 2020 08:22          CAPILLARY BLOOD GLUCOSE          RADIOLOGY & ADDITIONAL TESTS:    Imaging Personally Reviewed:  [x ] YES  [ ] NO    Consultant(s) Notes Reviewed:  [x ] YES  [ ] NO    Care Discussed with Consultants/Other Providers [x ] YES  [ ] NO

## 2020-07-04 NOTE — PROGRESS NOTE ADULT - ASSESSMENT
MARY on CKD 2  GI bleeding  HTN  Anemia  Sigmoid Mass  Hypokalemia    -BLCr 0.9  -MARY likely 2/2 hypoxic/prerenal injury driven by GI bleeding  -UA 30 protein  -Ur lytes reviewed   -Renal indices remain stable at baseline range; electrolytes are stable as well  -PRBCs per primary; Hb currently stable  -BP well controlled; tolerating HCTZ  -Surg eval noted;  S/P OR  -Mag ordered  -Potassium repletion ordered     Thank you

## 2020-07-04 NOTE — PROGRESS NOTE ADULT - SUBJECTIVE AND OBJECTIVE BOX
Patient is a 80y old  Male who presents with a chief complaint of  GI bleeding    HPI: Hypotonic Bladder, HTN, HLD, Prostate Cancer presents with GI bleeding and fatigue/weakness.  Patient has been having ongoing issues with GI bleeding and was scheduled to have oupt endoscopy, however was delayed due to covid 19.  He presents with Fatigue.  He chronically straight caths himself due to urinary retention for 12 years and has followed with urology.  He states he has been straight cathing with normal volume, but has been having more frequency.  He denies dysuria/SOB/CP/Dizziness/N/V.  Saw Dr. Carrasco-nephrologist sometime ago.     Follow up Acute on CKD Stage 2  No new complaints    PAST MEDICAL & SURGICAL HISTORY:  Prostate CA  Hyperlipidemia  Hypertension  CAD (coronary artery disease)  Acute MI  No significant past surgical history       FAMILY HISTORY:  NC    Social History:Non smoker    MEDICATIONS  (STANDING):  pantoprazole  Injectable 40 milliGRAM(s) IV Push Once  pantoprazole  Injectable 40 milliGRAM(s) IV Push two times a day    MEDICATIONS  (PRN):   Meds reviewed    Allergies    clindamycin (Diarrhea)  penicillins (Hives)  sulfa drugs (Rash)    Intolerances         REVIEW OF SYSTEMS:    CONSTITUTIONAL:  neg  EYES: No eye pain, visual disturbances, or discharge  ENMT:  No difficulty hearing, tinnitus, vertigo; No sinus or throat pain  NECK: No pain or stiffness  BREASTS: No pain, masses, or nipple discharge  RESPIRATORY: No SOB. No wheeze. No GEORGES  CARDIOVASCULAR: No chest pain, palpitations, dizziness,   GASTROINTESTINAL: No abdominal or epigastric pain. No nausea, vomiting, or hematemesis; No diarrhea or constipation.   GENITOURINARY: No dysuria, frequency, hematuria, or incontinence  NEUROLOGICAL: No headaches, memory loss, loss of strength, numbness, or tremors  SKIN: no Diffuse erythema, no blisters  LYMPH NODES: No enlarged glands  ENDOCRINE: No heat or cold intolerance; No hair loss  MUSCULOSKELETAL: No joint pain or swelling   PSYCHIATRIC: No depression, anxiety, mood swings, or difficulty sleeping  HEME/LYMPH: No easy bruising, or bleeding gums  ALLERGY AND IMMUNOLOGIC: No hives or eczema     ICU Vital Signs Last 24 Hrs  T(C): 37.1 (04 Jul 2020 04:57), Max: 37.7 (03 Jul 2020 21:41)  T(F): 98.8 (04 Jul 2020 04:57), Max: 99.8 (03 Jul 2020 21:41)  HR: 62 (04 Jul 2020 04:57) (62 - 74)  BP: 146/72 (04 Jul 2020 04:57) (138/70 - 155/72)  BP(mean): --  ABP: --  ABP(mean): --  RR: 18 (04 Jul 2020 04:57) (18 - 18)  SpO2: 97% (04 Jul 2020 04:57) (93% - 97%)      PHYSICAL EXAM:    GENERAL: No acute distress  HEAD:  Atraumatic, Normocephalic  EYES: Conjunctiva and sclera clear  ENMT: No tonsillar erythema, exudates, or enlargement; Moist mucous membranes, Good dentition, No lesions  NECK: Supple, neck  veins full  NERVOUS SYSTEM:  Alert & Oriented X3, Good concentration; Motor Strength wnl upper and lower extremities  CHEST/LUNG: Clear to percussion bilaterally; No rales, rhonchi, wheezing, or rubs  HEART: Regular rate and rhythm; No murmurs, rubs, or gallops  ABDOMEN: Soft, Nontender, Nondistended; Bowel sounds present,  EXTREMITIES:  No Edema  SKIN: No rashes or lesions, pale      LABS:                    reviewed

## 2020-07-05 NOTE — PROGRESS NOTE ADULT - SUBJECTIVE AND OBJECTIVE BOX
[facility] -- Noe Land, Jamel Sung Pannella, Patel, Savella  Office # [phone]      Follow Up:    Gi bleed, CAD  Subjective/Observations:   No events overnight resting comfortably in bed.  No complaints of chest pain, dyspnea, or palpitations reported. No signs of orthopnea or PND.     REVIEW OF SYSTEMS: All other review of systems is negative unless indicated above    PAST MEDICAL & SURGICAL HISTORY:  Asthma  Melanoma in situ  Hypothyroid  H/O Clostridium difficile infection  Urinary retention  Plaque psoriasis  History of urinary self-catheterization  Arthritis  Prostate CA: seed therapy aug 2009  Hyperlipidemia  Hypertension  CAD (coronary artery disease)  Acute MI  S/P tonsillectomy  H/O hernia repair  S/P cardiac cath      MEDICATIONS  (STANDING):  enoxaparin Injectable 40 milliGRAM(s) SubCutaneous daily  ferrous    sulfate 325 milliGRAM(s) Oral daily  finasteride 5 milliGRAM(s) Oral daily  fluticasone propionate 50 MICROgram(s)/spray Nasal Spray 2 Spray(s) Both Nostrils <User Schedule>  hydrochlorothiazide 12.5 milliGRAM(s) Oral daily  levothyroxine 25 MICROGram(s) Oral daily  loratadine 10 milliGRAM(s) Oral daily  metoprolol succinate  milliGRAM(s) Oral daily  montelukast 10 milliGRAM(s) Oral at bedtime  pantoprazole    Tablet 40 milliGRAM(s) Oral before breakfast  potassium chloride   Powder 40 milliEquivalent(s) Oral two times a day  predniSONE   Tablet 1 milliGRAM(s) Oral four times a day  senna 2 Tablet(s) Oral at bedtime  simvastatin 40 milliGRAM(s) Oral at bedtime  tamsulosin 0.4 milliGRAM(s) Oral at bedtime    MEDICATIONS  (PRN):  acetaminophen   Tablet .. 650 milliGRAM(s) Oral every 6 hours PRN Temp greater or equal to 38C (100.4F), Mild Pain (1 - 3)  HYDROmorphone  Injectable 0.5 milliGRAM(s) IV Push every 3 hours PRN Severe Pain (7 - 10)  ibuprofen  Tablet. 400 milliGRAM(s) Oral every 6 hours PRN Temp greater or equal to 38.5C (101.3F), Moderate Pain (4 - 6)      Allergies    cephalexin (Urticaria)  clindamycin (Diarrhea)  erythromycin (Other)  lactose (Stomach Upset)  Methotrexate Sodium (Unknown)  penicillins (Hives)  sporalin (oxypsoralin/PUVA) (Other)  sulfa drugs (Rash)    Intolerances        Vital Signs Last 24 Hrs  T(C): 36.8 (05 Jul 2020 10:30), Max: 37 (04 Jul 2020 13:01)  T(F): 98.3 (05 Jul 2020 10:30), Max: 98.6 (04 Jul 2020 13:01)  HR: 70 (05 Jul 2020 10:30) (64 - 71)  BP: 140/78 (05 Jul 2020 10:30) (123/68 - 147/73)  BP(mean): --  RR: 17 (05 Jul 2020 10:30) (17 - 18)  SpO2: 96% (05 Jul 2020 10:30) (96% - 99%)    I&O's Summary    04 Jul 2020 07:01  -  05 Jul 2020 07:00  --------------------------------------------------------  IN: 680 mL / OUT: 2550 mL / NET: -1870 mL          PHYSICAL EXAM:  TELE: Off tele   Constitutional: NAD, awake and alert, well-developed  HEENT: Moist Mucous Membranes, Anicteric  Pulmonary: Non-labored, breath sounds are clear bilaterally, No wheezing, crackles or rhonchi  Cardiovascular: Regular, S1 and S2 nl, + murmur No rubs, gallops or clicks   Gastrointestinal: Bowel Sounds present, soft, nontender.   Lymph: No lymphadenopathy. No peripheral edema.  Skin: No visible rashes or ulcers.  Psych:  Mood & affect appropriate    LABS: All Labs Reviewed:                        9.7    12.96 )-----------( 271      ( 05 Jul 2020 07:12 )             31.6                         10.2   14.28 )-----------( 289      ( 04 Jul 2020 08:22 )             32.1                         9.6    14.34 )-----------( 295      ( 03 Jul 2020 06:48 )             30.8     05 Jul 2020 07:12    140    |  108    |  10     ----------------------------<  76     4.1     |  28     |  0.57   04 Jul 2020 16:42    x      |  x      |  x      ----------------------------<  x      4.1     |  x      |  x      04 Jul 2020 08:22    141    |  108    |  10     ----------------------------<  81     2.8     |  27     |  0.62     Ca    8.5        05 Jul 2020 07:12  Ca    8.2        04 Jul 2020 08:22  Ca    8.3        03 Jul 2020 06:48  Phos  2.6       04 Jul 2020 08:22  Mg     2.1       05 Jul 2020 07:12  Mg     1.7       04 Jul 2020 08:22  Mg     1.5       03 Jul 2020 06:48               ECG:  < from: 12 Lead ECG (06.25.20 @ 12:09) >  Ventricular Rate 69 BPM    Atrial Rate 69 BPM    P-R Interval 134 ms    QRS Duration 90 ms    Q-T Interval 440 ms    QTC Calculation(Bezet) 471 ms    P Axis 61 degrees    R Axis -31 degrees    T Axis 133 degrees    Diagnosis Line Normal sinus rhythm  Left axis deviation  Left ventricular hypertrophy with repolarization abnormality  Inferior infarct (cited on or before 29-MAR-2010)  Nonspecific ST abnormality  Abnormal ECG  Confirmed by francois Kaplan ([id number]) on 6/25/2020 2:14:26 PM    < end of copied text >    Echo:    Radiology:  < from: Xray Chest 1 View- PORTABLE-Routine (06.25.20 @ 12:15) >    EXAM:  XR CHEST PORTABLE ROUTINE 1V                            PROCEDURE DATE:  06/25/2020          INTERPRETATION:  Medical evaluation.    AP chest. Prior 3/11/2013.    Heart magnified by projection. Atherosclerotic aorta. Occasional bilateral calcified granulomas. No acute infiltrate or pleural effusion.    Impression: No active disease                ALESIA RAMACHANDRAN M.D., ATTENDING RADIOLOGIST  This document has been electronically signed. Jun 25 2020 12:26PM                < end of copied text >

## 2020-07-05 NOTE — CHART NOTE - NSCHARTNOTESELECT_GEN_ALL_CORE
Nutrition Services
Event Note
Nutrition Services
Nutrition Services/Follow Up
Nutrition Services/Malnutrition

## 2020-07-05 NOTE — PROGRESS NOTE ADULT - ASSESSMENT
MARY on CKD 2  GI bleeding  HTN  Anemia  Sigmoid Mass  Hypokalemia    -BLCr 0.9  -MARY likely 2/2 hypoxic/prerenal injury driven by GI bleeding  -UA 30 protein  -Ur lytes reviewed   -Renal indices remain stable at baseline range; electrolytes are stable as well  -PRBCs per primary; Hb currently stable  -BP well controlled; tolerating HCTZ  -Surg eval noted;  S/P OR  -Mag ordered  -Potassium improved    Thank you

## 2020-07-05 NOTE — PROGRESS NOTE ADULT - SUBJECTIVE AND OBJECTIVE BOX
Patient is a 80y old  Male who presents with a chief complaint of  GI bleeding    HPI: Hypotonic Bladder, HTN, HLD, Prostate Cancer presents with GI bleeding and fatigue/weakness.  Patient has been having ongoing issues with GI bleeding and was scheduled to have oupt endoscopy, however was delayed due to covid 19.  He presents with Fatigue.  He chronically straight caths himself due to urinary retention for 12 years and has followed with urology.  He states he has been straight cathing with normal volume, but has been having more frequency.  He denies dysuria/SOB/CP/Dizziness/N/V.  Saw Dr. Carrasco-nephrologist sometime ago.     Follow up Acute on CKD Stage 2  No new complaints    PAST MEDICAL & SURGICAL HISTORY:  Prostate CA  Hyperlipidemia  Hypertension  CAD (coronary artery disease)  Acute MI  No significant past surgical history       FAMILY HISTORY:  NC    Social History:Non smoker    MEDICATIONS  (STANDING):  pantoprazole  Injectable 40 milliGRAM(s) IV Push Once  pantoprazole  Injectable 40 milliGRAM(s) IV Push two times a day    MEDICATIONS  (PRN):   Meds reviewed    Allergies    clindamycin (Diarrhea)  penicillins (Hives)  sulfa drugs (Rash)    Intolerances         REVIEW OF SYSTEMS:    CONSTITUTIONAL:  neg  EYES: No eye pain, visual disturbances, or discharge  ENMT:  No difficulty hearing, tinnitus, vertigo; No sinus or throat pain  NECK: No pain or stiffness  BREASTS: No pain, masses, or nipple discharge  RESPIRATORY: No SOB. No wheeze. No GEORGES  CARDIOVASCULAR: No chest pain, palpitations, dizziness,   GASTROINTESTINAL: No abdominal or epigastric pain. No nausea, vomiting, or hematemesis; No diarrhea or constipation.   GENITOURINARY: No dysuria, frequency, hematuria, or incontinence  NEUROLOGICAL: No headaches, memory loss, loss of strength, numbness, or tremors  SKIN: no Diffuse erythema, no blisters  LYMPH NODES: No enlarged glands  ENDOCRINE: No heat or cold intolerance; No hair loss  MUSCULOSKELETAL: No joint pain or swelling   PSYCHIATRIC: No depression, anxiety, mood swings, or difficulty sleeping  HEME/LYMPH: No easy bruising, or bleeding gums  ALLERGY AND IMMUNOLOGIC: No hives or eczema  ICU Vital Signs Last 24 Hrs  T(C): 36.8 (05 Jul 2020 10:30), Max: 37 (04 Jul 2020 13:01)  T(F): 98.3 (05 Jul 2020 10:30), Max: 98.6 (04 Jul 2020 13:01)  HR: 70 (05 Jul 2020 10:30) (64 - 71)  BP: 140/78 (05 Jul 2020 10:30) (123/68 - 147/73)  BP(mean): --  ABP: --  ABP(mean): --  RR: 17 (05 Jul 2020 10:30) (17 - 18)  SpO2: 96% (05 Jul 2020 10:30) (96% - 99%)        PHYSICAL EXAM:    GENERAL: No acute distress  HEAD:  Atraumatic, Normocephalic  EYES: Conjunctiva and sclera clear  ENMT: No tonsillar erythema, exudates, or enlargement; Moist mucous membranes, Good dentition, No lesions  NECK: Supple, neck  veins full  NERVOUS SYSTEM:  Alert & Oriented X3, Good concentration; Motor Strength wnl upper and lower extremities  CHEST/LUNG: Clear to percussion bilaterally; No rales, rhonchi, wheezing, or rubs  HEART: Regular rate and rhythm; No murmurs, rubs, or gallops  ABDOMEN: Soft, Nontender, Nondistended; Bowel sounds present,  EXTREMITIES:  No Edema  SKIN: No rashes or lesions, pale      LABS:                    reviewed

## 2020-07-05 NOTE — PROGRESS NOTE ADULT - PROBLEM SELECTOR PLAN 1
s/p Laparoscopic mobilization of splenic flexure of colon - Laparoscopic Vira colectomy  seen and examined - VS noted - labs reviewed - Surgery follow up noted reviewed  JEWELL andujar noted -   I gerardo  dvt p  serial abd exam and stoma care  assist with ADL  out of bed  asthma - I gerardo - monitor Sat - wean off o2 support as tolerated - on Flonase and Singulair -   caution with Opioids  curr on Full Liq diet -   reassurance and emotional support.

## 2020-07-05 NOTE — CHART NOTE - NSCHARTNOTEFT_GEN_A_CORE
Assessment: As per chart pt is a 80 year old male with a PMH of HTN, HL, hypothy, CAD/stents, RA/psoriatic arthritis/plaque psoriasis, hx prostate cancer/brachiotherapy 2009, chronic urinary retention/intermittent self-caths - mgmt for GIB/acute blood loss anemia, near-obstructing colonic mass - c/f malignancy - s/p Vira colectomy procedure 7/2.    Pt recently advanced to Regular diet has not received a meal yet however is looking forward to it, was previously on full liquids and was tolerating it well. Pt now with colostomy; output: 97/4) 150ml, (7/5) 50ml thus far. Pt provided with oral and written education on Colostomy diet, reviiewd avoiding raw fruits and vegetables, watching intake of whole grains, to slowly add fiber back into diet over time. Pt states that he tried to follow a low fiber diet PTA as raw fruits and vegetables cause him to have gas and pain, also has intolerance to lactose. Pt denies any nausea/ vomiting, reports some abdominal pain.     Factors impacting intake: [x ] none [ ] nausea  [ ] vomiting [ ] diarrhea [ ] constipation  [ ]chewing problems [ ] swallowing issues  [ ] other:     Diet Presciption: Diet, Regular:   No Lactose (07-05-20 @ 09:10)    Intake:     Current Weight: no updated weight in chart  Admission weight: 104.1lbs  % Weight Change- recommend to obtain pt's updated body weight     Pertinent Medications: MEDICATIONS  (STANDING):  enoxaparin Injectable 40 milliGRAM(s) SubCutaneous daily  ferrous    sulfate 325 milliGRAM(s) Oral daily  finasteride 5 milliGRAM(s) Oral daily  fluticasone propionate 50 MICROgram(s)/spray Nasal Spray 2 Spray(s) Both Nostrils <User Schedule>  hydrochlorothiazide 12.5 milliGRAM(s) Oral daily  levothyroxine 25 MICROGram(s) Oral daily  loratadine 10 milliGRAM(s) Oral daily  metoprolol succinate  milliGRAM(s) Oral daily  montelukast 10 milliGRAM(s) Oral at bedtime  pantoprazole    Tablet 40 milliGRAM(s) Oral before breakfast  potassium chloride   Powder 40 milliEquivalent(s) Oral two times a day  predniSONE   Tablet 1 milliGRAM(s) Oral four times a day  senna 2 Tablet(s) Oral at bedtime  simvastatin 40 milliGRAM(s) Oral at bedtime  tamsulosin 0.4 milliGRAM(s) Oral at bedtime    MEDICATIONS  (PRN):  acetaminophen   Tablet .. 650 milliGRAM(s) Oral every 6 hours PRN Temp greater or equal to 38C (100.4F), Mild Pain (1 - 3)  HYDROmorphone  Injectable 0.5 milliGRAM(s) IV Push every 3 hours PRN Severe Pain (7 - 10)  ibuprofen  Tablet. 400 milliGRAM(s) Oral every 6 hours PRN Temp greater or equal to 38.5C (101.3F), Moderate Pain (4 - 6)    Pertinent Labs: 07-05 Na140 mmol/L Glu 76 mg/dL K+ 4.1 mmol/L Cr  0.57 mg/dL BUN 10 mg/dL, Hgb 9.7, Hct 31.6, 07-04 Phos 2.6 mg/dL 07-02 Alb 1.9 g/dL<L>     CAPILLARY BLOOD GLUCOSE    Skin: Stage 1 sacrum pressure injury  No edema noted at this time     Estimated Needs:   [ x] no change since previous assessment  [ ] recalculated:     Previous Nutrition Diagnosis:    [x ] Malnutrition     Nutrition Diagnosis is [ x] ongoing-being addressed with PO diet, food preferences obtained     New Nutrition Diagnosis: [ x] not applicable       Interventions:   Recommend  [ x] Change Diet To: Recommend to change diet to low fiber, no lactose   [x ] Nutrition Supplement: Recommend Ensure Enlive, BID (chocolate)  [ ] Nutrition Support  [ x] Other:   1) Pt's food preferences obtained and will be honored (add double portions of protein)  2) Pt provided with oral and written education on Colostomy diet  3) Encourage adequate PO intake  4) Monitor pt's PO intake, weight, skin, edema, GI distress     Monitoring and Evaluation:   [x ] PO intake [ x ] Tolerance to diet prescription [ x ] weights [ x ] labs[ x ] follow up per protocol  [ ] other: Assessment: Pt seen for malnutrition follow-up. As per chart pt is a 80 year old male with a PMH of HTN, HL, hypothy, CAD/stents, RA/psoriatic arthritis/plaque psoriasis, hx prostate cancer/brachiotherapy 2009, chronic urinary retention/intermittent self-caths - [facility] for GIB/acute blood loss anemia, near-obstructing colonic mass - c/f malignancy - s/p Vira colectomy procedure 7/2.    Pt recently advanced to Regular diet has not received a meal yet however is looking forward to it, was previously on full liquids and was tolerating it well. Pt now with colostomy; output: 97/4) 150ml, (7/5) 50ml thus far. Pt provided with oral and written education on Colostomy diet, reviiewd avoiding raw fruits and vegetables, watching intake of whole grains, to slowly add fiber back into diet over time. Pt states that he tried to follow a low fiber diet PTA as raw fruits and vegetables cause him to have gas and pain, also has intolerance to lactose. Pt denies any nausea/ vomiting, reports some abdominal pain.     Factors impacting intake: [x ] none [ ] nausea  [ ] vomiting [ ] diarrhea [ ] constipation  [ ]chewing problems [ ] swallowing issues  [ ] other:     Diet Presciption: Diet, Regular:   No Lactose (07-05-20 @ 09:10)    Intake:     Current Weight: no updated weight in chart  Admission weight: 104.1lbs  % Weight Change- recommend to obtain pt's updated body weight     Pertinent Medications: MEDICATIONS  (STANDING):  enoxaparin Injectable 40 milliGRAM(s) SubCutaneous daily  ferrous    sulfate 325 milliGRAM(s) Oral daily  finasteride 5 milliGRAM(s) Oral daily  fluticasone propionate 50 MICROgram(s)/spray Nasal Spray 2 Spray(s) Both Nostrils <User Schedule>  hydrochlorothiazide 12.5 milliGRAM(s) Oral daily  levothyroxine 25 MICROGram(s) Oral daily  loratadine 10 milliGRAM(s) Oral daily  metoprolol succinate  milliGRAM(s) Oral daily  montelukast 10 milliGRAM(s) Oral at bedtime  pantoprazole    Tablet 40 milliGRAM(s) Oral before breakfast  potassium chloride   Powder 40 milliEquivalent(s) Oral two times a day  predniSONE   Tablet 1 milliGRAM(s) Oral four times a day  senna 2 Tablet(s) Oral at bedtime  simvastatin 40 milliGRAM(s) Oral at bedtime  tamsulosin 0.4 milliGRAM(s) Oral at bedtime    MEDICATIONS  (PRN):  acetaminophen   Tablet .. 650 milliGRAM(s) Oral every 6 hours PRN Temp greater or equal to 38C (100.4F), Mild Pain (1 - 3)  HYDROmorphone  Injectable 0.5 milliGRAM(s) IV Push every 3 hours PRN Severe Pain (7 - 10)  ibuprofen  Tablet. 400 milliGRAM(s) Oral every 6 hours PRN Temp greater or equal to 38.5C (101.3F), Moderate Pain (4 - 6)    Pertinent Labs: 07-05 Na140 mmol/L Glu 76 mg/dL K+ 4.1 mmol/L Cr  0.57 mg/dL BUN 10 mg/dL, Hgb 9.7, Hct 31.6, 07-04 Phos 2.6 mg/dL 07-02 Alb 1.9 g/dL<L>     CAPILLARY BLOOD GLUCOSE    Skin: Stage 1 sacrum pressure injury  No edema noted at this time     Estimated Needs:   [ x] no change since previous assessment  [ ] recalculated:     Previous Nutrition Diagnosis:    [x ] Malnutrition     Nutrition Diagnosis is [ x] ongoing-being addressed with PO diet, food preferences obtained     New Nutrition Diagnosis: [ x] not applicable       Interventions:   Recommend  [ x] Change Diet To: Recommend to change diet to low fiber, no lactose   [x ] Nutrition Supplement: Recommend Ensure Enlive, BID (chocolate)  [ ] Nutrition Support  [ x] Other:   1) Pt's food preferences obtained and will be honored (add double portions of protein)  2) Pt provided with oral and written education on Colostomy diet  3) Encourage adequate PO intake  4) Monitor pt's PO intake, weight, skin, edema, GI distress     Monitoring and Evaluation:   [x ] PO intake [ x ] Tolerance to diet prescription [ x ] weights [ x ] labs[ x ] follow up per protocol  [ ] other:

## 2020-07-05 NOTE — PROGRESS NOTE ADULT - SUBJECTIVE AND OBJECTIVE BOX
Patient is a 80y old  Male who presents with a chief complaint of GI bleeding (05 Jul 2020 12:09)      INTERVAL HPI/OVERNIGHT EVENTS: 80M, HTN, HL, hypothy, CAD/stents, RA/psoriatic arthritis/plaque psoriasis, hx prostate cancer/brachiotherapy 2009, chronic urinary retention/intermittent self-caths - mgmt for GIB/acute blood loss anemia, near-obstructing colonic mass - c/f malignancy    MEDICATIONS  (STANDING):  enoxaparin Injectable 40 milliGRAM(s) SubCutaneous daily  ferrous    sulfate 325 milliGRAM(s) Oral daily  finasteride 5 milliGRAM(s) Oral daily  fluticasone propionate 50 MICROgram(s)/spray Nasal Spray 2 Spray(s) Both Nostrils <User Schedule>  hydrochlorothiazide 12.5 milliGRAM(s) Oral daily  levothyroxine 25 MICROGram(s) Oral daily  loratadine 10 milliGRAM(s) Oral daily  metoprolol succinate  milliGRAM(s) Oral daily  montelukast 10 milliGRAM(s) Oral at bedtime  pantoprazole    Tablet 40 milliGRAM(s) Oral before breakfast  potassium chloride   Powder 40 milliEquivalent(s) Oral two times a day  predniSONE   Tablet 1 milliGRAM(s) Oral four times a day  senna 2 Tablet(s) Oral at bedtime  simvastatin 40 milliGRAM(s) Oral at bedtime  tamsulosin 0.4 milliGRAM(s) Oral at bedtime    MEDICATIONS  (PRN):  acetaminophen   Tablet .. 650 milliGRAM(s) Oral every 6 hours PRN Temp greater or equal to 38C (100.4F), Mild Pain (1 - 3)  HYDROmorphone  Injectable 0.5 milliGRAM(s) IV Push every 3 hours PRN Severe Pain (7 - 10)  ibuprofen  Tablet. 400 milliGRAM(s) Oral every 6 hours PRN Temp greater or equal to 38.5C (101.3F), Moderate Pain (4 - 6)      Allergies    cephalexin (Urticaria)  clindamycin (Diarrhea)  erythromycin (Other)  lactose (Stomach Upset)  Methotrexate Sodium (Unknown)  penicillins (Hives)  sporalin (oxypsoralin/PUVA) (Other)  sulfa drugs (Rash)    Intolerances        REVIEW OF SYSTEMS:  CONSTITUTIONAL: No fever, weight loss, or fatigue  EYES: No eye pain, visual disturbances, or discharge  ENMT:  No difficulty hearing, tinnitus, vertigo; No sinus or throat pain  NECK: No pain or stiffness  BREASTS: No pain, masses, or nipple discharge  RESPIRATORY: No cough, wheezing, chills or hemoptysis; No shortness of breath  CARDIOVASCULAR: No chest pain, palpitations, dizziness, or leg swelling  GASTROINTESTINAL: No abdominal or epigastric pain. No nausea, vomiting, or hematemesis; No diarrhea or constipation. No melena or hematochezia.  GENITOURINARY: No dysuria, frequency, hematuria, or incontinence  NEUROLOGICAL: No headaches, memory loss, loss of strength, numbness, or tremors  SKIN: No itching, burning, rashes, or lesions   LYMPH NODES: No enlarged glands  ENDOCRINE: No heat or cold intolerance; No hair loss; No polydipsia or polyuria  MUSCULOSKELETAL: No joint pain or swelling; No muscle, back, or extremity pain  PSYCHIATRIC: No depression, anxiety, mood swings, or difficulty sleeping  HEME/LYMPH: No easy bruising, or bleeding gums  ALLERGY AND IMMUNOLOGIC: No hives or eczema    Vital Signs Last 24 Hrs  T(C): 36.8 (05 Jul 2020 10:30), Max: 37 (04 Jul 2020 13:01)  T(F): 98.3 (05 Jul 2020 10:30), Max: 98.6 (04 Jul 2020 13:01)  HR: 70 (05 Jul 2020 10:30) (64 - 71)  BP: 140/78 (05 Jul 2020 10:30) (123/68 - 147/73)  BP(mean): --  RR: 17 (05 Jul 2020 10:30) (17 - 18)  SpO2: 96% (05 Jul 2020 10:30) (96% - 99%)    PHYSICAL EXAM:  GENERAL: NAD, well-groomed, well-developed  HEAD:  Atraumatic, Normocephalic  EYES: EOMI, PERRLA, conjunctiva and sclera clear  ENMT: No tonsillar erythema, exudates, or enlargement; Moist mucous membranes, Good dentition, No lesions  NECK: Supple, No JVD, Normal thyroid  NERVOUS SYSTEM:  Alert & Oriented X3, Good concentration; Motor Strength 5/5 B/L upper and lower extremities; DTRs 2+ intact and symmetric  CHEST/LUNG: Clear to auscultation bilaterally; No rales, rhonchi, wheezing, or rubs  HEART: Regular rate and rhythm; No murmurs, rubs, or gallops  ABDOMEN: Soft, Nontender, Nondistended; Bowel sounds present  EXTREMITIES:  2+ Peripheral Pulses, No clubbing, cyanosis, or edema  LYMPH: No lymphadenopathy noted  SKIN: No rashes or lesions    LABS:                        9.7    12.96 )-----------( 271      ( 05 Jul 2020 07:12 )             31.6     05 Jul 2020 07:12    140    |  108    |  10     ----------------------------<  76     4.1     |  28     |  0.57     Ca    8.5        05 Jul 2020 07:12  Mg     2.1       05 Jul 2020 07:12          CAPILLARY BLOOD GLUCOSE          RADIOLOGY & ADDITIONAL TESTS:    Imaging Personally Reviewed:  [ ] YES  [ ] NO    Consultant(s) Notes Reviewed:  [ ] YES  [ ] NO    Care Discussed with Consultants/Other Providers [ ] YES  [ ] NO

## 2020-07-05 NOTE — PROGRESS NOTE ADULT - ASSESSMENT
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  -seen by surgery, for palliative colon resection   -discussed w pt, who is aware of significance of findings.    s/p colon resection 7/2/2020:  continues to do well post operativel    RECOMMEND:   1.  continue post op care  2.  follow CBC and transfuse as needed  3.  check pathology and will need biomarker studies  4.  physical therapy evaluation  4.  further heme onc recommendations pending above

## 2020-07-05 NOTE — PROGRESS NOTE ADULT - ASSESSMENT
80M, HTN, HL, hypothy, CAD/stents, RA/psoriatic arthritis/plaque psoriasis, hx prostate cancer/brachiotherapy 2009, chronic urinary retention/intermittent self-caths - mgmt for GIB/acute blood loss anemia, near-obstructing colonic mass - c/f malignancy - s/p Vira colectomy procedure 7/2  -s/p OR for Kang procedure 7/2 - postop mgmt per Surgery  -f/u path results - HemeOnc to determine palliative tx options   -trending hgb for stability and need for supportive transfusions - hgb 9s currently  -prn analgesia  -leukocytosis - likely reactive; no jaylin e/o acute infection - monitoring off abxs for now  -HTN, HL, CAD - continue metoprolol, hctz, lipitor  -/chronic urinary retention - continue finasteride, flomax; Perez in place currently  -hypothy - continue synthroid  -dvt prophy  -dispo - likely will need HITESH placement at dispo given new colostomy, Perez, and need for assistive resources  -PT mobilization  Hypokalemia- resolved

## 2020-07-05 NOTE — PROGRESS NOTE ADULT - SUBJECTIVE AND OBJECTIVE BOX
Interval History:  continues to do well post op    Chart reviewed and events noted;   Overnight events:    MEDICATIONS  (STANDING):  enoxaparin Injectable 40 milliGRAM(s) SubCutaneous daily  ferrous    sulfate 325 milliGRAM(s) Oral daily  finasteride 5 milliGRAM(s) Oral daily  fluticasone propionate 50 MICROgram(s)/spray Nasal Spray 2 Spray(s) Both Nostrils <User Schedule>  hydrochlorothiazide 12.5 milliGRAM(s) Oral daily  levothyroxine 25 MICROGram(s) Oral daily  loratadine 10 milliGRAM(s) Oral daily  metoprolol succinate  milliGRAM(s) Oral daily  montelukast 10 milliGRAM(s) Oral at bedtime  pantoprazole    Tablet 40 milliGRAM(s) Oral before breakfast  potassium chloride   Powder 40 milliEquivalent(s) Oral two times a day  predniSONE   Tablet 1 milliGRAM(s) Oral four times a day  senna 2 Tablet(s) Oral at bedtime  simvastatin 40 milliGRAM(s) Oral at bedtime  tamsulosin 0.4 milliGRAM(s) Oral at bedtime    MEDICATIONS  (PRN):  acetaminophen   Tablet .. 650 milliGRAM(s) Oral every 6 hours PRN Temp greater or equal to 38C (100.4F), Mild Pain (1 - 3)  HYDROmorphone  Injectable 0.5 milliGRAM(s) IV Push every 3 hours PRN Severe Pain (7 - 10)  ibuprofen  Tablet. 400 milliGRAM(s) Oral every 6 hours PRN Temp greater or equal to 38.5C (101.3F), Moderate Pain (4 - 6)      Vital Signs Last 24 Hrs  T(C): 36.8 (05 Jul 2020 05:06), Max: 37 (04 Jul 2020 13:01)  T(F): 98.3 (05 Jul 2020 05:06), Max: 98.6 (04 Jul 2020 13:01)  HR: 71 (05 Jul 2020 05:06) (64 - 71)  BP: 147/73 (05 Jul 2020 05:06) (123/68 - 147/73)  BP(mean): --  RR: 17 (05 Jul 2020 05:06) (17 - 18)  SpO2: 97% (05 Jul 2020 08:08) (96% - 99%)    PHYSICAL EXAM  General: adult in NAD  HEENT: clear oropharynx, anicteric sclera, pink conjunctivae  Neck: supple  CV: normal S1S2 with no murmur rubs or gallops  Lungs: clear to auscultation, no wheezes, no rhales  Abdomen: soft non-tender non-distended, no hepato/splenomegaly. colostomy  Ext: no clubbing cyanosis or edema  Skin: no rashes and no petichiae  Neuro: alert and oriented X3 no focal deficits      LABS:  CBC Full  -  ( 05 Jul 2020 07:12 )  WBC Count : 12.96 K/uL  RBC Count : 3.62 M/uL  Hemoglobin : 9.7 g/dL  Hematocrit : 31.6 %  Platelet Count - Automated : 271 K/uL  Mean Cell Volume : 87.3 fl  Mean Cell Hemoglobin : 26.8 pg  Mean Cell Hemoglobin Concentration : 30.7 gm/dL  Auto Neutrophil # : 10.90 K/uL  Auto Lymphocyte # : 0.56 K/uL  Auto Monocyte # : 1.28 K/uL  Auto Eosinophil # : 0.09 K/uL  Auto Basophil # : 0.02 K/uL  Auto Neutrophil % : 84.1 %  Auto Lymphocyte % : 4.3 %  Auto Monocyte % : 9.9 %  Auto Eosinophil % : 0.7 %  Auto Basophil % : 0.2 %    07-05    140  |  108  |  10  ----------------------------<  76  4.1   |  28  |  0.57    Ca    8.5      05 Jul 2020 07:12  Phos  2.6     07-04  Mg     2.1     07-05          fe studies      WBC trend  12.96 K/uL (07-05-20 @ 07:12)  14.28 K/uL (07-04-20 @ 08:22)  14.34 K/uL (07-03-20 @ 06:48)      Hgb trend  9.7 g/dL (07-05-20 @ 07:12)  10.2 g/dL (07-04-20 @ 08:22)  9.6 g/dL (07-03-20 @ 06:48)      plt trend  271 K/uL (07-05-20 @ 07:12)  289 K/uL (07-04-20 @ 08:22)  295 K/uL (07-03-20 @ 06:48)        RADIOLOGY & ADDITIONAL STUDIES:

## 2020-07-05 NOTE — PROGRESS NOTE ADULT - ASSESSMENT
81y/o M PMH of CAD, HTN, HLD, prostate cancer (seed radiation 2009), MI (nov 2000, angioplasty w 3 stents to the right coronary artery), rheumatoid and psoriatic arthritis, plaque psoriasis, b/l hip replacements (left 1998, right 2002), C diff in 2014, urinary retention with self catheretization hypothyroidism, mild asthma, melanoma in situ presents for GIB. Pt states he noticed right red blood in his stools for 6 months. Pt has been feeling weakness, weight loss of 19 pounds since June 2019. Pt sent in by outpatient hematologist Dr. Bregeron given low Hgb outpatient. (6.8 on 6/24) Pt denies SOB or GEORGES, however feels weak when pushing heavy objects. Pt was scheduled with his GI doctor Dr. Mullen for outpatient colonoscopy in March 2020. However, colonoscopy canceled due to COVID19. Pt self catheterizes 5-6 times a day.    Sigmoid mass   - Colonoscopy revealed large fungating near obstructing sigmoid colon mass.   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  S/P Laparoscopic mobilization of splenic flexure of colon and Laparoscopic Vira colectomy 02-Jul-2020   - Continue Perez   - Manage per surgery/Heme/Primary/GI    CAD S/P stent  - EKG is unchanged when compared to prior  - Pt w/o anginal complaints  - There is no evidence of significant arrhythmia.  - There is no evidence for meaningful  volume overload.  - TTE 12/1/18 mild AS, LVD,45 %, no need to repeat  - Continue Toprol  mg PO daily  - Continue Statin     HTN  - controlled  - Continue BB at home dose  - Continue HCTZ  - Monitor routine hemodynamics.     HLD  - Continue statin    Anemia  - S/P PRBCs  - Trend CBC  - Would transfuse to keep Hb>8 and watch volume status     MARY  - Creatinine improved   - resolved     - Monitor and replete lytes, keep K>4, Mg>2. - Magnesium 1.5 replaced.  F/U labs.  Cont tele with NSR and PACs/PVCs    - All other medical needs as per primary team.  - Other cardiovascular workup will depend on clinical course.  - Will continue to follow.      Rubin Justice DNP, ANP-c  Cardiology   Spectra #[id number] [phone]

## 2020-07-05 NOTE — PROGRESS NOTE ADULT - SUBJECTIVE AND OBJECTIVE BOX
Date/Time Patient Seen:  		  Referring MD:   Data Reviewed	       Patient is a 80y old  Male who presents with a chief complaint of GI bleeding (04 Jul 2020 12:54)      Subjective/HPI     PAST MEDICAL & SURGICAL HISTORY:  Asthma  Melanoma in situ  Hypothyroid  H/O Clostridium difficile infection  Urinary retention  Plaque psoriasis  History of urinary self-catheterization  Arthritis  Prostate CA: seed therapy aug 2009  Hyperlipidemia  Hypertension  CAD (coronary artery disease)  Acute MI  S/P tonsillectomy  H/O hernia repair  S/P cardiac cath  No significant past surgical history        Medication list         MEDICATIONS  (STANDING):  enoxaparin Injectable 40 milliGRAM(s) SubCutaneous daily  ferrous    sulfate 325 milliGRAM(s) Oral daily  finasteride 5 milliGRAM(s) Oral daily  fluticasone propionate 50 MICROgram(s)/spray Nasal Spray 2 Spray(s) Both Nostrils <User Schedule>  hydrochlorothiazide 12.5 milliGRAM(s) Oral daily  lactated ringers. 1000 milliLiter(s) (50 mL/Hr) IV Continuous <Continuous>  levothyroxine 25 MICROGram(s) Oral daily  loratadine 10 milliGRAM(s) Oral daily  metoprolol succinate  milliGRAM(s) Oral daily  montelukast 10 milliGRAM(s) Oral at bedtime  pantoprazole    Tablet 40 milliGRAM(s) Oral before breakfast  potassium chloride   Powder 40 milliEquivalent(s) Oral two times a day  predniSONE   Tablet 1 milliGRAM(s) Oral four times a day  senna 2 Tablet(s) Oral at bedtime  simvastatin 40 milliGRAM(s) Oral at bedtime  tamsulosin 0.4 milliGRAM(s) Oral at bedtime    MEDICATIONS  (PRN):  acetaminophen   Tablet .. 650 milliGRAM(s) Oral every 6 hours PRN Temp greater or equal to 38C (100.4F), Mild Pain (1 - 3)  HYDROmorphone  Injectable 0.5 milliGRAM(s) IV Push every 3 hours PRN Severe Pain (7 - 10)  ibuprofen  Tablet. 400 milliGRAM(s) Oral every 6 hours PRN Temp greater or equal to 38.5C (101.3F), Moderate Pain (4 - 6)         Vitals log        ICU Vital Signs Last 24 Hrs  T(C): 36.8 (05 Jul 2020 05:06), Max: 37 (04 Jul 2020 13:01)  T(F): 98.3 (05 Jul 2020 05:06), Max: 98.6 (04 Jul 2020 13:01)  HR: 71 (05 Jul 2020 05:06) (60 - 71)  BP: 147/73 (05 Jul 2020 05:06) (123/68 - 147/73)  BP(mean): --  ABP: --  ABP(mean): --  RR: 17 (05 Jul 2020 05:06) (16 - 18)  SpO2: 96% (05 Jul 2020 05:06) (96% - 99%)           Input and Output:  I&O's Detail    03 Jul 2020 07:01  -  04 Jul 2020 07:00  --------------------------------------------------------  IN:    lactated ringers.: 550 mL  Total IN: 550 mL    OUT:    Ureteral Catheter: 1200 mL  Total OUT: 1200 mL    Total NET: -650 mL      04 Jul 2020 07:01  -  05 Jul 2020 06:13  --------------------------------------------------------  IN:    Oral Fluid: 680 mL  Total IN: 680 mL    OUT:    Colostomy: 200 mL    Ureteral Catheter: 2350 mL  Total OUT: 2550 mL    Total NET: -1870 mL          Lab Data                        10.2   14.28 )-----------( 289      ( 04 Jul 2020 08:22 )             32.1     07-04    x   |  x   |  x   ----------------------------<  x   4.1   |  x   |  x     Ca    8.2<L>      04 Jul 2020 08:22  Phos  2.6     07-04  Mg     1.7     07-04              Review of Systems	      Objective     Physical Examination    heart s1s2  lung dc BS  abd soft      Pertinent Lab findings & Imaging      Ana:  NO   Adequate UO     I&O's Detail    03 Jul 2020 07:01  -  04 Jul 2020 07:00  --------------------------------------------------------  IN:    lactated ringers.: 550 mL  Total IN: 550 mL    OUT:    Ureteral Catheter: 1200 mL  Total OUT: 1200 mL    Total NET: -650 mL      04 Jul 2020 07:01  -  05 Jul 2020 06:13  --------------------------------------------------------  IN:    Oral Fluid: 680 mL  Total IN: 680 mL    OUT:    Colostomy: 200 mL    Ureteral Catheter: 2350 mL  Total OUT: 2550 mL    Total NET: -1870 mL               Discussed with:     Cultures:	        Radiology

## 2020-07-05 NOTE — PROGRESS NOTE ADULT - SUBJECTIVE AND OBJECTIVE BOX
STATUS POST:  lap Vira's    POST OPERATIVE DAY #: 3    SUBJECTIVE:  Patient seen and examined at bedside.  No overnight events.  Patient with no new complaints at this time, tolerating full liquid diet, admits to air and stool in bag.  Patient denies any fevers, chills, chest pain, shortness of breath, abdominal pain, nausea, vomiting or diarrhea.    Vital Signs Last 24 Hrs  T(C): 36.8 (05 Jul 2020 05:06), Max: 37 (04 Jul 2020 13:01)  T(F): 98.3 (05 Jul 2020 05:06), Max: 98.6 (04 Jul 2020 13:01)  HR: 71 (05 Jul 2020 05:06) (60 - 71)  BP: 147/73 (05 Jul 2020 05:06) (123/68 - 147/73)  BP(mean): --  RR: 17 (05 Jul 2020 05:06) (16 - 18)  SpO2: 97% (05 Jul 2020 08:08) (96% - 99%)    PHYSICAL EXAM:  GENERAL: No acute distress, well-developed  HEAD:  Atraumatic, Normocephalic  ABDOMEN: Soft, minimal incisional tenderness around ostomy, non-distended; bowel sounds+ ostomy pink, patent and edematous.  NEUROLOGY: A&O x 3, no focal deficits    I&O's Summary    04 Jul 2020 07:01  -  05 Jul 2020 07:00  --------------------------------------------------------  IN: 680 mL / OUT: 2550 mL / NET: -1870 mL      I&O's Detail    04 Jul 2020 07:01  -  05 Jul 2020 07:00  --------------------------------------------------------  IN:    Oral Fluid: 680 mL  Total IN: 680 mL    OUT:    Colostomy: 200 mL    Ureteral Catheter: 2350 mL  Total OUT: 2550 mL    Total NET: -1870 mL    MEDICATIONS  (STANDING):  enoxaparin Injectable 40 milliGRAM(s) SubCutaneous daily  ferrous    sulfate 325 milliGRAM(s) Oral daily  finasteride 5 milliGRAM(s) Oral daily  fluticasone propionate 50 MICROgram(s)/spray Nasal Spray 2 Spray(s) Both Nostrils <User Schedule>  hydrochlorothiazide 12.5 milliGRAM(s) Oral daily  levothyroxine 25 MICROGram(s) Oral daily  loratadine 10 milliGRAM(s) Oral daily  metoprolol succinate  milliGRAM(s) Oral daily  montelukast 10 milliGRAM(s) Oral at bedtime  pantoprazole    Tablet 40 milliGRAM(s) Oral before breakfast  potassium chloride   Powder 40 milliEquivalent(s) Oral two times a day  predniSONE   Tablet 1 milliGRAM(s) Oral four times a day  senna 2 Tablet(s) Oral at bedtime  simvastatin 40 milliGRAM(s) Oral at bedtime  tamsulosin 0.4 milliGRAM(s) Oral at bedtime    MEDICATIONS  (PRN):  acetaminophen   Tablet .. 650 milliGRAM(s) Oral every 6 hours PRN Temp greater or equal to 38C (100.4F), Mild Pain (1 - 3)  HYDROmorphone  Injectable 0.5 milliGRAM(s) IV Push every 3 hours PRN Severe Pain (7 - 10)  ibuprofen  Tablet. 400 milliGRAM(s) Oral every 6 hours PRN Temp greater or equal to 38.5C (101.3F), Moderate Pain (4 - 6)    LABS:                        9.7    12.96 )-----------( 271      ( 05 Jul 2020 07:12 )             31.6     07-05    140  |  108  |  10  ----------------------------<  76  4.1   |  28  |  0.57    Ca    8.5      05 Jul 2020 07:12  Phos  2.6     07-04  Mg     2.1     07-05    RADIOLOGY & ADDITIONAL STUDIES:  no new    ASSESSMENT    80y Male POD 3 s/p benito Vira's currently doing well with ostomy function    PLAN  - Will discuss with Dr. Shepherd  - adv to REG diet, d/c IVF  - leukocytosis downtrending  - paez per urology  - incentive spirometer  - pain control  - OOB  - serial abdominal exams  - labs in am    Surgical Team Contact Information  Spectralink: Ext: [phone] or [phone]  Pager: [phone]

## 2020-07-06 NOTE — PROGRESS NOTE ADULT - SUBJECTIVE AND OBJECTIVE BOX
STATUS POST:  lap Vira's    POST OPERATIVE DAY #: 4    SUBJECTIVE:  Patient seen and examined at bedside. Patient with no new complaints at this time, tolerating low residue diet, admits to flatus and stool in bag. Patient denies any fevers, chills, chest pain, shortness of breath, nausea, vomiting or diarrhea.    Vital Signs Last 24 Hrs  T(C): 37 (06 Jul 2020 05:30), Max: 37 (06 Jul 2020 05:30)  T(F): 98.6 (06 Jul 2020 05:30), Max: 98.6 (06 Jul 2020 05:30)  HR: 80 (06 Jul 2020 05:30) (70 - 87)  BP: 131/64 (06 Jul 2020 05:30) (131/64 - 140/78)  BP(mean): --  RR: 17 (06 Jul 2020 05:30) (17 - 17)  SpO2: 93% (06 Jul 2020 05:30) (93% - 96%)    PHYSICAL EXAM:  GENERAL: No acute distress, well-developed  HEAD:  Atraumatic, Normocephalic  ABDOMEN: Soft, minimal incisional tenderness around ostomy, non-distended; bowel sounds+ ostomy pink, patent and edematous with air and stool in bag.  NEUROLOGY: A&O x 3, no focal deficits    I&O's Summary    05 Jul 2020 07:01  -  06 Jul 2020 07:00  --------------------------------------------------------  IN: 320 mL / OUT: 2400 mL / NET: -2080 mL      I&O's Detail    05 Jul 2020 07:01  -  06 Jul 2020 07:00  --------------------------------------------------------  IN:    Oral Fluid: 320 mL  Total IN: 320 mL    OUT:    Colostomy: 450 mL    Ureteral Catheter: 1950 mL  Total OUT: 2400 mL    Total NET: -2080 mL    MEDICATIONS  (STANDING):  enoxaparin Injectable 40 milliGRAM(s) SubCutaneous daily  ferrous    sulfate 325 milliGRAM(s) Oral daily  finasteride 5 milliGRAM(s) Oral daily  fluticasone propionate 50 MICROgram(s)/spray Nasal Spray 2 Spray(s) Both Nostrils <User Schedule>  hydrochlorothiazide 12.5 milliGRAM(s) Oral daily  lactase Tablet 1 Tablet(s) Oral three times a day with meals  levothyroxine 25 MICROGram(s) Oral daily  loratadine 10 milliGRAM(s) Oral daily  metoprolol succinate  milliGRAM(s) Oral daily  montelukast 10 milliGRAM(s) Oral at bedtime  pantoprazole    Tablet 40 milliGRAM(s) Oral before breakfast  predniSONE   Tablet 1 milliGRAM(s) Oral four times a day  senna 2 Tablet(s) Oral at bedtime  simvastatin 40 milliGRAM(s) Oral at bedtime  tamsulosin 0.4 milliGRAM(s) Oral at bedtime    MEDICATIONS  (PRN):  acetaminophen   Tablet .. 650 milliGRAM(s) Oral every 6 hours PRN Temp greater or equal to 38C (100.4F), Mild Pain (1 - 3)  HYDROmorphone  Injectable 0.5 milliGRAM(s) IV Push every 3 hours PRN Severe Pain (7 - 10)  ibuprofen  Tablet. 400 milliGRAM(s) Oral every 6 hours PRN Temp greater or equal to 38.5C (101.3F), Moderate Pain (4 - 6)    LABS:                        9.4    12.39 )-----------( 319      ( 06 Jul 2020 09:45 )             29.5     07-05    140  |  108  |  10  ----------------------------<  76  4.1   |  28  |  0.57    Ca    8.5      05 Jul 2020 07:12  Mg     2.1     07-05    RADIOLOGY & ADDITIONAL STUDIES:  no new    ASSESSMENT    80y Male POD 4 s/p lap Vira's currently with ostomy function awaiting d/c to rehab per medicine.    PLAN  - Will discuss with Dr. Shepherd  - d/c planning per medicine  - stable and cleared for d/c from a surgical perspective  - leukocytosis downtrending  - incentive spirometer  - pain control  - OOB/PT  - serial abdominal exams    Surgical Team Contact Information  Spectralink: Ext: [phone] or [phone]  Pager: [phone]

## 2020-07-06 NOTE — DISCHARGE NOTE PROVIDER - CARE PROVIDERS DIRECT ADDRESSES
[email or web address]

## 2020-07-06 NOTE — DISCHARGE NOTE PROVIDER - EXTENDED VTE YES NO FOR MLM ENOXAPARIN
Routing refill request to provider for review/approval because:  Medication is reported/historical    LOV: 1/15/19  Jessica Guillermo RN on 2/4/2019 at 2:38 PM           ,

## 2020-07-06 NOTE — PROGRESS NOTE ADULT - PROBLEM SELECTOR PLAN 1
s/p Laparoscopic mobilization of splenic flexure of colon - Laparoscopic Vira colectomy  seen and examined - VS noted - labs reviewed - Surgery follow up noted reviewed  JEWELL andujar noted -   I gerardo  dvt p  serial abd exam and stoma care  assist with ADL  out of bed  asthma - I gerardo - monitor Sat - wean off o2 support as tolerated - on Flonase and Singulair -   caution with Opioids  curr on Reg Diet  reassurance and emotional support  planned for HITESH dc - see CM notes

## 2020-07-06 NOTE — PROGRESS NOTE ADULT - REASON FOR ADMISSION
GI bleeding
GI bleeding, anemia
anemia
GI bleeding
GI bleed
GI bleeding
GI bleeding
Near obstructing sigmoid mass
Anemia
GI bleeding
GIB, colon cancer
acute blood loss
anemia
Bleeding
anemia
gi bleed

## 2020-07-06 NOTE — PROGRESS NOTE ADULT - SUBJECTIVE AND OBJECTIVE BOX
[facility] -- Noe Land, Jamel Sung, Yuri Ruvalcaba Savella, [place]ger  Office # [phone]    Follow Up:    GI Bleed; CAD  Subjective/Observations:   Patient seen and examined. He enjoys pleasant, intelligent conversation. He reports he is tired, fatigued, tolerating small amounts of food. He denies SOB or CP at this time.    REVIEW OF SYSTEMS: All other review of systems is negative unless indicated above  PAST MEDICAL & SURGICAL HISTORY:  Asthma  Melanoma in situ  Hypothyroid  H/O Clostridium difficile infection  Urinary retention  Plaque psoriasis  History of urinary self-catheterization  Arthritis  Prostate CA: seed therapy aug 2009  Hyperlipidemia  Hypertension  CAD (coronary artery disease)  Acute MI  S/P tonsillectomy  H/O hernia repair  S/P cardiac cath    MEDICATIONS  (STANDING):  enoxaparin Injectable 40 milliGRAM(s) SubCutaneous daily  ferrous    sulfate 325 milliGRAM(s) Oral daily  finasteride 5 milliGRAM(s) Oral daily  fluticasone propionate 50 MICROgram(s)/spray Nasal Spray 2 Spray(s) Both Nostrils <User Schedule>  hydrochlorothiazide 12.5 milliGRAM(s) Oral daily  lactase Tablet 1 Tablet(s) Oral three times a day with meals  levothyroxine 25 MICROGram(s) Oral daily  loratadine 10 milliGRAM(s) Oral daily  metoprolol succinate  milliGRAM(s) Oral daily  montelukast 10 milliGRAM(s) Oral at bedtime  pantoprazole    Tablet 40 milliGRAM(s) Oral before breakfast  predniSONE   Tablet 1 milliGRAM(s) Oral four times a day  senna 2 Tablet(s) Oral at bedtime  simvastatin 40 milliGRAM(s) Oral at bedtime  tamsulosin 0.4 milliGRAM(s) Oral at bedtime    MEDICATIONS  (PRN):  acetaminophen   Tablet .. 650 milliGRAM(s) Oral every 6 hours PRN Temp greater or equal to 38C (100.4F), Mild Pain (1 - 3)  HYDROmorphone  Injectable 0.5 milliGRAM(s) IV Push every 3 hours PRN Severe Pain (7 - 10)  ibuprofen  Tablet. 400 milliGRAM(s) Oral every 6 hours PRN Temp greater or equal to 38.5C (101.3F), Moderate Pain (4 - 6)    Allergies    cephalexin (Urticaria)  clindamycin (Diarrhea)  erythromycin (Other)  lactose (Stomach Upset)  Methotrexate Sodium (Unknown)  penicillins (Hives)  sporalin (oxypsoralin/PUVA) (Other)  sulfa drugs (Rash)    Intolerances      Vital Signs Last 24 Hrs  T(C): 37 (06 Jul 2020 05:30), Max: 37 (06 Jul 2020 05:30)  T(F): 98.6 (06 Jul 2020 05:30), Max: 98.6 (06 Jul 2020 05:30)  HR: 80 (06 Jul 2020 05:30) (70 - 87)  BP: 131/64 (06 Jul 2020 05:30) (131/64 - 139/78)  BP(mean): --  RR: 17 (06 Jul 2020 05:30) (17 - 17)  SpO2: 93% (06 Jul 2020 05:30) (93% - 96%)  I&O's Summary    05 Jul 2020 07:01  -  06 Jul 2020 07:00  --------------------------------------------------------  IN: 320 mL / OUT: 2400 mL / NET: -2080 mL        PHYSICAL EXAM:  TELE: off  Constitutional: NAD  HEENT: Moist Mucous Membranes, Anicteric  Pulmonary: Non-labored, breath sounds are clear bilaterally, No wheezing, rales or rhonchi  Cardiovascular: Regular, S1 and S2, No murmurs, rubs, gallops or clicks  Gastrointestinal: +BS with colostomy  Lymph: No peripheral edema. No lymphadenopathy.  Skin: No visible rashes or ulcers.  Psych:  Mood & affect appropriate  LABS: All Labs Reviewed:                        9.4    12.39 )-----------( 319      ( 06 Jul 2020 09:45 )             29.5                         9.7    12.96 )-----------( 271      ( 05 Jul 2020 07:12 )             31.6                         10.2   14.28 )-----------( 289      ( 04 Jul 2020 08:22 )             32.1     06 Jul 2020 09:45    139    |  107    |  18     ----------------------------<  99     4.2     |  24     |  0.74   05 Jul 2020 07:12    140    |  108    |  10     ----------------------------<  76     4.1     |  28     |  0.57   04 Jul 2020 16:42    x      |  x      |  x      ----------------------------<  x      4.1     |  x      |  x        Ca    8.4        06 Jul 2020 09:45  Ca    8.5        05 Jul 2020 07:12  Ca    8.2        04 Jul 2020 08:22  Phos  2.6       04 Jul 2020 08:22  Mg     2.1       05 Jul 2020 07:12  Mg     1.7       04 Jul 2020 08:22

## 2020-07-06 NOTE — PROGRESS NOTE ADULT - SUBJECTIVE AND OBJECTIVE BOX
Patient is a 80y old  Male who presents with a chief complaint of anemia (06 Jul 2020 09:47)      INTERVAL HPI/OVERNIGHT EVENTS: 80M, HTN, HL, hypothy, CAD/stents, RA/psoriatic arthritis/plaque psoriasis, hx prostate cancer/brachiotherapy 2009, chronic urinary retention/intermittent self-caths - mgmt for GIB/acute blood loss anemia, near-obstructing colonic mass - c/f malignancy - s/p Vira colectomy procedure 7/2. pt c/o dysuria,     MEDICATIONS  (STANDING):  enoxaparin Injectable 40 milliGRAM(s) SubCutaneous daily  ferrous    sulfate 325 milliGRAM(s) Oral daily  finasteride 5 milliGRAM(s) Oral daily  fluticasone propionate 50 MICROgram(s)/spray Nasal Spray 2 Spray(s) Both Nostrils <User Schedule>  hydrochlorothiazide 12.5 milliGRAM(s) Oral daily  lactase Tablet 1 Tablet(s) Oral three times a day with meals  levothyroxine 25 MICROGram(s) Oral daily  loratadine 10 milliGRAM(s) Oral daily  metoprolol succinate  milliGRAM(s) Oral daily  montelukast 10 milliGRAM(s) Oral at bedtime  pantoprazole    Tablet 40 milliGRAM(s) Oral before breakfast  predniSONE   Tablet 1 milliGRAM(s) Oral four times a day  senna 2 Tablet(s) Oral at bedtime  simvastatin 40 milliGRAM(s) Oral at bedtime  tamsulosin 0.4 milliGRAM(s) Oral at bedtime    MEDICATIONS  (PRN):  acetaminophen   Tablet .. 650 milliGRAM(s) Oral every 6 hours PRN Temp greater or equal to 38C (100.4F), Mild Pain (1 - 3)  HYDROmorphone  Injectable 0.5 milliGRAM(s) IV Push every 3 hours PRN Severe Pain (7 - 10)  ibuprofen  Tablet. 400 milliGRAM(s) Oral every 6 hours PRN Temp greater or equal to 38.5C (101.3F), Moderate Pain (4 - 6)      Allergies    cephalexin (Urticaria)  clindamycin (Diarrhea)  erythromycin (Other)  lactose (Stomach Upset)  Methotrexate Sodium (Unknown)  penicillins (Hives)  sporalin (oxypsoralin/PUVA) (Other)  sulfa drugs (Rash)    Intolerances        REVIEW OF SYSTEMS:  CONSTITUTIONAL: No fever, weight loss, or fatigue  EYES: No eye pain, visual disturbances, or discharge  ENMT:  No difficulty hearing, tinnitus, vertigo; No sinus or throat pain  NECK: No pain or stiffness  BREASTS: No pain, masses, or nipple discharge  RESPIRATORY: No cough, wheezing, chills or hemoptysis; No shortness of breath  CARDIOVASCULAR: No chest pain, palpitations, dizziness, or leg swelling  GASTROINTESTINAL: No abdominal or epigastric pain. No nausea, vomiting, or hematemesis; No diarrhea or constipation. No melena or hematochezia.  GENITOURINARY: No dysuria, frequency, hematuria, or incontinence  NEUROLOGICAL: No headaches, memory loss, loss of strength, numbness, or tremors  SKIN: No itching, burning, rashes, or lesions   LYMPH NODES: No enlarged glands  ENDOCRINE: No heat or cold intolerance; No hair loss; No polydipsia or polyuria  MUSCULOSKELETAL: No joint pain or swelling; No muscle, back, or extremity pain  PSYCHIATRIC: No depression, anxiety, mood swings, or difficulty sleeping  HEME/LYMPH: No easy bruising, or bleeding gums  ALLERGY AND IMMUNOLOGIC: No hives or eczema    Vital Signs Last 24 Hrs  T(C): 37 (06 Jul 2020 05:30), Max: 37 (06 Jul 2020 05:30)  T(F): 98.6 (06 Jul 2020 05:30), Max: 98.6 (06 Jul 2020 05:30)  HR: 80 (06 Jul 2020 05:30) (70 - 87)  BP: 131/64 (06 Jul 2020 05:30) (131/64 - 139/78)  BP(mean): --  RR: 17 (06 Jul 2020 05:30) (17 - 17)  SpO2: 93% (06 Jul 2020 05:30) (93% - 96%)    PHYSICAL EXAM:  GENERAL: NAD, well-groomed, well-developed  HEAD:  Atraumatic, Normocephalic  EYES: EOMI, PERRLA, conjunctiva and sclera clear  ENMT: No tonsillar erythema, exudates, or enlargement; Moist mucous membranes, Good dentition, No lesions  NECK: Supple, No JVD, Normal thyroid  NERVOUS SYSTEM:  Alert & Oriented X3, Good concentration; Motor Strength 5/5 B/L upper and lower extremities; DTRs 2+ intact and symmetric  CHEST/LUNG: Clear to auscultation bilaterally; No rales, rhonchi, wheezing, or rubs  HEART: Regular rate and rhythm; No murmurs, rubs, or gallops  ABDOMEN: Soft, Nontender, Nondistended; Bowel sounds present  EXTREMITIES:  2+ Peripheral Pulses, No clubbing, cyanosis, or edema  LYMPH: No lymphadenopathy noted  SKIN: No rashes or lesions    LABS:                        9.4    12.39 )-----------( 319      ( 06 Jul 2020 09:45 )             29.5     06 Jul 2020 09:45    139    |  107    |  18     ----------------------------<  99     4.2     |  24     |  0.74     Ca    8.4        06 Jul 2020 09:45          CAPILLARY BLOOD GLUCOSE          RADIOLOGY & ADDITIONAL TESTS:    Imaging Personally Reviewed:  [ ] YES  [ ] NO    Consultant(s) Notes Reviewed:  [ ] YES  [ ] NO    Care Discussed with Consultants/Other Providers [ ] YES  [ ] NO

## 2020-07-06 NOTE — PROGRESS NOTE ADULT - ATTENDING COMMENTS
I have personally seen, examined, and participated in the care of this patient. I have reviewed all pertinent clinical information, including history, physical exam, plan, and the PA's note and agree except as noted.    Stoma functioning well.  Tolerating diet.  No additional acute surgical interventions indicated.  Continue follow up with Heme/Onc for metastatic disease management.  No contraindication to discharge home once home care arrangements for Stoma care.  Continue regular diet as tolerated.  Routine stoma care.

## 2020-07-06 NOTE — PROGRESS NOTE ADULT - SUBJECTIVE AND OBJECTIVE BOX
Date/Time Patient Seen:  		  Referring MD:   Data Reviewed	       Patient is a 80y old  Male who presents with a chief complaint of GI bleeding (05 Jul 2020 12:09)      Subjective/HPI     PAST MEDICAL & SURGICAL HISTORY:  Asthma  Melanoma in situ  Hypothyroid  H/O Clostridium difficile infection  Urinary retention  Plaque psoriasis  History of urinary self-catheterization  Arthritis  Prostate CA: seed therapy aug 2009  Hyperlipidemia  Hypertension  CAD (coronary artery disease)  Acute MI  S/P tonsillectomy  H/O hernia repair  S/P cardiac cath  No significant past surgical history        Medication list         MEDICATIONS  (STANDING):  enoxaparin Injectable 40 milliGRAM(s) SubCutaneous daily  ferrous    sulfate 325 milliGRAM(s) Oral daily  finasteride 5 milliGRAM(s) Oral daily  fluticasone propionate 50 MICROgram(s)/spray Nasal Spray 2 Spray(s) Both Nostrils <User Schedule>  hydrochlorothiazide 12.5 milliGRAM(s) Oral daily  levothyroxine 25 MICROGram(s) Oral daily  loratadine 10 milliGRAM(s) Oral daily  metoprolol succinate  milliGRAM(s) Oral daily  montelukast 10 milliGRAM(s) Oral at bedtime  pantoprazole    Tablet 40 milliGRAM(s) Oral before breakfast  potassium chloride   Powder 40 milliEquivalent(s) Oral two times a day  predniSONE   Tablet 1 milliGRAM(s) Oral four times a day  senna 2 Tablet(s) Oral at bedtime  simvastatin 40 milliGRAM(s) Oral at bedtime  tamsulosin 0.4 milliGRAM(s) Oral at bedtime    MEDICATIONS  (PRN):  acetaminophen   Tablet .. 650 milliGRAM(s) Oral every 6 hours PRN Temp greater or equal to 38C (100.4F), Mild Pain (1 - 3)  HYDROmorphone  Injectable 0.5 milliGRAM(s) IV Push every 3 hours PRN Severe Pain (7 - 10)  ibuprofen  Tablet. 400 milliGRAM(s) Oral every 6 hours PRN Temp greater or equal to 38.5C (101.3F), Moderate Pain (4 - 6)         Vitals log        ICU Vital Signs Last 24 Hrs  T(C): 37 (06 Jul 2020 05:30), Max: 37 (06 Jul 2020 05:30)  T(F): 98.6 (06 Jul 2020 05:30), Max: 98.6 (06 Jul 2020 05:30)  HR: 80 (06 Jul 2020 05:30) (70 - 87)  BP: 131/64 (06 Jul 2020 05:30) (131/64 - 140/78)  BP(mean): --  ABP: --  ABP(mean): --  RR: 17 (06 Jul 2020 05:30) (17 - 17)  SpO2: 93% (06 Jul 2020 05:30) (93% - 97%)           Input and Output:  I&O's Detail    04 Jul 2020 07:01  -  05 Jul 2020 07:00  --------------------------------------------------------  IN:    Oral Fluid: 680 mL  Total IN: 680 mL    OUT:    Colostomy: 200 mL    Ureteral Catheter: 2350 mL  Total OUT: 2550 mL    Total NET: -1870 mL      05 Jul 2020 07:01  -  06 Jul 2020 06:50  --------------------------------------------------------  IN:    Oral Fluid: 320 mL  Total IN: 320 mL    OUT:    Colostomy: 450 mL    Ureteral Catheter: 1950 mL  Total OUT: 2400 mL    Total NET: -2080 mL          Lab Data                        9.7    12.96 )-----------( 271      ( 05 Jul 2020 07:12 )             31.6     07-05    140  |  108  |  10  ----------------------------<  76  4.1   |  28  |  0.57    Ca    8.5      05 Jul 2020 07:12  Phos  2.6     07-04  Mg     2.1     07-05              Review of Systems	      Objective     Physical Examination    heart s1s2  lung dec BS      Pertinent Lab findings & Imaging      Ana:  NO   Adequate UO     I&O's Detail    04 Jul 2020 07:01  -  05 Jul 2020 07:00  --------------------------------------------------------  IN:    Oral Fluid: 680 mL  Total IN: 680 mL    OUT:    Colostomy: 200 mL    Ureteral Catheter: 2350 mL  Total OUT: 2550 mL    Total NET: -1870 mL      05 Jul 2020 07:01  -  06 Jul 2020 06:50  --------------------------------------------------------  IN:    Oral Fluid: 320 mL  Total IN: 320 mL    OUT:    Colostomy: 450 mL    Ureteral Catheter: 1950 mL  Total OUT: 2400 mL    Total NET: -2080 mL               Discussed with:     Cultures:	        Radiology

## 2020-07-06 NOTE — PROGRESS NOTE ADULT - SUBJECTIVE AND OBJECTIVE BOX
Patient is a 80y old  Male who presents with a chief complaint of  GI bleeding    HPI: Hypotonic Bladder, HTN, HLD, Prostate Cancer presents with GI bleeding and fatigue/weakness.  Patient has been having ongoing issues with GI bleeding and was scheduled to have oupt endoscopy, however was delayed due to covid 19.  He presents with Fatigue.  He chronically straight caths himself due to urinary retention for 12 years and has followed with urology.  He states he has been straight cathing with normal volume, but has been having more frequency.  He denies dysuria/SOB/CP/Dizziness/N/V.  Saw Dr. Carrasco-nephrologist sometime ago.     Follow up Acute on CKD Stage 2  No new complaints, breathing well.     PAST MEDICAL & SURGICAL HISTORY:  Prostate CA  Hyperlipidemia  Hypertension  CAD (coronary artery disease)  Acute MI  No significant past surgical history       FAMILY HISTORY:  NC    Social History:Non smoker    MEDICATIONS  (STANDING):  pantoprazole  Injectable 40 milliGRAM(s) IV Push Once  pantoprazole  Injectable 40 milliGRAM(s) IV Push two times a day    MEDICATIONS  (PRN):   Meds reviewed    Allergies    clindamycin (Diarrhea)  penicillins (Hives)  sulfa drugs (Rash)    Intolerances         REVIEW OF SYSTEMS:    CONSTITUTIONAL:  neg  EYES: No eye pain, visual disturbances, or discharge  ENMT:  No difficulty hearing, tinnitus, vertigo; No sinus or throat pain  NECK: No pain or stiffness  BREASTS: No pain, masses, or nipple discharge  RESPIRATORY: No SOB. No wheeze. No GEORGES  CARDIOVASCULAR: No chest pain, palpitations, dizziness,   GASTROINTESTINAL: No abdominal or epigastric pain. No nausea, vomiting, or hematemesis; No diarrhea or constipation.   GENITOURINARY: No dysuria, frequency, hematuria, or incontinence  NEUROLOGICAL: No headaches, memory loss, loss of strength, numbness, or tremors  SKIN: no Diffuse erythema, no blisters  LYMPH NODES: No enlarged glands  ENDOCRINE: No heat or cold intolerance; No hair loss  MUSCULOSKELETAL: No joint pain or swelling   PSYCHIATRIC: No depression, anxiety, mood swings, or difficulty sleeping  HEME/LYMPH: No easy bruising, or bleeding gums  ALLERGY AND IMMUNOLOGIC: No hives or eczema      ICU Vital Signs Last 24 Hrs  T(C): 37.4 (06 Jul 2020 13:14), Max: 37.4 (06 Jul 2020 13:14)  T(F): 99.3 (06 Jul 2020 13:14), Max: 99.3 (06 Jul 2020 13:14)  HR: 81 (06 Jul 2020 13:14) (70 - 87)  BP: 105/60 (06 Jul 2020 13:14) (105/60 - 139/78)  BP(mean): --  ABP: --  ABP(mean): --  RR: 17 (06 Jul 2020 13:14) (17 - 17)  SpO2: 95% (06 Jul 2020 13:14) (93% - 96%)          PHYSICAL EXAM:    GENERAL: No acute distress  HEAD:  Atraumatic, Normocephalic  EYES: Conjunctiva and sclera clear  ENMT: No tonsillar erythema, exudates, or enlargement; Moist mucous membranes, Good dentition, No lesions  NECK: Supple, neck  veins full  NERVOUS SYSTEM:  Alert & Oriented X3, Good concentration; Motor Strength wnl upper and lower extremities  CHEST/LUNG: Clear to percussion bilaterally; No rales, rhonchi, wheezing, or rubs  HEART: Regular rate and rhythm; No murmurs, rubs, or gallops  ABDOMEN: Soft, Nontender, Nondistended; Bowel sounds present,  EXTREMITIES:  No Edema  SKIN: No rashes or lesions, pale      LABS:                    reviewed

## 2020-07-06 NOTE — DISCHARGE NOTE NURSING/CASE MANAGEMENT/SOCIAL WORK - PATIENT PORTAL LINK FT
You can access the FollowMyHealth Patient Portal offered by [facility] by registering at the following website: [email or web address]. By joining [facility]’s FollowMyHealth portal, you will also be able to view your health information using other applications (apps) compatible with our system.

## 2020-07-06 NOTE — DISCHARGE NOTE PROVIDER - HOSPITAL COURSE
79 YO M with pmhx of CAD, HTN, HLD, prostate cancer (seed radiation 2009), MI (nov 2000, angioplasty w 3 stents to the right coronary artery), rheumatoid and psoriatic arthritis, plaque psoriasis, b/l hip replacements (left 1998, right 2002), C diff in 2014, urinary retention with self catheretization hypothyroidism, mild asthma, melanoma in situ presents for GIB. Pt states he noticed right red blood in his stools for 6 months. Pt has been feeling weakness, weight loss of 19 pounds since June 2019. Pt sent in by outpatient hematologist Dr. Bergeron given low Hgb outpatient. (6.8 on 6/24) Pt denies SOB or GEORGES, however feels weak when pushing heavy objects. Pt was scheduled with his GI doctor Dr. Mullen for outpatient colonoscopy in March 2020. However, colonoscopy canceled due to COVID19. Pt self catheterizes 5-6 times a day. Pt admits to burning prior to catheterization when his bladder is full. Pt denies fever, chills, hematemesis, hemoptysis, hematuria, melena, SOB, GEORGES, CP, abdominal pain.         ED vitals significant for temp 98 F, HR 79, /55, RR 16, 93% on room air. Pt's labs significant for +FOBT , wbc 13, Hgb 7.5, hct 25.2, 94% neutrophils, BUN 30, Cr 1.5, AST 95. Pt type and screen performed. CXR showed no acute abnormalities. Pt put on remote tele, made NPO, s/p pantoprazole 40 IV, famotidine 20 IV. 1 unit of pRBCs pending. EKG showed rate 69, NSR, LVH, LAD.         blood loss anemia, near-obstructing colonic mass - c/f malignancy - s/p Vira colectomy procedure 7/2    -f/u path results - HemeOnc to determine palliative tx options     -trending hgb for stability and need for supportive transfusions - hgb 9s currently    -prn analgesia    -leukocytosis - likely reactive; no jaylin e/o acute infection - monitoring off abxs for now    -HTN, HL, CAD - continue metoprolol, hctz, lipitor    -/chronic urinary retention - continue finasteride, flomax; Perez in place currently    -hypothy - continue synthroid    -dvt prophy    -dispo - likely will need HITESH placement at dispo given new colostomy, Perez, and need for assistive resources    -PT mobilization    Hypokalemia- resolved         I spent 49 min and notified pcp         PHYSICAL EXAM        Constitutional: NAD, well-groomed, well-developed    HEENT: PERRLA, EOMI, Normal Hearing, MMM    Neck: No LAD, No JVD    Back: Normal spine flexure, No CVA tenderness    Respiratory: CTAB/L     Cardiovascular: S1 and S2, RRR, no M/G/R    Gastrointestinal: BS+, soft, NT/ND    Extremities: No peripheral edema    Vascular: 2+ peripheral pulses    Neurological: A/O x 3, no focal deficits    Skin: No rashes 79 YO M with pmhx of CAD, HTN, HLD, prostate cancer (seed radiation 2009), MI (nov 2000, angioplasty w 3 stents to the right coronary artery), rheumatoid and psoriatic arthritis, plaque psoriasis, b/l hip replacements (left 1998, right 2002), C diff in 2014, urinary retention with self catheretization hypothyroidism, mild asthma, melanoma in situ presents for GIB. Pt states he noticed right red blood in his stools for 6 months. Pt has been feeling weakness, weight loss of 19 pounds since June 2019. Pt sent in by outpatient hematologist Dr. Bergeron given low Hgb outpatient. (6.8 on 6/24) Pt denies SOB or GEORGES, however feels weak when pushing heavy objects. Pt was scheduled with his GI doctor Dr. Mullen for outpatient colonoscopy in March 2020. However, colonoscopy canceled due to COVID19. Pt self catheterizes 5-6 times a day. Pt admits to burning prior to catheterization when his bladder is full. Pt denies fever, chills, hematemesis, hemoptysis, hematuria, melena, SOB, GEORGES, CP, abdominal pain.         ED vitals significant for temp 98 F, HR 79, /55, RR 16, 93% on room air. Pt's labs significant for +FOBT , wbc 13, Hgb 7.5, hct 25.2, 94% neutrophils, BUN 30, Cr 1.5, AST 95. Pt type and screen performed. CXR showed no acute abnormalities. Pt put on remote tele, made NPO, s/p pantoprazole 40 IV, famotidine 20 IV. 1 unit of pRBCs pending. EKG showed rate 69, NSR, LVH, LAD.         blood loss anemia, near-obstructing colonic mass - c/f malignancy - s/p Vira colectomy procedure 7/2    -f/u path results - HemeOnc to determine palliative tx options     -trending hgb for stability and need for supportive transfusions - hgb 9s currently    -prn analgesia    -leukocytosis - likely reactive; no jaylin e/o acute infection - monitoring off abxs for now    -HTN, HL, CAD - continue metoprolol, hctz, lipitor    -/chronic urinary retention - continue finasteride, flomax; Perez in place currently    -hypothy - continue synthroid    -dvt prophy    -dispo - likely will need HITESH placement at dispo given new colostomy, Perez, and need for assistive resources    -PT mobilization    Hypokalemia- resolved     Urine analysis no pyuria, asymptomatic for now. d/w chronic Perez. f/u with Dr Durant          I spent 49 min and notified pcp         PHYSICAL EXAM        Constitutional: NAD, well-groomed, well-developed    HEENT: PERRLA, EOMI, Normal Hearing, MMM    Neck: No LAD, No JVD    Back: Normal spine flexure, No CVA tenderness    Respiratory: CTAB/L     Cardiovascular: S1 and S2, RRR, no M/G/R    Gastrointestinal: BS+, soft, NT/ND    Extremities: No peripheral edema    Vascular: 2+ peripheral pulses    Neurological: A/O x 3, no focal deficits    Skin: No rashes

## 2020-07-06 NOTE — DISCHARGE NOTE PROVIDER - NSDCMRMEDTOKEN_GEN_ALL_CORE_FT
acetaminophen 325 mg oral tablet: 2 tab(s) orally every 6 hours, As needed, Temp greater or equal to 38C (100.4F), Mild Pain (1 - 3)  B-Complex 50 oral tablet: 1 tab(s) orally once a day  Citracal + D 315 mg-250 intl units oral tablet: 2 tab(s) orally 2 times a day  fenofibrate 160 mg oral tablet: 1 tab(s) orally once a day  Shara-Sequels 65 mg-25 mg oral tablet, extended release: 1 tab(s) orally once a day  finasteride 5 mg oral tablet: 1 tab(s) orally once a day  Flomax 0.4 mg oral capsule: 1 cap(s) orally once a day  Flonase 50 mcg/inh nasal spray: 2 spray(s) nasal once a day  fluocinonide 0.05% topical cream:   folic acid 0.8 mg oral tablet: 1 tab(s) orally once a day  hydroCHLOROthiazide 12.5 mg oral capsule: 1 cap(s) orally once a day  ibuprofen 400 mg oral tablet: 1 tab(s) orally every 6 hours, As needed, Temp greater or equal to 38.5C (101.3F), Moderate Pain (4 - 6)  lactase 3000 units oral tablet: 1 tab(s) orally 3 times a day (with meals)  levothyroxine 25 mcg (0.025 mg) oral tablet: 1 tab(s) orally once a day  Macrodantin 50 mg oral capsule: 1 cap(s) orally once a day (at bedtime)  metoprolol succinate 100 mg oral tablet, extended release: 1 tab(s) orally once a day  predniSONE 1 mg oral tablet: 1 tab(s) orally 4 times a day  Protonix 40 mg oral delayed release tablet: 1 tab(s) orally once a day  simvastatin 40 mg oral tablet: 1 tab(s) orally once a day (at bedtime)  Singulair 10 mg oral tablet:   Vasotec 5 mg oral tablet: 1 tab(s) orally once a day  hold if sbp &lt; 110   Vitamin C 1000 mg oral tablet: 1 tab(s) orally once a day  Xyzal 5 mg oral tablet:

## 2020-07-06 NOTE — DISCHARGE NOTE PROVIDER - NSDCCPCAREPLAN_GEN_ALL_CORE_FT
PRINCIPAL DISCHARGE DIAGNOSIS  Diagnosis: GI bleed  Assessment and Plan of Treatment: 2/2 colon mass      SECONDARY DISCHARGE DIAGNOSES  Diagnosis: Anemia  Assessment and Plan of Treatment: as above

## 2020-07-06 NOTE — DISCHARGE NOTE PROVIDER - NSDCFUSCHEDAPPT_GEN_ALL_CORE_FT
JOSE CRUZ TIERNEY ; 07/28/2020 ; [facility]  JOSE CRUZ TIERNEY ; 07/28/2020 ; [facility] JOSE CRUZ TIERNEY ; 07/28/2020 ; [facility]  JOSE CRUZ TIERNEY ; 07/28/2020 ; [facility] [address] [facility] [place] [facility] JOSE CRUZ TIERNEY ; 07/28/2020 ; [facility]  JOSE CRUZ TIERNEY ; 07/28/2020 ; [facility] [address] [facility]

## 2020-07-06 NOTE — DISCHARGE NOTE PROVIDER - PROVIDER TOKENS
PROVIDER:[TOKEN:[[id number]]],PROVIDER:[TOKEN:[[id number]]] PROVIDER:[TOKEN:[[id number]]],PROVIDER:[TOKEN:[[id number]]]

## 2020-07-06 NOTE — PROGRESS NOTE ADULT - SUBJECTIVE AND OBJECTIVE BOX
Patient seen and examined;  Chart reviewed and events noted;   very upset that he is debilitated      MEDICATIONS  (STANDING):  enoxaparin Injectable 40 milliGRAM(s) SubCutaneous daily  ferrous    sulfate 325 milliGRAM(s) Oral daily  finasteride 5 milliGRAM(s) Oral daily  fluticasone propionate 50 MICROgram(s)/spray Nasal Spray 2 Spray(s) Both Nostrils <User Schedule>  hydrochlorothiazide 12.5 milliGRAM(s) Oral daily  lactase Tablet 1 Tablet(s) Oral three times a day with meals  levothyroxine 25 MICROGram(s) Oral daily  loratadine 10 milliGRAM(s) Oral daily  metoprolol succinate  milliGRAM(s) Oral daily  montelukast 10 milliGRAM(s) Oral at bedtime  pantoprazole    Tablet 40 milliGRAM(s) Oral before breakfast  potassium chloride   Powder 40 milliEquivalent(s) Oral two times a day  predniSONE   Tablet 1 milliGRAM(s) Oral four times a day  senna 2 Tablet(s) Oral at bedtime  simvastatin 40 milliGRAM(s) Oral at bedtime  tamsulosin 0.4 milliGRAM(s) Oral at bedtime    MEDICATIONS  (PRN):  acetaminophen   Tablet .. 650 milliGRAM(s) Oral every 6 hours PRN Temp greater or equal to 38C (100.4F), Mild Pain (1 - 3)  HYDROmorphone  Injectable 0.5 milliGRAM(s) IV Push every 3 hours PRN Severe Pain (7 - 10)  ibuprofen  Tablet. 400 milliGRAM(s) Oral every 6 hours PRN Temp greater or equal to 38.5C (101.3F), Moderate Pain (4 - 6)      Vital Signs Last 24 Hrs  T(C): 37 (06 Jul 2020 05:30), Max: 37 (06 Jul 2020 05:30)  T(F): 98.6 (06 Jul 2020 05:30), Max: 98.6 (06 Jul 2020 05:30)  HR: 80 (06 Jul 2020 05:30) (70 - 87)  BP: 131/64 (06 Jul 2020 05:30) (131/64 - 140/78)  RR: 17 (06 Jul 2020 05:30) (17 - 17)  SpO2: 93% (06 Jul 2020 05:30) (93% - 96%)    PHYSICAL EXAM  General: adult thin frail gentleman in NAD  HEENT: clear oropharynx, anicteric sclera, pink conjunctivae  Neck: supple  CV: normal S1S2 with no murmur rubs or gallops  Lungs: clear to auscultation, no wheezes, no rhales  Abdomen: soft, minimal distention, + colostomy with brown stool; + bowel sounds  Ext: no clubbing cyanosis or edema  Skin: no rashes and no petichiae  Neuro: alert and oriented X3 no focal deficits      LABS:                        9.7    12.96 )-----------( 271      ( 05 Jul 2020 07:12 )             31.6     07-05    140  |  108  |  10  ----------------------------<  76  4.1   |  28  |  0.57    Ca    8.5      05 Jul 2020 07:12  Mg     2.1     07-05

## 2020-07-06 NOTE — PROGRESS NOTE ADULT - ASSESSMENT
MARY on CKD 2  GI bleeding  HTN  Anemia  Sigmoid Mass  Hypokalemia    -BLCr 0.9  -MARY likely 2/2 hypoxic/prerenal injury driven by GI bleeding  -UA 30 protein  -Ur lytes reviewed   -Renal indices remain stable at baseline range; electrolytes are stable as well  -PRBCs per primary; Hb currently stable  -BP well controlled; tolerating HCTZ  -Surg eval noted;  S/P OR  -Mag ordered      Thank you

## 2020-07-06 NOTE — PROGRESS NOTE ADULT - ASSESSMENT
81 y/o man known to our office followed for iron deficiency anemia, admitted for sig precipitous drop of H/H to Hgb 7+  Hx of known lower GI bleed ?radiation proctitis    -colonoscopy on 6/29/20 as inpatient found large fungating near obstructing sigmoid colon mass. CT c/a/p liver met with large conglomerate lesions replacing most of right lobe of the liver with addition lesion left lobe and lytic bones.  -seen by surgery 7/2/20, for palliative colon resection with diverting colostomy  - awaiting pathology and molecular profiling to determine best course of treatment  - patient will need rehab prior and improvement in performance status prior to any cytotoxic systemic therapy  - case discussed with Dr. Canales (hospitalist)

## 2020-07-06 NOTE — PROGRESS NOTE ADULT - ASSESSMENT
80M, HTN, HL, hypothy, CAD/stents, RA/psoriatic arthritis/plaque psoriasis, hx prostate cancer/brachiotherapy 2009, chronic urinary retention/intermittent self-caths - mgmt for GIB/acute blood loss anemia, near-obstructing colonic mass - c/f malignancy - s/p Vira colectomy procedure 7/2  -s/p OR for Kang procedure 7/2 - postop mgmt per Surgery  -f/u path results - HemeOnc to determine palliative tx options   -trending hgb for stability and need for supportive transfusions - hgb 9s currently  -prn analgesia  -leukocytosis - likely reactive; no jaylin e/o acute infection - monitoring off abxs for now  -HTN, HL, CAD - continue metoprolol, hctz, lipitor  -/chronic urinary retention - continue finasteride, flomax; Paez in place currently  -hypothy - continue synthroid  -dvt prophy  -dispo - likely will need HITESH placement at dispo given new colostomy, Paez, and need for assistive resources  -PT mobilization  Hypokalemia- resolved   Dysuria - check U/a UC and PCT form paez cath.

## 2020-07-06 NOTE — DISCHARGE NOTE PROVIDER - CARE PROVIDER_API CALL
Vladimir Boothe  INTERNAL MEDICINE  [address] [place] [address]   [place] [address]  Phone: [phone]  Fax: [phone]  Follow Up Time:     Adriano Shepherd  SURGERY  [address] [place] [address]  Phone: [phone]  Fax: [phone]  Follow Up Time: Vladimir Boothe  INTERNAL MEDICINE  [address] [place] [address]   [place] [address]  Phone: [phone]  Fax: [phone]  Follow Up Time:     Adrian Ross  SURGERY  [address] [place] [address]  Phone: [phone]  Fax: [phone]  Follow Up Time:

## 2020-07-06 NOTE — PROGRESS NOTE ADULT - ASSESSMENT
81y/o M PMH of CAD, HTN, HLD, prostate cancer (seed radiation 2009), MI (nov 2000, angioplasty w 3 stents to the right coronary artery), rheumatoid and psoriatic arthritis, plaque psoriasis, b/l hip replacements (left 1998, right 2002), C diff in 2014, urinary retention with self catheretization hypothyroidism, mild asthma, melanoma in situ presents for GIB. Pt states he noticed right red blood in his stools for 6 months. Pt has been feeling weakness, weight loss of 19 pounds since June 2019. Pt sent in by outpatient hematologist Dr. Bergeron given low Hgb outpatient. (6.8 on 6/24) Pt denies SOB or GEORGES, however feels weak when pushing heavy objects. Pt was scheduled with his GI doctor Dr. Mullen for outpatient colonoscopy in March 2020. However, colonoscopy canceled due to COVID19. Pt self catheterizes 5-6 times a day.    Sigmoid mass   - Colonoscopy revealed large fungating near obstructing sigmoid colon mass.   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  S/P Laparoscopic mobilization of splenic flexure of colon and Laparoscopic Vira colectomy 02-Jul-2020   - Continue Perez   - Manage per surgery/Heme/Primary/GI    CAD S/P stent  - TTE 12/1/18 mild AS, LVD,45 %, no need to repeat  - Continue Toprol  mg PO daily  - Continue Statin     HTN  - controlled  - Continue BB at home dose  - Continue HCTZ, he is appearing euvolemic, however, lower leg swelling likely 2/2 cancer  - Monitor routine hemodynamics.     HLD  - Continue statin    Anemia  - S/P PRBCs  - Trend CBC  - Would transfuse to keep Hb>8 and watch volume status     Eliza Pruett, [facility]  Cardiology  [phone] 81y/o M PMH of CAD, HTN, HLD, prostate cancer (seed radiation 2009), MI (nov 2000, angioplasty w 3 stents to the right coronary artery), rheumatoid and psoriatic arthritis, plaque psoriasis, b/l hip replacements (left 1998, right 2002), C diff in 2014, urinary retention with self catheretization hypothyroidism, mild asthma, melanoma in situ presents for GIB. Pt states he noticed right red blood in his stools for 6 months. Pt has been feeling weakness, weight loss of 19 pounds since June 2019. Pt sent in by outpatient hematologist Dr. Bergeron given low Hgb outpatient. (6.8 on 6/24) Pt denies SOB or GEORGES, however feels weak when pushing heavy objects. Pt was scheduled with his GI doctor Dr. Mullen for outpatient colonoscopy in March 2020. However, colonoscopy canceled due to COVID19. Pt self catheterizes 5-6 times a day.    Sigmoid mass   - Colonoscopy revealed large fungating near obstructing sigmoid colon mass.   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  S/P Laparoscopic mobilization of splenic flexure of colon and Laparoscopic Vira colectomy 02-Jul-2020   - Continue Perez   - Manage per surgery/Heme/Primary/GI    CAD S/P stent  - TTE 12/1/18 mild AS, LVD,45 %, no need to repeat  - Continue Toprol  mg PO daily  - Continue Statin     HTN  - controlled  - Continue BB at home dose  - Continue HCTZ, he is appearing euvolemic to dry  - Monitor routine hemodynamics.     HLD  - Continue statin    Anemia  - S/P PRBCs  - Trend CBC  - Would transfuse to keep Hb>8 and watch volume status     Eliza Pruett, [facility]  Cardiology  [phone]

## 2020-07-07 NOTE — PROGRESS NOTE ADULT - PROBLEM SELECTOR PROBLEM 1
Intestinal mass
Urinary retention
GI bleed
Intestinal mass
MARY (acute kidney injury)
GI bleed
Intestinal mass

## 2020-07-07 NOTE — ADVANCED PRACTICE NURSE CONSULT - ASSESSMENT
Patient is a 81yo male s/p colostomy 2/2  fungating, near obstructing sigmoid colon mass. CT shows liver mets with large conglomerate lesions replacing most of right lobe of the liver with additional lesion at left lobe and lytic bones. Pertinent labs: procalcitonin 0.55, protein 4.7, albumin 1.8,   Patient was educated in the care of his ostomy, he states he can not participate in his care due to numb fingers. used appliance removed and new appliance placed at [facility] medical associated related skin injury (MARSI) noted 7 x 1.5 red irritated and at [facility] MARSI 1.5 x 1.5 noted  Colostomy education video provided to patient for viewing. Patient will be discharged to rehab.

## 2020-07-07 NOTE — ADVANCED PRACTICE NURSE CONSULT - RECOMMEDATIONS
1. MARSI: cleanse with NS, pat dry, apply cavilon, adaptic, 4x4, secure with tegaderm QOD   RN present during consult: RN educated in the care of this patients ostomy care and skin care  LM for MD regarding recommendations

## 2020-07-07 NOTE — PROGRESS NOTE ADULT - ASSESSMENT
79 y/o man known to our office followed for iron deficiency anemia, admitted for sig precipitous drop of H/H to Hgb 7+  Hx of known lower GI bleed ?radiation proctitis  Colonoscopy on 6/29/20 as inpatient found large fungating near obstructing sigmoid colon mass. CT c/a/p liver met with large conglomerate lesions replacing most of right lobe of the liver with addition lesion left lobe and lytic bones.    - post palliative colon resection with diverting colostomy  - awaiting pathology and molecular profiling to determine best course of treatment  - for Rehab tx  - will f/u in office

## 2020-07-07 NOTE — PROGRESS NOTE ADULT - SUBJECTIVE AND OBJECTIVE BOX
[facility] -- Noe Land, Ana Lilia, Jamel, Yuri Ruvalcaba Savella  Office # [phone]      Follow Up:    GIB, CAD  Subjective/Observations:   No events overnight resting comfortably in bed.  No complaints of chest pain, dyspnea, or palpitations reported. No signs of orthopnea or PND. C/O urinary burning and spasm overnight    REVIEW OF SYSTEMS: All other review of systems is negative unless indicated above    PAST MEDICAL & SURGICAL HISTORY:  Asthma  Melanoma in situ  Hypothyroid  H/O Clostridium difficile infection  Urinary retention  Plaque psoriasis  History of urinary self-catheterization  Arthritis  Prostate CA: seed therapy aug 2009  Hyperlipidemia  Hypertension  CAD (coronary artery disease)  Acute MI  S/P tonsillectomy  H/O hernia repair  S/P cardiac cath      MEDICATIONS  (STANDING):  enoxaparin Injectable 40 milliGRAM(s) SubCutaneous daily  ferrous    sulfate 325 milliGRAM(s) Oral daily  finasteride 5 milliGRAM(s) Oral daily  fluticasone propionate 50 MICROgram(s)/spray Nasal Spray 2 Spray(s) Both Nostrils <User Schedule>  hydrochlorothiazide 12.5 milliGRAM(s) Oral daily  lactase Tablet 1 Tablet(s) Oral three times a day with meals  levothyroxine 25 MICROGram(s) Oral daily  loratadine 10 milliGRAM(s) Oral daily  metoprolol succinate  milliGRAM(s) Oral daily  montelukast 10 milliGRAM(s) Oral at bedtime  pantoprazole    Tablet 40 milliGRAM(s) Oral before breakfast  phenazopyridine 100 milliGRAM(s) Oral <User Schedule>  predniSONE   Tablet 1 milliGRAM(s) Oral four times a day  senna 2 Tablet(s) Oral at bedtime  simvastatin 40 milliGRAM(s) Oral at bedtime  tamsulosin 0.4 milliGRAM(s) Oral at bedtime    MEDICATIONS  (PRN):  acetaminophen   Tablet .. 650 milliGRAM(s) Oral every 6 hours PRN Temp greater or equal to 38C (100.4F), Mild Pain (1 - 3)  HYDROmorphone  Injectable 0.5 milliGRAM(s) IV Push every 3 hours PRN Severe Pain (7 - 10)  ibuprofen  Tablet. 400 milliGRAM(s) Oral every 6 hours PRN Temp greater or equal to 38.5C (101.3F), Moderate Pain (4 - 6)      Allergies    cephalexin (Urticaria)  clindamycin (Diarrhea)  erythromycin (Other)  lactose (Stomach Upset)  Methotrexate Sodium (Unknown)  penicillins (Hives)  sporalin (oxypsoralin/PUVA) (Other)  sulfa drugs (Rash)    Intolerances        Vital Signs Last 24 Hrs  T(C): 37.4 (07 Jul 2020 04:25), Max: 37.4 (06 Jul 2020 13:14)  T(F): 99.3 (07 Jul 2020 04:25), Max: 99.3 (06 Jul 2020 13:14)  HR: 79 (07 Jul 2020 04:25) (79 - 84)  BP: 122/69 (07 Jul 2020 04:25) (105/60 - 131/68)  BP(mean): --  RR: 18 (07 Jul 2020 04:25) (17 - 18)  SpO2: 95% (07 Jul 2020 04:25) (92% - 97%)    I&O's Summary    06 Jul 2020 07:01  -  07 Jul 2020 07:00  --------------------------------------------------------  IN: 740 mL / OUT: 2200 mL / NET: -1460 mL          PHYSICAL EXAM:  TELE: Off tele   Constitutional: NAD, awake and alert, well-developed  HEENT: Moist Mucous Membranes, Anicteric  Pulmonary: Non-labored, breath sounds are clear bilaterally, No wheezing, crackles or rhonchi  Cardiovascular: Regular, S1 and S2 nl, No murmurs, rubs, gallops or clicks  Gastrointestinal: Bowel Sounds present, soft, nontender.   Lymph: No lymphadenopathy. No peripheral edema.  Skin: No visible rashes or ulcers.  Psych:  Mood & affect appropriate    LABS: All Labs Reviewed:                        9.4    12.39 )-----------( 319      ( 06 Jul 2020 09:45 )             29.5                         9.7    12.96 )-----------( 271      ( 05 Jul 2020 07:12 )             31.6     06 Jul 2020 09:45    139    |  107    |  18     ----------------------------<  99     4.2     |  24     |  0.74   05 Jul 2020 07:12    140    |  108    |  10     ----------------------------<  76     4.1     |  28     |  0.57   04 Jul 2020 16:42    x      |  x      |  x      ----------------------------<  x      4.1     |  x      |  x        Ca    8.4        06 Jul 2020 09:45  Ca    8.5        05 Jul 2020 07:12  Mg     2.1       05 Jul 2020 07:12

## 2020-07-07 NOTE — PROGRESS NOTE ADULT - ASSESSMENT
81y/o M PMH of CAD, HTN, HLD, prostate cancer (seed radiation 2009), MI (nov 2000, angioplasty w 3 stents to the right coronary artery), rheumatoid and psoriatic arthritis, plaque psoriasis, b/l hip replacements (left 1998, right 2002), C diff in 2014, urinary retention with self catheretization hypothyroidism, mild asthma, melanoma in situ presents for GIB. Pt states he noticed right red blood in his stools for 6 months. Pt has been feeling weakness, weight loss of 19 pounds since June 2019. Pt sent in by outpatient hematologist Dr. Bergeron given low Hgb outpatient. (6.8 on 6/24) Pt denies SOB or GEORGES, however feels weak when pushing heavy objects. Pt was scheduled with his GI doctor Dr. Mullen for outpatient colonoscopy in March 2020. However, colonoscopy canceled due to COVID19. Pt self catheterizes 5-6 times a day.    Sigmoid mass   - Colonoscopy revealed large fungating near obstructing sigmoid colon mass.   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  S/P Laparoscopic mobilization of splenic flexure of colon and Laparoscopic Vira colectomy 02-Jul-2020   - Continue Perez   - Manage per surgery/Heme/Primary/GI    CAD S/P stent  - TTE 12/1/18 mild AS, LVD,45 %, no need to repeat  - Continue Toprol  mg PO daily  - Continue Statin     HTN  - controlled  - Continue BB at home dose  - Continue HCTZ, he is appearing euvolemic to dry  - Monitor routine hemodynamics.     HLD  - Continue statin    Anemia  - S/P PRBCs  - Trend CBC  - Would transfuse to keep Hb>8 and watch volume status     -Monitor and replete lytes, keep K>4 and Mg >2  - Further cardiac workup will depend on clinical course.   - All other workup per primary team  Thank you for the consult  Will continue to follow  Rubin Justice DNP, ANP-c  Cardiology   Spectra #[phone]

## 2020-07-07 NOTE — PROGRESS NOTE ADULT - SUBJECTIVE AND OBJECTIVE BOX
Will sign off of this case at this time. Renal function is stable with normal electrolytes. BP is also well controlled. Please recall as needed.    Thank you

## 2020-07-07 NOTE — PROGRESS NOTE ADULT - SUBJECTIVE AND OBJECTIVE BOX
SURGERY  Spectralink [phone]    Pt seen and examined. Pt denies any overnight events. Pt reports pain is well controlled. Pt reports passing flatus. Pt denies any nausea/vomiting. Ostomy functioning. Pt reports ambulating with assistance/PT. Tolerating regular diet. Pt being followed by urology for urinary issues- on flomax and pyridium with relief of symptoms and denies any urinary complaints    T(C): 37.4 (20 @ 04:25), Max: 37.4 (20 @ 13:14)  HR: 79 (20 @ 04:25) (79 - 84)  BP: 122/69 (20 @ 04:25) (105/60 - 131/68)  RR: 18 (20 @ 04:25) (17 - 18)  SpO2: 95% (20 @ 04:25) (92% - 97%)  Wt(kg): --  ICU Vital Signs Last 24 Hrs  T(C): 37.4 (2020 04:25), Max: 37.4 (2020 13:14)  T(F): 99.3 (2020 04:25), Max: 99.3 (2020 13:14)  HR: 79 (2020 04:25) (79 - 84)  BP: 122/69 (2020 04:25) (105/60 - 131/68)  BP(mean): --  ABP: --  ABP(mean): --  RR: 18 (2020 04:25) (17 - 18)  SpO2: 95% (2020 04:25) (92% - 97%)    CAPILLARY BLOOD GLUCOSE    I&O's Detail    2020 07:01  -  2020 07:00  --------------------------------------------------------  IN:    Oral Fluid: 740 mL  Total IN: 740 mL    OUT:    Colostomy: 500 mL    Ureteral Catheter: 1700 mL  Total OUT: 2200 mL    Total NET: -1460 mL      2020 07:01  -  2020 11:52  --------------------------------------------------------  IN:  Total IN: 0 mL    OUT:    Ureteral Catheter: 700 mL  Total OUT: 700 mL    Total NET: -700 mL        Physical exam: Pt sitting comfortably in bed in NAD  Chest- CTA bilaterally   CV- S1 & S2, RRR  Abdomen- Soft, non-tender, non-distended. (+) BS  Incisions/trochar sites clean, dry and intact, ostomy in place- pink- functioning with stool and air.    LABS:                        9.4    12.39 )-----------( 319      ( 2020 09:45 )             29.5     07-06    139  |  107  |  18  ----------------------------<  99  4.2   |  24  |  0.74    Ca    8.4<L>      2020 09:45        Urinalysis Basic - ( 2020 14:06 )    Color: Yellow / Appearance: Clear / S.005 / pH: x  Gluc: x / Ketone: Negative  / Bili: Negative / Urobili: Negative   Blood: x / Protein: 15 / Nitrite: Positive   Leuk Esterase: Small / RBC: 3-5 /HPF / WBC 3-5   Sq Epi: x / Non Sq Epi: Few / Bacteria: Occasional    79 YO M with pmhx of CAD, HTN, HLD, prostate cancer (seed radiation ), MI (2000, angioplasty w 3 stents to the right coronary artery), rheumatoid and psoriatic arthritis, plaque psoriasis, b/l hip replacements (left , right ), C diff in , urinary retention with self catheretization hypothyroidism, mild asthma, melanoma in situ presents for GIB s/p Laparoscopic Kang's for Diverting colostomy POD#5     -Continue DVT Prophylaxis with SCD's and Lovenox  -Continue Incentive Spirometry  -Continue analgesia  -Encourage OOB/ambulation with assistance  -Monitor ostomy function  -Above discussed with Dr. Shepherd- syed from surgical standpoint for discharge

## 2020-07-07 NOTE — PROGRESS NOTE ADULT - PROBLEM SELECTOR PLAN 1
planned for HITESH dc today - on RA - VS noted -   s/p Laparoscopic mobilization of splenic flexure of colon - Laparoscopic Vira colectomy  seen and examined - VS noted - labs reviewed - Surgery follow up noted reviewed  JEWELL andujar noted -   I gerardo  dvt p  serial abd exam and stoma care  assist with ADL  out of bed  asthma - I gerardo - monitor Sat - on Flonase and Singulair -   caution with Opioids  curr on Reg Diet  reassurance and emotional support  planned for HITESH dc - see CM notes.

## 2020-07-07 NOTE — PROGRESS NOTE ADULT - SUBJECTIVE AND OBJECTIVE BOX
All interim records and events noted.    feeling well      MEDICATIONS  (STANDING):  enoxaparin Injectable 40 milliGRAM(s) SubCutaneous daily  ferrous    sulfate 325 milliGRAM(s) Oral daily  finasteride 5 milliGRAM(s) Oral daily  fluticasone propionate 50 MICROgram(s)/spray Nasal Spray 2 Spray(s) Both Nostrils <User Schedule>  hydrochlorothiazide 12.5 milliGRAM(s) Oral daily  lactase Tablet 1 Tablet(s) Oral three times a day with meals  levothyroxine 25 MICROGram(s) Oral daily  loratadine 10 milliGRAM(s) Oral daily  metoprolol succinate  milliGRAM(s) Oral daily  montelukast 10 milliGRAM(s) Oral at bedtime  pantoprazole    Tablet 40 milliGRAM(s) Oral before breakfast  phenazopyridine 100 milliGRAM(s) Oral <User Schedule>  predniSONE   Tablet 1 milliGRAM(s) Oral four times a day  senna 2 Tablet(s) Oral at bedtime  simvastatin 40 milliGRAM(s) Oral at bedtime  tamsulosin 0.4 milliGRAM(s) Oral at bedtime    MEDICATIONS  (PRN):  acetaminophen   Tablet .. 650 milliGRAM(s) Oral every 6 hours PRN Temp greater or equal to 38C (100.4F), Mild Pain (1 - 3)  HYDROmorphone  Injectable 0.5 milliGRAM(s) IV Push every 3 hours PRN Severe Pain (7 - 10)  ibuprofen  Tablet. 400 milliGRAM(s) Oral every 6 hours PRN Temp greater or equal to 38.5C (101.3F), Moderate Pain (4 - 6)      Vital Signs Last 24 Hrs  T(C): 37.4 (07 Jul 2020 04:25), Max: 37.4 (06 Jul 2020 13:14)  T(F): 99.3 (07 Jul 2020 04:25), Max: 99.3 (06 Jul 2020 13:14)  HR: 79 (07 Jul 2020 04:25) (79 - 84)  BP: 122/69 (07 Jul 2020 04:25) (105/60 - 131/68)  BP(mean): --  RR: 18 (07 Jul 2020 04:25) (17 - 18)  SpO2: 95% (07 Jul 2020 04:25) (92% - 97%)    PHYSICAL EXAM  General: well developed  well nourished, in no acute distress  Head: atraumatic, normocephalic  ENT: sclera anicteric, buccal mucosa moist  Neck: supple, trachea midline, no palpable masses  CV: S1 S2, regular rate and rhythm  Lungs: clear to auscultation, no wheezes/rhonchi  Abdomen: soft, nontender, bowel sounds present, no palpable masses  Extrem: no clubbing/cyanosis/edema  Skin: no significant increased ecchymosis/petechiae  Neuro: alert and oriented X3,  no focal deficits      LABS:             9.4    12.39 )-----------( 319      ( 07-06 @ 09:45 )             29.5                9.7    12.96 )-----------( 271      ( 07-05 @ 07:12 )             31.6       07-06    139  |  107  |  18  ----------------------------<  99  4.2   |  24  |  0.74    Ca    8.4<L>      06 Jul 2020 09:45          RADIOLOGY & ADDITIONAL STUDIES:    IMPRESSION/RECOMMENDATIONS:

## 2020-07-07 NOTE — PROGRESS NOTE ADULT - ASSESSMENT
80M, HTN, HL, hypothy, CAD/stents, RA/psoriatic arthritis/plaque psoriasis, hx prostate cancer/brachiotherapy 2009, chronic urinary retention/intermittent self-caths - mgmt for GIB/acute blood loss anemia, near-obstructing colonic mass - c/f malignancy - s/p Vira colectomy procedure 7/2  -s/p OR for Kang procedure 7/2 - postop mgmt per Surgery  -f/u path results - HemeOnc to determine palliative tx options   -trending hgb for stability and need for supportive transfusions - hgb 9s currently  -prn analgesia  -leukocytosis - likely reactive; no jaylin e/o acute infection - monitoring off abxs for now  -HTN, HL, CAD - continue metoprolol, hctz, lipitor  -/chronic urinary retention - continue finasteride, flomax; Paez in place currently  -hypothy - continue synthroid  -dvt prophy  -dispo - likely will need HITESH placement at dispo given new colostomy, Paez, and need for assistive resources  -PT mobilization  Hypokalemia- resolved   Dysuria -  U/a no pyuria  and PCT 0.5   paez cath. asymptomatic  today.

## 2020-07-07 NOTE — PROGRESS NOTE ADULT - SUBJECTIVE AND OBJECTIVE BOX
Date/Time Patient Seen:  		  Referring MD:   Data Reviewed	       Patient is a 80y old  Male who presents with a chief complaint of GI bleeding (06 Jul 2020 13:28)      Subjective/HPI     PAST MEDICAL & SURGICAL HISTORY:  Asthma  Melanoma in situ  Hypothyroid  H/O Clostridium difficile infection  Urinary retention  Plaque psoriasis  History of urinary self-catheterization  Arthritis  Prostate CA: seed therapy aug 2009  Hyperlipidemia  Hypertension  CAD (coronary artery disease)  Acute MI  S/P tonsillectomy  H/O hernia repair  S/P cardiac cath  No significant past surgical history        Medication list         MEDICATIONS  (STANDING):  enoxaparin Injectable 40 milliGRAM(s) SubCutaneous daily  ferrous    sulfate 325 milliGRAM(s) Oral daily  finasteride 5 milliGRAM(s) Oral daily  fluticasone propionate 50 MICROgram(s)/spray Nasal Spray 2 Spray(s) Both Nostrils <User Schedule>  hydrochlorothiazide 12.5 milliGRAM(s) Oral daily  lactase Tablet 1 Tablet(s) Oral three times a day with meals  levothyroxine 25 MICROGram(s) Oral daily  loratadine 10 milliGRAM(s) Oral daily  metoprolol succinate  milliGRAM(s) Oral daily  montelukast 10 milliGRAM(s) Oral at bedtime  pantoprazole    Tablet 40 milliGRAM(s) Oral before breakfast  phenazopyridine 100 milliGRAM(s) Oral <User Schedule>  predniSONE   Tablet 1 milliGRAM(s) Oral four times a day  senna 2 Tablet(s) Oral at bedtime  simvastatin 40 milliGRAM(s) Oral at bedtime  tamsulosin 0.4 milliGRAM(s) Oral at bedtime    MEDICATIONS  (PRN):  acetaminophen   Tablet .. 650 milliGRAM(s) Oral every 6 hours PRN Temp greater or equal to 38C (100.4F), Mild Pain (1 - 3)  HYDROmorphone  Injectable 0.5 milliGRAM(s) IV Push every 3 hours PRN Severe Pain (7 - 10)  ibuprofen  Tablet. 400 milliGRAM(s) Oral every 6 hours PRN Temp greater or equal to 38.5C (101.3F), Moderate Pain (4 - 6)         Vitals log        ICU Vital Signs Last 24 Hrs  T(C): 37.4 (07 Jul 2020 04:25), Max: 37.4 (06 Jul 2020 13:14)  T(F): 99.3 (07 Jul 2020 04:25), Max: 99.3 (06 Jul 2020 13:14)  HR: 79 (07 Jul 2020 04:25) (79 - 84)  BP: 122/69 (07 Jul 2020 04:25) (105/60 - 131/68)  BP(mean): --  ABP: --  ABP(mean): --  RR: 18 (07 Jul 2020 04:25) (17 - 18)  SpO2: 95% (07 Jul 2020 04:25) (92% - 97%)           Input and Output:  I&O's Detail    05 Jul 2020 07:01  -  06 Jul 2020 07:00  --------------------------------------------------------  IN:    Oral Fluid: 320 mL  Total IN: 320 mL    OUT:    Colostomy: 450 mL    Ureteral Catheter: 1950 mL  Total OUT: 2400 mL    Total NET: -2080 mL      06 Jul 2020 07:01  -  07 Jul 2020 06:20  --------------------------------------------------------  IN:    Oral Fluid: 740 mL  Total IN: 740 mL    OUT:    Colostomy: 300 mL    Ureteral Catheter: 1450 mL  Total OUT: 1750 mL    Total NET: -1010 mL          Lab Data                        9.4    12.39 )-----------( 319      ( 06 Jul 2020 09:45 )             29.5     07-06    139  |  107  |  18  ----------------------------<  99  4.2   |  24  |  0.74    Ca    8.4<L>      06 Jul 2020 09:45  Mg     2.1     07-05              Review of Systems	      Objective     Physical Examination    heart s1s2  lung dec BS      Pertinent Lab findings & Imaging      Ana:  NO   Adequate UO     I&O's Detail    05 Jul 2020 07:01  -  06 Jul 2020 07:00  --------------------------------------------------------  IN:    Oral Fluid: 320 mL  Total IN: 320 mL    OUT:    Colostomy: 450 mL    Ureteral Catheter: 1950 mL  Total OUT: 2400 mL    Total NET: -2080 mL      06 Jul 2020 07:01  -  07 Jul 2020 06:20  --------------------------------------------------------  IN:    Oral Fluid: 740 mL  Total IN: 740 mL    OUT:    Colostomy: 300 mL    Ureteral Catheter: 1450 mL  Total OUT: 1750 mL    Total NET: -1010 mL               Discussed with:     Cultures:	        Radiology

## 2020-07-07 NOTE — PROGRESS NOTE ADULT - SUBJECTIVE AND OBJECTIVE BOX
Patient is a 80y old  Male who presents with a chief complaint of GI bleeding (2020 13:28)      INTERVAL HPI/OVERNIGHT EVENTS: 80M, HTN, HL, hypothy, CAD/stents, RA/psoriatic arthritis/plaque psoriasis, hx prostate cancer/brachiotherapy , chronic urinary retention/intermittent self-caths - mgmt for GIB/acute blood loss anemia, near-obstructing colonic mass - c/f malignancy - s/p Vira colectomy procedure .     MEDICATIONS  (STANDING):  enoxaparin Injectable 40 milliGRAM(s) SubCutaneous daily  ferrous    sulfate 325 milliGRAM(s) Oral daily  finasteride 5 milliGRAM(s) Oral daily  fluticasone propionate 50 MICROgram(s)/spray Nasal Spray 2 Spray(s) Both Nostrils <User Schedule>  hydrochlorothiazide 12.5 milliGRAM(s) Oral daily  lactase Tablet 1 Tablet(s) Oral three times a day with meals  levothyroxine 25 MICROGram(s) Oral daily  loratadine 10 milliGRAM(s) Oral daily  metoprolol succinate  milliGRAM(s) Oral daily  montelukast 10 milliGRAM(s) Oral at bedtime  pantoprazole    Tablet 40 milliGRAM(s) Oral before breakfast  phenazopyridine 100 milliGRAM(s) Oral <User Schedule>  predniSONE   Tablet 1 milliGRAM(s) Oral four times a day  senna 2 Tablet(s) Oral at bedtime  simvastatin 40 milliGRAM(s) Oral at bedtime  tamsulosin 0.4 milliGRAM(s) Oral at bedtime    MEDICATIONS  (PRN):  acetaminophen   Tablet .. 650 milliGRAM(s) Oral every 6 hours PRN Temp greater or equal to 38C (100.4F), Mild Pain (1 - 3)  HYDROmorphone  Injectable 0.5 milliGRAM(s) IV Push every 3 hours PRN Severe Pain (7 - 10)  ibuprofen  Tablet. 400 milliGRAM(s) Oral every 6 hours PRN Temp greater or equal to 38.5C (101.3F), Moderate Pain (4 - 6)      Allergies    cephalexin (Urticaria)  clindamycin (Diarrhea)  erythromycin (Other)  lactose (Stomach Upset)  Methotrexate Sodium (Unknown)  penicillins (Hives)  sporalin (oxypsoralin/PUVA) (Other)  sulfa drugs (Rash)    Intolerances        REVIEW OF SYSTEMS:  CONSTITUTIONAL: No fever, weight loss, or fatigue  EYES: No eye pain, visual disturbances, or discharge  ENMT:  No difficulty hearing, tinnitus, vertigo; No sinus or throat pain  NECK: No pain or stiffness  BREASTS: No pain, masses, or nipple discharge  RESPIRATORY: No cough, wheezing, chills or hemoptysis; No shortness of breath  CARDIOVASCULAR: No chest pain, palpitations, dizziness, or leg swelling  GASTROINTESTINAL: No abdominal or epigastric pain. No nausea, vomiting, or hematemesis; No diarrhea or constipation. No melena or hematochezia.  GENITOURINARY: No dysuria, frequency, hematuria, or incontinence  NEUROLOGICAL: No headaches, memory loss, loss of strength, numbness, or tremors  SKIN: No itching, burning, rashes, or lesions   LYMPH NODES: No enlarged glands  ENDOCRINE: No heat or cold intolerance; No hair loss; No polydipsia or polyuria  MUSCULOSKELETAL: No joint pain or swelling; No muscle, back, or extremity pain  PSYCHIATRIC: No depression, anxiety, mood swings, or difficulty sleeping  HEME/LYMPH: No easy bruising, or bleeding gums  ALLERGY AND IMMUNOLOGIC: No hives or eczema    Vital Signs Last 24 Hrs  T(C): 37 (2020 13:05), Max: 37.4 (2020 13:14)  T(F): 98.6 (2020 13:05), Max: 99.3 (2020 13:14)  HR: 82 (2020 13:05) (79 - 84)  BP: 107/- (2020 13:05) (105/60 - 131/68)  BP(mean): --  RR: 18 (2020 13:05) (17 - 18)  SpO2: 94% (2020 13:05) (92% - 97%)    PHYSICAL EXAM:  GENERAL: NAD, well-groomed, well-developed  HEAD:  Atraumatic, Normocephalic  EYES: EOMI, PERRLA, conjunctiva and sclera clear  ENMT: No tonsillar erythema, exudates, or enlargement; Moist mucous membranes, Good dentition, No lesions  NECK: Supple, No JVD, Normal thyroid  NERVOUS SYSTEM:  Alert & Oriented X3, Good concentration; Motor Strength 5/5 B/L upper and lower extremities; DTRs 2+ intact and symmetric  CHEST/LUNG: Clear to auscultation bilaterally; No rales, rhonchi, wheezing, or rubs  HEART: Regular rate and rhythm; No murmurs, rubs, or gallops  ABDOMEN: Soft, Nontender, Nondistended; Bowel sounds present  EXTREMITIES:  2+ Peripheral Pulses, No clubbing, cyanosis, or edema  LYMPH: No lymphadenopathy noted  SKIN: No rashes or lesions    LABS:      Ca    8.4        2020 09:45        Urinalysis Basic - ( 2020 14:06 )    Color: Yellow / Appearance: Clear / S.005 / pH: x  Gluc: x / Ketone: Negative  / Bili: Negative / Urobili: Negative   Blood: x / Protein: 15 / Nitrite: Positive   Leuk Esterase: Small / RBC: 3-5 /HPF / WBC 3-5   Sq Epi: x / Non Sq Epi: Few / Bacteria: Occasional      CAPILLARY BLOOD GLUCOSE          RADIOLOGY & ADDITIONAL TESTS:    Imaging Personally Reviewed:  [ ] YES  [ ] NO    Consultant(s) Notes Reviewed:  [ ] YES  [ ] NO    Care Discussed with Consultants/Other Providers [ ] YES  [ ] NO

## 2020-07-07 NOTE — PROGRESS NOTE ADULT - NSHPATTENDINGPLANDISCUSS_GEN_ALL_CORE
DR. Ross,
RN, pt
patient in detail in person, wife in detail by phone, Hospitalist team, Heme Onc attending and Surgery PA.
patient in detail, Hematology-Oncology and Urology attendings, and Surgery PA.
pt and his wife and surgical PA about his dysuria
RN, pt
patient in detail in person, wife in detail by phone, Hospitalist team, Surgery PA and today's RN.
pt and RN about discharge plan.
pt and nurses and Dr Bergeron about his metastatic mass
RN, pt and Dr Grullon about his colon cancer
Dr Damico PGY1, Dr Tati elizalde, NIELS Kasper
Dr Nikki SERRANO, RN, cardio Dr Benitez
Dr Ross gen surg, Cardio np mray, RN Lauri
CRISTOBAL Horne, 2N IDR team
NIELS Carreon
RN, Dr Ross gen surg, dr ulises khan, dr bran onco

## 2020-07-28 ENCOUNTER — APPOINTMENT (OUTPATIENT)
Dept: GASTROENTEROLOGY | Facility: HOSPITAL | Age: 80
End: 2020-07-28

## 2020-11-06 NOTE — PHYSICAL THERAPY INITIAL EVALUATION ADULT - PATIENT/FAMILY/SIGNIFICANT OTHER GOALS STATEMENT, PT EVAL
Patient wishes to return home when appropriate Principal Discharge DX:	Screening for viral disease

## 2020-11-09 ENCOUNTER — APPOINTMENT (OUTPATIENT)
Dept: PULMONOLOGY | Facility: CLINIC | Age: 80
End: 2020-11-09

## 2021-03-07 NOTE — PROGRESS NOTE ADULT - ATTENDING COMMENTS
Assumed care of patient at 1915.  Bedside report received from previous shift RN.  Reviewed POC with pt; no questions at this time.  Bed locked and lowered, call light and belongings within reach.       Abdomen remains soft, nontender and non distended.    Stoma functioning well and patient tolerating regular diet.  No new complaints.  Surgical incisions well approximated and healing nicely.  Encourage incentive spirometry.  Stoma training and VNS to be arranged for discharge.  Discharge planning.

## 2021-12-01 NOTE — PHYSICAL EXAM
[General Appearance - Well Developed] : well developed [Normal Appearance] : normal appearance [Well Groomed] : well groomed [General Appearance - Well Nourished] : well nourished [No Deformities] : no deformities [General Appearance - In No Acute Distress] : no acute distress [Normal Conjunctiva] : the conjunctiva exhibited no abnormalities [Eyelids - No Xanthelasma] : the eyelids demonstrated no xanthelasmas [Normal Oropharynx] : normal oropharynx [II] : II [Neck Cervical Mass (___cm)] : no neck mass was observed [Jugular Venous Distention Increased] : there was no jugular-venous distention [Thyroid Diffuse Enlargement] : the thyroid was not enlarged [Thyroid Nodule] : there were no palpable thyroid nodules [Heart Rate And Rhythm] : heart rate and rhythm were normal [Heart Sounds] : normal S1 and S2 [Murmurs] : no murmurs present [Respiration, Rhythm And Depth] : normal respiratory rhythm and effort Statement Selected [Exaggerated Use Of Accessory Muscles For Inspiration] : no accessory muscle use [Auscultation Breath Sounds / Voice Sounds] : lungs were clear to auscultation bilaterally [Abdomen Soft] : soft [Abdomen Tenderness] : non-tender [Abdomen Mass (___ Cm)] : no abdominal mass palpated [Abnormal Walk] : normal gait [Nail Clubbing] : no clubbing of the fingernails [Gait - Sufficient For Exercise Testing] : the gait was sufficient for exercise testing [Cyanosis, Localized] : no localized cyanosis [Petechial Hemorrhages (___cm)] : no petechial hemorrhages [Skin Color & Pigmentation] : normal skin color and pigmentation [] : no rash [Skin Lesions] : no skin lesions [No Venous Stasis] : no venous stasis [No Xanthoma] : no  xanthoma was observed [No Skin Ulcers] : no skin ulcer [Deep Tendon Reflexes (DTR)] : deep tendon reflexes were 2+ and symmetric [Sensation] : the sensory exam was normal to light touch and pinprick [No Focal Deficits] : no focal deficits [Oriented To Time, Place, And Person] : oriented to person, place, and time [Impaired Insight] : insight and judgment were intact [Affect] : the affect was normal [FreeTextEntry1] : I:E ratio 1:3; clear

## 2022-07-14 NOTE — PROGRESS NOTE ADULT - PROVIDER SPECIALTY LIST ADULT
Hospitalist Fusiform Excision Additional Text (Leave Blank If You Do Not Want): The margin was drawn around the clinically apparent lesion.  A fusiform shape was then drawn on the skin incorporating the lesion and margins.  Incisions were then made along these lines to the appropriate tissue plane and the lesion was extirpated.

## 2022-10-28 NOTE — ED ADULT NURSE NOTE - NSIMPLEMENTINTERV_GEN_ALL_ED
Implemented All Universal Safety Interventions:  [place] to call system. Call bell, personal items and telephone within reach. Instruct patient to call for assistance. Room bathroom lighting operational. Non-slip footwear when patient is off stretcher. Physically safe environment: no spills, clutter or unnecessary equipment. Stretcher in lowest position, wheels locked, appropriate side rails in place. epigastric pain/abdominal pain

## 2023-05-16 NOTE — ED PROVIDER NOTE - RELIEVING FACTORS
Caller: Charline Snowdendee dee WILLAMS    Relationship: Self    Best call back number: [phone]    Requested Prescriptions:   Requested Prescriptions     Pending Prescriptions Disp Refills   • losartan (COZAAR) 100 MG tablet 90 tablet 1     Sig: Take 1 tablet by mouth Daily.   • amLODIPine (NORVASC) 10 MG tablet 90 tablet 1     Sig: Take 1 tablet by mouth Daily.   • omeprazole (priLOSEC) 40 MG capsule 90 capsule 0     Sig: Take 1 capsule by mouth Daily.        Pharmacy where request should be sent: [id number] - [phone]  - [phone] FX     Last office visit with prescribing clinician: 4/27/2023   Last telemedicine visit with prescribing clinician: 4/30/2023   Next office visit with prescribing clinician: 11/28/2023     Additional details provided by patient: HAS 5 LEFT    Does the patient have less than a 3 day supply:  [x] Yes  [] No    Would you like a call back once the refill request has been completed: [] Yes [x] No    If the office needs to give you a call back, can they leave a voicemail: [] Yes [x] No    Teodoro Miles Rep   05/16/23 14:58 EDT            none
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
## 2023-10-06 NOTE — ED ADULT NURSE NOTE - EENT WDL
TO Ricky Sierra to please advise on rx. Thanks! Eyes with no visual disturbances.  Ears clean and dry and no hearing difficulties. Nose with pink mucosa and no drainage.  Mouth mucous membranes moist and pink.  No tenderness or swelling to throat or neck.

## 2024-04-21 NOTE — REVIEW OF SYSTEMS
Attending and PA/NP shared services statement (NON-critical care):
[Negative] : Heme/Lymph

## 2025-03-24 NOTE — PROGRESS NOTE ADULT - PROBLEM SELECTOR PLAN 1
Planned for OR this am - pt is NPO - no objection from Pulmonary   Palliative Diverting Colostomy -   pt has hx of Pulm nodules - and reactive airway disease - Asthma - follows with Dr. Narayan - on Singulair -   on RA - vs noted  alert and oriented and verbal  discussed GOC and Code Status - pt is full code - he knows and understands his diagnosis, pt is a Root Canal Surgeon - retired -. no history of blood product transfusion